# Patient Record
Sex: MALE | Race: WHITE | Employment: OTHER | URBAN - METROPOLITAN AREA
[De-identification: names, ages, dates, MRNs, and addresses within clinical notes are randomized per-mention and may not be internally consistent; named-entity substitution may affect disease eponyms.]

---

## 2019-02-09 ENCOUNTER — HOSPITAL ENCOUNTER (OUTPATIENT)
Dept: LAB | Age: 69
Discharge: HOME | End: 2019-02-09

## 2019-02-09 LAB
ERYTHROCYTE [DISTWIDTH] IN BLOOD BY AUTOMATED COUNT: 23.9 % (ref 11.9–14.6)
HCT VFR BLD AUTO: 37.4 % (ref 41.1–50.3)
HGB BLD-MCNC: 11.4 G/DL (ref 13.6–17.2)
MCH RBC QN AUTO: 26.3 PG (ref 26.1–32.9)
MCHC RBC AUTO-ENTMCNC: 30.5 G/DL (ref 31.4–35)
MCV RBC AUTO: 86.4 FL (ref 79.6–97.8)
NRBC # BLD: 0 K/UL (ref 0–0.2)
PLATELET # BLD AUTO: 240 K/UL (ref 150–450)
PMV BLD AUTO: 10.1 FL (ref 9.4–12.3)
RBC # BLD AUTO: 4.33 M/UL (ref 4.23–5.6)
WBC # BLD AUTO: 7.3 K/UL (ref 4.3–11.1)

## 2019-02-09 PROCEDURE — 85027 COMPLETE CBC AUTOMATED: CPT

## 2019-08-12 ENCOUNTER — APPOINTMENT (OUTPATIENT)
Dept: ULTRASOUND IMAGING | Age: 69
DRG: 727 | End: 2019-08-12
Attending: HOSPITALIST
Payer: MEDICARE

## 2019-08-12 ENCOUNTER — HOSPITAL ENCOUNTER (INPATIENT)
Age: 69
LOS: 4 days | Discharge: HOME | DRG: 727 | End: 2019-08-16
Attending: INTERNAL MEDICINE | Admitting: HOSPITALIST
Payer: MEDICARE

## 2019-08-12 ENCOUNTER — APPOINTMENT (OUTPATIENT)
Dept: GENERAL RADIOLOGY | Age: 69
DRG: 727 | End: 2019-08-12
Attending: HOSPITALIST
Payer: MEDICARE

## 2019-08-12 PROBLEM — N49.2 CELLULITIS OF SCROTUM: Status: ACTIVE | Noted: 2019-08-12

## 2019-08-12 PROBLEM — I50.23 ACUTE ON CHRONIC SYSTOLIC CHF (CONGESTIVE HEART FAILURE) (HCC): Status: ACTIVE | Noted: 2019-08-12

## 2019-08-12 LAB
ALBUMIN SERPL-MCNC: 2.7 G/DL (ref 3.2–4.6)
ALBUMIN/GLOB SERPL: 0.7 {RATIO} (ref 1.2–3.5)
ALP SERPL-CCNC: 80 U/L (ref 50–136)
ALT SERPL-CCNC: 14 U/L (ref 12–65)
ANION GAP SERPL CALC-SCNC: 6 MMOL/L (ref 7–16)
APPEARANCE UR: CLEAR
AST SERPL-CCNC: 22 U/L (ref 15–37)
BASOPHILS # BLD: 0 K/UL (ref 0–0.2)
BASOPHILS NFR BLD: 1 % (ref 0–2)
BILIRUB SERPL-MCNC: 1.9 MG/DL (ref 0.2–1.1)
BILIRUB UR QL: NEGATIVE
BNP SERPL-MCNC: 2942 PG/ML
BUN SERPL-MCNC: 15 MG/DL (ref 8–23)
CALCIUM SERPL-MCNC: 8 MG/DL (ref 8.3–10.4)
CHLORIDE SERPL-SCNC: 106 MMOL/L (ref 98–107)
CO2 SERPL-SCNC: 26 MMOL/L (ref 21–32)
COLOR UR: NORMAL
CREAT SERPL-MCNC: 1.46 MG/DL (ref 0.8–1.5)
DIFFERENTIAL METHOD BLD: ABNORMAL
EOSINOPHIL # BLD: 0.2 K/UL (ref 0–0.8)
EOSINOPHIL NFR BLD: 3 % (ref 0.5–7.8)
ERYTHROCYTE [DISTWIDTH] IN BLOOD BY AUTOMATED COUNT: 22.6 % (ref 11.9–14.6)
GLOBULIN SER CALC-MCNC: 4.1 G/DL (ref 2.3–3.5)
GLUCOSE BLD STRIP.AUTO-MCNC: 121 MG/DL (ref 65–100)
GLUCOSE BLD STRIP.AUTO-MCNC: 123 MG/DL (ref 65–100)
GLUCOSE SERPL-MCNC: 102 MG/DL (ref 65–100)
GLUCOSE UR STRIP.AUTO-MCNC: NEGATIVE MG/DL
HCT VFR BLD AUTO: 32 % (ref 41.1–50.3)
HGB BLD-MCNC: 9.7 G/DL (ref 13.6–17.2)
HGB UR QL STRIP: NEGATIVE
IMM GRANULOCYTES # BLD AUTO: 0.1 K/UL (ref 0–0.5)
IMM GRANULOCYTES NFR BLD AUTO: 1 % (ref 0–5)
KETONES UR QL STRIP.AUTO: NEGATIVE MG/DL
LACTATE SERPL-SCNC: 2.2 MMOL/L (ref 0.4–2)
LACTATE SERPL-SCNC: 2.3 MMOL/L (ref 0.4–2)
LEUKOCYTE ESTERASE UR QL STRIP.AUTO: NEGATIVE
LYMPHOCYTES # BLD: 0.7 K/UL (ref 0.5–4.6)
LYMPHOCYTES NFR BLD: 8 % (ref 13–44)
MCH RBC QN AUTO: 23.7 PG (ref 26.1–32.9)
MCHC RBC AUTO-ENTMCNC: 30.3 G/DL (ref 31.4–35)
MCV RBC AUTO: 78 FL (ref 79.6–97.8)
MONOCYTES # BLD: 0.9 K/UL (ref 0.1–1.3)
MONOCYTES NFR BLD: 10 % (ref 4–12)
NEUTS SEG # BLD: 6.7 K/UL (ref 1.7–8.2)
NEUTS SEG NFR BLD: 78 % (ref 43–78)
NITRITE UR QL STRIP.AUTO: NEGATIVE
NRBC # BLD: 0 K/UL (ref 0–0.2)
PH UR STRIP: 6.5 [PH] (ref 5–9)
PLATELET # BLD AUTO: 257 K/UL (ref 150–450)
PMV BLD AUTO: 9.5 FL (ref 9.4–12.3)
POTASSIUM SERPL-SCNC: 3.8 MMOL/L (ref 3.5–5.1)
PROCALCITONIN SERPL-MCNC: 0.2 NG/ML
PROT SERPL-MCNC: 6.8 G/DL (ref 6.3–8.2)
PROT UR STRIP-MCNC: NEGATIVE MG/DL
RBC # BLD AUTO: 4.1 M/UL (ref 4.23–5.6)
SODIUM SERPL-SCNC: 138 MMOL/L (ref 136–145)
SP GR UR REFRACTOMETRY: 1.01 (ref 1–1.02)
UROBILINOGEN UR QL STRIP.AUTO: 1 EU/DL (ref 0.2–1)
WBC # BLD AUTO: 8.6 K/UL (ref 4.3–11.1)

## 2019-08-12 PROCEDURE — 85025 COMPLETE CBC W/AUTO DIFF WBC: CPT

## 2019-08-12 PROCEDURE — 81003 URINALYSIS AUTO W/O SCOPE: CPT

## 2019-08-12 PROCEDURE — 74011250636 HC RX REV CODE- 250/636: Performed by: HOSPITALIST

## 2019-08-12 PROCEDURE — 74011000258 HC RX REV CODE- 258: Performed by: HOSPITALIST

## 2019-08-12 PROCEDURE — 71045 X-RAY EXAM CHEST 1 VIEW: CPT

## 2019-08-12 PROCEDURE — 84145 PROCALCITONIN (PCT): CPT

## 2019-08-12 PROCEDURE — 82962 GLUCOSE BLOOD TEST: CPT

## 2019-08-12 PROCEDURE — 83605 ASSAY OF LACTIC ACID: CPT

## 2019-08-12 PROCEDURE — 36415 COLL VENOUS BLD VENIPUNCTURE: CPT

## 2019-08-12 PROCEDURE — 74011250637 HC RX REV CODE- 250/637: Performed by: HOSPITALIST

## 2019-08-12 PROCEDURE — 65270000029 HC RM PRIVATE

## 2019-08-12 PROCEDURE — 83880 ASSAY OF NATRIURETIC PEPTIDE: CPT

## 2019-08-12 PROCEDURE — 80053 COMPREHEN METABOLIC PANEL: CPT

## 2019-08-12 PROCEDURE — 76870 US EXAM SCROTUM: CPT

## 2019-08-12 RX ORDER — METRONIDAZOLE 500 MG/100ML
500 INJECTION, SOLUTION INTRAVENOUS EVERY 12 HOURS
Status: DISCONTINUED | OUTPATIENT
Start: 2019-08-12 | End: 2019-08-15

## 2019-08-12 RX ORDER — VANCOMYCIN/0.9 % SOD CHLORIDE 1.5G/250ML
1500 PLASTIC BAG, INJECTION (ML) INTRAVENOUS
Status: DISCONTINUED | OUTPATIENT
Start: 2019-08-13 | End: 2019-08-15

## 2019-08-12 RX ORDER — NYSTATIN 100000 [USP'U]/G
POWDER TOPICAL 4 TIMES DAILY
COMMUNITY

## 2019-08-12 RX ORDER — SPIRONOLACTONE 25 MG/1
12.5 TABLET ORAL DAILY
Status: ON HOLD | COMMUNITY
End: 2021-01-22

## 2019-08-12 RX ORDER — POTASSIUM CHLORIDE 20 MEQ/1
20 TABLET, EXTENDED RELEASE ORAL
Status: COMPLETED | OUTPATIENT
Start: 2019-08-12 | End: 2019-08-12

## 2019-08-12 RX ORDER — MORPHINE SULFATE 2 MG/ML
2 INJECTION, SOLUTION INTRAMUSCULAR; INTRAVENOUS
Status: DISCONTINUED | OUTPATIENT
Start: 2019-08-12 | End: 2019-08-16 | Stop reason: HOSPADM

## 2019-08-12 RX ORDER — DIPHENHYDRAMINE HCL 25 MG
25 CAPSULE ORAL
Status: DISCONTINUED | OUTPATIENT
Start: 2019-08-12 | End: 2019-08-16 | Stop reason: HOSPADM

## 2019-08-12 RX ORDER — LORATADINE 10 MG/1
10 TABLET ORAL DAILY
Status: DISCONTINUED | OUTPATIENT
Start: 2019-08-13 | End: 2019-08-16 | Stop reason: HOSPADM

## 2019-08-12 RX ORDER — METFORMIN HYDROCHLORIDE 850 MG/1
850 TABLET ORAL 2 TIMES DAILY WITH MEALS
Status: ON HOLD | COMMUNITY
End: 2021-01-22

## 2019-08-12 RX ORDER — HYDROCODONE BITARTRATE AND ACETAMINOPHEN 5; 325 MG/1; MG/1
1 TABLET ORAL
Status: DISCONTINUED | OUTPATIENT
Start: 2019-08-12 | End: 2019-08-16 | Stop reason: HOSPADM

## 2019-08-12 RX ORDER — PRAVASTATIN SODIUM 20 MG/1
40 TABLET ORAL
Status: DISCONTINUED | OUTPATIENT
Start: 2019-08-12 | End: 2019-08-16 | Stop reason: HOSPADM

## 2019-08-12 RX ORDER — BUMETANIDE 2 MG/1
4 TABLET ORAL DAILY
Status: ON HOLD | COMMUNITY
End: 2021-01-22

## 2019-08-12 RX ORDER — ACETAMINOPHEN 325 MG/1
650 TABLET ORAL
Status: DISCONTINUED | OUTPATIENT
Start: 2019-08-12 | End: 2019-08-16 | Stop reason: HOSPADM

## 2019-08-12 RX ORDER — AMIODARONE HYDROCHLORIDE 200 MG/1
200 TABLET ORAL DAILY
COMMUNITY

## 2019-08-12 RX ORDER — INSULIN LISPRO 100 [IU]/ML
INJECTION, SOLUTION INTRAVENOUS; SUBCUTANEOUS
Status: DISCONTINUED | OUTPATIENT
Start: 2019-08-12 | End: 2019-08-16 | Stop reason: HOSPADM

## 2019-08-12 RX ORDER — FUROSEMIDE 10 MG/ML
60 INJECTION INTRAMUSCULAR; INTRAVENOUS EVERY 12 HOURS
Status: DISCONTINUED | OUTPATIENT
Start: 2019-08-12 | End: 2019-08-14

## 2019-08-12 RX ORDER — NALOXONE HYDROCHLORIDE 0.4 MG/ML
0.4 INJECTION, SOLUTION INTRAMUSCULAR; INTRAVENOUS; SUBCUTANEOUS AS NEEDED
Status: DISCONTINUED | OUTPATIENT
Start: 2019-08-12 | End: 2019-08-16 | Stop reason: HOSPADM

## 2019-08-12 RX ORDER — EPLERENONE 25 MG/1
TABLET, FILM COATED ORAL DAILY
COMMUNITY
End: 2021-02-03

## 2019-08-12 RX ORDER — SODIUM CHLORIDE 0.9 % (FLUSH) 0.9 %
5-40 SYRINGE (ML) INJECTION EVERY 8 HOURS
Status: DISCONTINUED | OUTPATIENT
Start: 2019-08-12 | End: 2019-08-16 | Stop reason: HOSPADM

## 2019-08-12 RX ORDER — EPLERENONE 25 MG/1
25 TABLET, FILM COATED ORAL DAILY
Status: DISCONTINUED | OUTPATIENT
Start: 2019-08-13 | End: 2019-08-12

## 2019-08-12 RX ORDER — BISACODYL 5 MG
5 TABLET, DELAYED RELEASE (ENTERIC COATED) ORAL DAILY PRN
Status: DISCONTINUED | OUTPATIENT
Start: 2019-08-12 | End: 2019-08-16 | Stop reason: HOSPADM

## 2019-08-12 RX ORDER — PRAVASTATIN SODIUM 40 MG/1
40 TABLET ORAL
COMMUNITY

## 2019-08-12 RX ORDER — METOPROLOL SUCCINATE 50 MG/1
100 TABLET, EXTENDED RELEASE ORAL DAILY
Status: DISCONTINUED | OUTPATIENT
Start: 2019-08-13 | End: 2019-08-16 | Stop reason: HOSPADM

## 2019-08-12 RX ORDER — CETIRIZINE HCL 10 MG
10 TABLET ORAL DAILY
COMMUNITY

## 2019-08-12 RX ORDER — METOPROLOL SUCCINATE 100 MG/1
100 TABLET, EXTENDED RELEASE ORAL DAILY
Status: ON HOLD | COMMUNITY
End: 2021-02-03 | Stop reason: SDUPTHER

## 2019-08-12 RX ORDER — SPIRONOLACTONE 25 MG/1
12.5 TABLET ORAL DAILY
Status: DISCONTINUED | OUTPATIENT
Start: 2019-08-13 | End: 2019-08-16 | Stop reason: HOSPADM

## 2019-08-12 RX ORDER — SODIUM CHLORIDE 0.9 % (FLUSH) 0.9 %
5-40 SYRINGE (ML) INJECTION AS NEEDED
Status: DISCONTINUED | OUTPATIENT
Start: 2019-08-12 | End: 2019-08-16 | Stop reason: HOSPADM

## 2019-08-12 RX ORDER — AMIODARONE HYDROCHLORIDE 200 MG/1
200 TABLET ORAL DAILY
Status: DISCONTINUED | OUTPATIENT
Start: 2019-08-13 | End: 2019-08-16 | Stop reason: HOSPADM

## 2019-08-12 RX ORDER — ONDANSETRON 2 MG/ML
4 INJECTION INTRAMUSCULAR; INTRAVENOUS
Status: DISCONTINUED | OUTPATIENT
Start: 2019-08-12 | End: 2019-08-16 | Stop reason: HOSPADM

## 2019-08-12 RX ADMIN — Medication 20 ML: at 16:00

## 2019-08-12 RX ADMIN — CEFEPIME 2 G: 2 INJECTION, POWDER, FOR SOLUTION INTRAVENOUS at 16:51

## 2019-08-12 RX ADMIN — METRONIDAZOLE 500 MG: 500 INJECTION, SOLUTION INTRAVENOUS at 16:51

## 2019-08-12 RX ADMIN — FUROSEMIDE 60 MG: 10 INJECTION, SOLUTION INTRAMUSCULAR; INTRAVENOUS at 21:35

## 2019-08-12 RX ADMIN — VANCOMYCIN HYDROCHLORIDE 2500 MG: 10 INJECTION, POWDER, LYOPHILIZED, FOR SOLUTION INTRAVENOUS at 21:36

## 2019-08-12 RX ADMIN — PRAVASTATIN SODIUM 40 MG: 20 TABLET ORAL at 21:35

## 2019-08-12 RX ADMIN — APIXABAN 5 MG: 2.5 TABLET, FILM COATED ORAL at 21:35

## 2019-08-12 RX ADMIN — Medication 10 ML: at 21:37

## 2019-08-12 RX ADMIN — SACUBITRIL AND VALSARTAN 1 TABLET: 24; 26 TABLET, FILM COATED ORAL at 16:52

## 2019-08-12 RX ADMIN — POTASSIUM CHLORIDE 20 MEQ: 20 TABLET, EXTENDED RELEASE ORAL at 20:00

## 2019-08-12 NOTE — PROGRESS NOTES
Pharmacokinetic Consult to Pharmacist    Shay Mei is a 77 y.o. male being treated for SSTI with vancomycin, cefepime and flagyl. Lab Results   Component Value Date/Time    BUN 15 08/12/2019 04:08 PM    Creatinine 1.46 08/12/2019 04:08 PM    WBC 8.6 08/12/2019 04:08 PM    Procalcitonin 0.2 08/12/2019 04:08 PM    Lactic acid 2.3 (HH) 08/12/2019 04:08 PM      CrCl cannot be calculated (Unknown ideal weight.). Day 1 of vancomycin. Goal trough is 10-20. Dosing started with 2.5g x 1 and then 1.5g q18h. Will continue to follow patient. Thank you,  Tiffanie Candelaria Pharm. D.   Clinical Pharmacist  [phone]

## 2019-08-12 NOTE — H&P
Hospitalist Note     Admit Date:  2019 12:19 PM   Name:  Tahir Sumner   Age:  77 y.o.  :  1950   MRN:  [id number]   PCP:  Shawn Landa MD  Treatment Team: Attending Provider: Rashmi Mares; Primary Nurse: [facility]    HPI/Subjective:   Chief complaint, pain swelling redness scrotum    This is a 71y male with PMHx significant for systolic CHF with EF 20- 93%, (ECHO 2019 at [facility]) Persistent A fib, HTN, DM2, presented as a direct admit from PCP office, because of concerns for scrotal cellulitis. Pt reports pain in his scrotum, swelling, redness, gradually worsening since the last 1 week. He reports 7-8/10, sharp pain in his scrotum, aggravated by movemnt and relieved by pain meds. No dysuria, no urgency, no frequency of urination. No fever. No chills. Pt also reports weight gain of 15 lbs in the last month, and 1 week ago, was switched from torsemide to Bumex 4mg. He has lower abdominal swelling, redness as well as in the thighs and groin region. Pt denies nausea, vomiting, reports left lower abdominal pain. He denies chest pain, no palpitations,. No cough, but reports to shortness of breath, worse with activity. Today, pt saw his PCP, in the office. Because of extensive scrotal and perineal cellulitis and need for IV antibiotics and CHF exacerbation, pt was admitted directly to the hospital for further evaluation and management. 10 systems reviewed and negative except as noted in HPI.   Past Medical History:   Diagnosis Date    Arthritis     OA-general/neck    Chronic pain     back    Diabetes ([facility] [address]     type 2 dx age 62-fastings avg 80; A1C-7.3 in -today  no S/S hypo    Hypertension     controlled with medication    Obesity     Other ill-defined conditions(799.89)     3 collaspsed discs- L4, L5,S1    S/P Left total knee replacement using cement 2011    S/P total knee replacement using cement 2011    right knee    Unspecified adverse effect of anesthesia     reports unable to do spinal with right knee due to collapsed discs/ no problem with GA      Past Surgical History:   Procedure Laterality Date    HX ORTHOPAEDIC  July 20,2011    replaced right TKA/ L TKA in Dec 2011      Allergies   Allergen Reactions    Adhesive Other (comments)     Redness from tegaderm     Other Medication Other (comments)     Reports swelling and shakiness with insect bites    Pcn [Penicillins] Hives    Pollen Extracts Other (comments)     Runny nose, itchy eyes      Social History     Tobacco Use    Smoking status: Never Smoker    Smokeless tobacco: Never Used   Substance Use Topics    Alcohol use: No      Family History   Problem Relation Age of Onset    Diabetes Mother     Heart Disease Father     Diabetes Sister     Diabetes Maternal Aunt         There is no immunization history on file for this patient. PTA Medications:  Prior to Admission Medications   Prescriptions Last Dose Informant Patient Reported? Taking? SITagliptin (JANUVIA) 100 mg tablet   Yes Yes   Sig: Take 100 mg by mouth daily. amiodarone (CORDARONE) 200 mg tablet   Yes Yes   Sig: Take 200 mg by mouth daily. apixaban (ELIQUIS) 5 mg tablet   Yes Yes   Sig: Take 5 mg by mouth two (2) times a day. bumetanide (BUMEX) 2 mg tablet   Yes Yes   Sig: Take 4 mg by mouth daily. cetirizine (ZYRTEC) 10 mg tablet   Yes Yes   Sig: Take 10 mg by mouth daily. eplerenone (INSPRA) 25 mg tablet   Yes Yes   Sig: Take  by mouth daily. metFORMIN (GLUCOPHAGE) 850 mg tablet   Yes Yes   Sig: Take 850 mg by mouth two (2) times daily (with meals). metoprolol succinate (TOPROL-XL) 100 mg tablet   Yes Yes   Sig: Take 100 mg by mouth daily. nystatin (NYSTOP) powder   Yes Yes   Sig: Apply  to affected area four (4) times daily. omega-3 fatty acids-vitamin e (FISH OIL) 1,000 mg Cap   Yes Yes   Sig: Take 1 Cap by mouth daily (after breakfast).  Las dose 2/3/12    pravastatin (PRAVACHOL) 40 mg tablet   Yes Yes   Sig: Take 40 mg by mouth nightly. sacubitril-valsartan (ENTRESTO) 24 mg/26 mg tablet   Yes Yes   Sig: Take 1 Tab by mouth two (2) times a day. spironolactone (ALDACTONE) 25 mg tablet   Yes Yes   Sig: Take 12.5 mg by mouth daily. Facility-Administered Medications: None       Objective:     Patient Vitals for the past 24 hrs:   Temp Pulse Resp BP SpO2   08/12/19 1536 97.6 °F (36.4 °C) 79 20 112/56 98 %   08/12/19 1247 98 °F (36.7 °C) 80 18 127/88 99 %     Oxygen Therapy  O2 Sat (%): 98 % (08/12/19 1536)      Intake/Output Summary (Last 24 hours) at 8/12/2019 1548  Last data filed at 8/12/2019 1518  Gross per 24 hour   Intake 240 ml   Output    Net 240 ml       *Note that automatically entered I/Os may not be accurate; dependent on patient compliance with collection and accurate  by assistants. Physical Exam:  General:    Obese,  Alert, awake oriented x 3,     Eyes:   Normal sclerae. Extraocular movements intact. HENT:  Normocephalic, atraumatic. Moist mucous membranes  CV:   RRR. No m/r/g. Lungs:  CTAB. No wheezing, rhonchi, or rales. Abdomen: Soft, obese, tenderness, noticed in the left lower quadrant, no rebound, no rigidity, no organomegaly, lower abdominal redness, sub cutaneous edema, extending to perineum, scrotum, bilateral thighs,   Genitourinary: Edematous scrotum with tenderness, erythema, no ulceration,    Extremities: Warm and dry. No cyanosis, leg edema pitting 2+, erythema in bilateral upper thighs. Neurologic: CN II-XII grossly intact. Sensation intact. No focal neurological deficits,   Skin:    Erythema, involving lower abdomen with subcutaneous edema, and redness extending to perineum, b/l groins, upper thighs b/l, excoriations upper and lower extremities,    Psych:  Normal mood and affect. I reviewed the labs, imaging, EKGs, telemetry, and other studies done this admission.   Data Review:   No results found for this or any previous visit (from the past 24 hour(s)). All Micro Results     None          Current Facility-Administered Medications   Medication Dose Route Frequency    metroNIDAZOLE (FLAGYL) IVPB premix 500 mg  500 mg IntraVENous Q12H    furosemide (LASIX) injection 60 mg  60 mg IntraVENous Q12H    sodium chloride (NS) flush 5-40 mL  5-40 mL IntraVENous Q8H    sodium chloride (NS) flush 5-40 mL  5-40 mL IntraVENous PRN    acetaminophen (TYLENOL) tablet 650 mg  650 mg Oral Q4H PRN    HYDROcodone-acetaminophen (NORCO) 5-325 mg per tablet 1 Tab  1 Tab Oral Q4H PRN    morphine injection 2 mg  2 mg IntraVENous Q4H PRN    naloxone (NARCAN) injection 0.4 mg  0.4 mg IntraVENous PRN    diphenhydrAMINE (BENADRYL) capsule 25 mg  25 mg Oral Q4H PRN    ondansetron (ZOFRAN) injection 4 mg  4 mg IntraVENous Q4H PRN    bisacodyl (DULCOLAX) tablet 5 mg  5 mg Oral DAILY PRN    cefepime (MAXIPIME) 2 g in 0.9% sodium chloride (MBP/ADV) 100 mL  2 g IntraVENous Q12H    insulin lispro (HUMALOG) injection   SubCUTAneous AC&HS    [START ON 8/13/2019] amiodarone (CORDARONE) tablet 200 mg  200 mg Oral DAILY    apixaban (ELIQUIS) tablet 5 mg  5 mg Oral BID    [START ON 8/13/2019] loratadine (CLARITIN) tablet 10 mg  10 mg Oral DAILY    [START ON 8/13/2019] metoprolol succinate (TOPROL-XL) XL tablet 100 mg  100 mg Oral DAILY    pravastatin (PRAVACHOL) tablet 40 mg  40 mg Oral QHS    sacubitril-valsartan (ENTRESTO) 24-26 mg tablet 1 Tab  1 Tab Oral BID    [START ON 8/13/2019] spironolactone (ALDACTONE) tablet 12.5 mg  12.5 mg Oral DAILY       Other Studies:  No results found.     Assessment and Plan:     Hospital Problems as of 8/12/2019 Date Reviewed: 5/12/2015          Codes Class Noted - Resolved POA    Acute on chronic systolic CHF (congestive heart failure) (HCC) ICD-10-CM: I50.23  ICD-9-CM: 428.23, 428.0  8/12/2019 - Present Unknown        * (Principal) Cellulitis of scrotum ICD-10-CM: N49.2  ICD-9-CM: 608.4  8/12/2019 - Present Unknown Diabetes ([facility] [address] (Chronic) ICD-10-CM: E11.9  ICD-9-CM: 250.00  7/22/2011 - Present Yes        HTN (hypertension) ICD-10-CM: I10  ICD-9-CM: 401.9  7/22/2011 - Present Yes              Plan: This is a 77 y Male with:    Extensive Scrotal and perineal cellulitis: POA  Cefepime,  Vancomycin, Flagyl. Urology c/s. Appreciate recs. No signs of Peter's gangrene clinically. Will obtain U/S scrotum,  CT abdomen and pelvis will also be obtained,     Acute on chronic Systolic CHF exacerbation due to non ischemic cardiomyopathy:  Last ECHO 4/2019 with EF 20-25%  IV Lasix 60 mg bid. Fluid restrictions, daily weights,  CBC, CMP, BNP, CXR  Beta blocker Entresto, Spironolactone. HTN/ CAD non obstructive:  Home meds    DM2:  Accuchecks, ACHS, Sliding scale Insulin  Hold Metformin,    Persistent A fib:  Eliquis     Discharge planning:    DVT ppx: Already on Eliquis  Code status:  Full    DPOA: Pt's wife Azul Apple [phone]  Estimated LOS:  Greater than 2 midnights  Risk:  High given presentation with Scrotal cellulitis, systolic CHF exacerbation and other comorbidities.      Signed:  Tammi Vargas MD

## 2019-08-12 NOTE — PROGRESS NOTES
Pt resting in bed. RR even/unlabored. NAD noted. Call light within reach. Safety measures in place. Encouraged to call PRN assist. Assessment completed.

## 2019-08-12 NOTE — PROGRESS NOTES
Pt is a direct admit he is alert and oriented rr are even and unlabored he is on room air tolerates well. Lung sounds are diminished no distress noted. abd is soft bowel sounds are active. Pt has swollen penis  with redness and swelling noted to inner groin. Rash noted to upper arms chest abd and legs. Pt states it all burns and itches. No distress noted. wil lower extremities both swollen with +1 pitting and weeping noted. Safety measures in place will continue to monitor.

## 2019-08-13 ENCOUNTER — APPOINTMENT (OUTPATIENT)
Dept: ULTRASOUND IMAGING | Age: 69
DRG: 727 | End: 2019-08-13
Attending: UROLOGY
Payer: MEDICARE

## 2019-08-13 ENCOUNTER — APPOINTMENT (OUTPATIENT)
Dept: CT IMAGING | Age: 69
DRG: 727 | End: 2019-08-13
Attending: HOSPITALIST
Payer: MEDICARE

## 2019-08-13 PROBLEM — I48.0 PAROXYSMAL ATRIAL FIBRILLATION (HCC): Status: ACTIVE | Noted: 2019-08-13

## 2019-08-13 PROBLEM — I42.9 CARDIOMYOPATHY (HCC): Status: ACTIVE | Noted: 2019-08-13

## 2019-08-13 PROBLEM — R60.1 ANASARCA: Status: ACTIVE | Noted: 2019-08-13

## 2019-08-13 PROBLEM — I27.81 COR PULMONALE (CHRONIC) (HCC): Status: ACTIVE | Noted: 2019-08-13

## 2019-08-13 LAB
ANION GAP SERPL CALC-SCNC: 7 MMOL/L (ref 7–16)
BUN SERPL-MCNC: 17 MG/DL (ref 8–23)
CALCIUM SERPL-MCNC: 8 MG/DL (ref 8.3–10.4)
CHLORIDE SERPL-SCNC: 110 MMOL/L (ref 98–107)
CO2 SERPL-SCNC: 26 MMOL/L (ref 21–32)
CREAT SERPL-MCNC: 1.31 MG/DL (ref 0.8–1.5)
GLUCOSE BLD STRIP.AUTO-MCNC: 115 MG/DL (ref 65–100)
GLUCOSE BLD STRIP.AUTO-MCNC: 124 MG/DL (ref 65–100)
GLUCOSE BLD STRIP.AUTO-MCNC: 132 MG/DL (ref 65–100)
GLUCOSE BLD STRIP.AUTO-MCNC: 140 MG/DL (ref 65–100)
GLUCOSE SERPL-MCNC: 96 MG/DL (ref 65–100)
LACTATE SERPL-SCNC: 1.5 MMOL/L (ref 0.4–2)
MAGNESIUM SERPL-MCNC: 2 MG/DL (ref 1.8–2.4)
POTASSIUM SERPL-SCNC: 3.8 MMOL/L (ref 3.5–5.1)
SODIUM SERPL-SCNC: 143 MMOL/L (ref 136–145)

## 2019-08-13 PROCEDURE — 74011000258 HC RX REV CODE- 258: Performed by: HOSPITALIST

## 2019-08-13 PROCEDURE — 80048 BASIC METABOLIC PNL TOTAL CA: CPT

## 2019-08-13 PROCEDURE — 65270000029 HC RM PRIVATE

## 2019-08-13 PROCEDURE — 74011250636 HC RX REV CODE- 250/636: Performed by: INTERNAL MEDICINE

## 2019-08-13 PROCEDURE — 74011250636 HC RX REV CODE- 250/636: Performed by: HOSPITALIST

## 2019-08-13 PROCEDURE — 74176 CT ABD & PELVIS W/O CONTRAST: CPT

## 2019-08-13 PROCEDURE — 36415 COLL VENOUS BLD VENIPUNCTURE: CPT

## 2019-08-13 PROCEDURE — 97166 OT EVAL MOD COMPLEX 45 MIN: CPT

## 2019-08-13 PROCEDURE — 83605 ASSAY OF LACTIC ACID: CPT

## 2019-08-13 PROCEDURE — 83735 ASSAY OF MAGNESIUM: CPT

## 2019-08-13 PROCEDURE — 97161 PT EVAL LOW COMPLEX 20 MIN: CPT

## 2019-08-13 PROCEDURE — 97530 THERAPEUTIC ACTIVITIES: CPT

## 2019-08-13 PROCEDURE — 82962 GLUCOSE BLOOD TEST: CPT

## 2019-08-13 PROCEDURE — 74011250637 HC RX REV CODE- 250/637: Performed by: HOSPITALIST

## 2019-08-13 PROCEDURE — 97535 SELF CARE MNGMENT TRAINING: CPT

## 2019-08-13 RX ORDER — SODIUM CHLORIDE 0.9 % (FLUSH) 0.9 %
10 SYRINGE (ML) INJECTION
Status: ACTIVE | OUTPATIENT
Start: 2019-08-13 | End: 2019-08-13

## 2019-08-13 RX ADMIN — AMIODARONE HYDROCHLORIDE 200 MG: 200 TABLET ORAL at 09:18

## 2019-08-13 RX ADMIN — Medication 10 ML: at 05:53

## 2019-08-13 RX ADMIN — SACUBITRIL AND VALSARTAN 1 TABLET: 24; 26 TABLET, FILM COATED ORAL at 09:18

## 2019-08-13 RX ADMIN — APIXABAN 5 MG: 2.5 TABLET, FILM COATED ORAL at 09:19

## 2019-08-13 RX ADMIN — METRONIDAZOLE 500 MG: 500 INJECTION, SOLUTION INTRAVENOUS at 04:02

## 2019-08-13 RX ADMIN — METRONIDAZOLE 500 MG: 500 INJECTION, SOLUTION INTRAVENOUS at 16:50

## 2019-08-13 RX ADMIN — FUROSEMIDE 60 MG: 10 INJECTION, SOLUTION INTRAMUSCULAR; INTRAVENOUS at 09:18

## 2019-08-13 RX ADMIN — CEFEPIME 2 G: 2 INJECTION, POWDER, FOR SOLUTION INTRAVENOUS at 03:28

## 2019-08-13 RX ADMIN — Medication 20 ML: at 14:00

## 2019-08-13 RX ADMIN — LORATADINE 10 MG: 10 TABLET ORAL at 09:18

## 2019-08-13 RX ADMIN — APIXABAN 5 MG: 2.5 TABLET, FILM COATED ORAL at 16:52

## 2019-08-13 RX ADMIN — PRAVASTATIN SODIUM 40 MG: 20 TABLET ORAL at 21:30

## 2019-08-13 RX ADMIN — CEFEPIME 2 G: 2 INJECTION, POWDER, FOR SOLUTION INTRAVENOUS at 16:50

## 2019-08-13 RX ADMIN — METOPROLOL SUCCINATE 100 MG: 50 TABLET, EXTENDED RELEASE ORAL at 09:19

## 2019-08-13 RX ADMIN — VANCOMYCIN HYDROCHLORIDE 1500 MG: 10 INJECTION, POWDER, LYOPHILIZED, FOR SOLUTION INTRAVENOUS at 13:33

## 2019-08-13 RX ADMIN — SACUBITRIL AND VALSARTAN 1 TABLET: 24; 26 TABLET, FILM COATED ORAL at 16:51

## 2019-08-13 RX ADMIN — Medication 10 ML: at 21:30

## 2019-08-13 RX ADMIN — SPIRONOLACTONE 12.5 MG: 25 TABLET ORAL at 09:18

## 2019-08-13 NOTE — PROGRESS NOTES
Pt in room alert and oriented rr are even and unlabored lung sounds are diminished. On room air. abd is soft bowel sounds are active. Swelling to groin +2. Skin red inflamed and rash noted to abd, arms and legs. Denies pain at current time safety measures in place will continue to monitor.

## 2019-08-13 NOTE — PROGRESS NOTES
Problem: Mobility Impaired (Adult and Pediatric)  Goal: *Acute Goals and Plan of Care (Insert Text)  Description  Discharge Goals:  (1.)Mr. Bob will move from supine to sit and sit to supine  with INDEPENDENT within 7 treatment day(s). (2.)Mr. Bob will transfer from bed to chair and chair to bed with MODIFIED INDEPENDENCE using the least restrictive device within 7 treatment day(s). (3.)Mr. Bob will ambulate with SUPERVISION for 500+ feet with the least restrictive device within 7 treatment day(s). ________________________________________________________________________________________________     Outcome: Progressing Towards Goal       PHYSICAL THERAPY: Initial Assessment and AM 8/13/2019  INPATIENT: PT Visit Days : 1  [place] / [place] / Product Type: Medicare /       NAME/AGE/GENDER: Khris Jerez is a 77 y.o. male   PRIMARY DIAGNOSIS: Acute on chronic systolic CHF (congestive heart failure) ([facility]) [I50.23] Cellulitis of scrotum   Cellulitis of scrotum          ICD-10: Treatment Diagnosis:    Generalized Muscle Weakness (M62.81)  Difficulty in walking, Not elsewhere classified (R26.2)  History of falling (Z91.81)   Precaution/Allergies:  Adhesive; Other medication; Pcn [penicillins]; and Pollen extracts      ASSESSMENT:     Mr. Naresh Weston is standing at sink finishing working with OT and agreeable to PT evaluation and treatment this morning. Pt is A&O X 4 with reports of 4/10 pain prior to mobility. Pt lives with his wife in 1 story home with 0 steps to enter. Pt is ambulatory with use of SPC but states over past week he has required use of RW due to increasing unsteadiness. Pt is independent with ADLs and reports history of 1 fall. Pt demonstrates good static standing balance at sink. Pt ambulated in room with RW and SBA. Pt furthered gait distance ambulating 100 ft in hallway with RW and CGA-SBA.  Pt ambulates with widened PHILL, shuffling gait pattern and increased trunk sway. Pt returned to room and sitting up in bedside chair with cues provided for safety of transfer. Pt left sitting up with all needs met and within reach. Stephan Goss will benefit from skilled PT (medically necessary) to address decreased strength, decreased balance, decreased functional tolerance, decreased cardiopulmonary endurance affecting participation in basic ADLs and functional tasks. This section established at most recent assessment   PROBLEM LIST (Impairments causing functional limitations):  Decreased Strength  Decreased ADL/Functional Activities  Decreased Transfer Abilities  Decreased Ambulation Ability/Technique  Decreased Balance  Increased Pain  Decreased Activity Tolerance  Decreased Pacing Skills  Increased Fatigue  Decreased [place] with Home Exercise Program   INTERVENTIONS PLANNED: (Benefits and precautions of physical therapy have been discussed with the patient.)  Balance Exercise  Bed Mobility  Family Education  Gait Training  Home Exercise Program (HEP)  Neuromuscular Re-education/Strengthening  Therapeutic Activites  Therapeutic Exercise/Strengthening  Transfer Training     TREATMENT PLAN: Frequency/Duration: 3 times a week for duration of hospital stay  Rehabilitation Potential For Stated Goals: Good     REHAB RECOMMENDATIONS (at time of discharge pending progress):    Placement: It is my opinion, based on this patient's performance to date, that Mr. Elisabet Russo may benefit from [address] after discharge due to the functional deficits listed above that are likely to improve with skilled rehabilitation because he/she has multiple medical issues that affect his/her functional mobility in the community.   Equipment:   None at this time              HISTORY:   History of Present Injury/Illness (Reason for Referral):  See H&P below  This is a 71y male with PMHx significant for systolic CHF with EF 20- 90%, (ECHO 04/2019 at [facility]) Persistent A fib, HTN, DM2, presented as a direct admit from PCP office, because of concerns for scrotal cellulitis. Pt reports pain in his scrotum, swelling, redness, gradually worsening since the last 1 week. He reports 7-8/10, sharp pain in his scrotum, aggravated by movemnt and relieved by pain meds. No dysuria, no urgency, no frequency of urination. No fever. No chills. Pt also reports weight gain of 15 lbs in the last month, and 1 week ago, was switched from torsemide to Bumex 4mg. He has lower abdominal swelling, redness as well as in the thighs and groin region. Pt denies nausea, vomiting, reports left lower abdominal pain. He denies chest pain, no palpitations,. No cough, but reports to shortness of breath, worse with activity. Today, pt saw his PCP, in the office. Because of extensive scrotal and perineal cellulitis and need for IV antibiotics and CHF exacerbation, pt was admitted directly to the hospital for further evaluation and management. Past Medical History/Comorbidities:   Mr. Juan Antonio Dejesus  has a past medical history of Arthritis, Chronic pain, Diabetes ([facility] [address] Hypertension, Obesity, Other ill-defined conditions(799.89), S/P Left total knee replacement using cement (12/5/2011), S/P total knee replacement using cement (7/20/2011), and Unspecified adverse effect of anesthesia. Mr. Juan Antonio Dejesus  has a past surgical history that includes hx orthopaedic (July 20,2011).   Social History/Living Environment:   Home Environment: Private residence  # Steps to Enter: 0  One/Two Story Residence: One story  Living Alone: No  Support Systems: Spouse/Significant Other/Partner  Patient Expects to be Discharged to[de-identified] Private residence  Current DME Used/Available at Home: Lugene Kays, straight, Walker, rolling, Shower chair, Grab bars, CPAP  Tub or Shower Type: Shower  Prior Level of Function/Work/Activity:  Amb with use of SPC, indep with ADLs, 1 fall   Number of Personal Factors/Comorbidities that affect the Plan of Care: 1-2: MODERATE COMPLEXITY EXAMINATION:   Most Recent Physical Functioning:   Gross Assessment:  AROM: Generally decreased, functional  Strength: Generally decreased, functional               Posture:     Balance:  Standing - Static: Constant support;Good  Standing - Dynamic : Fair;Constant support Bed Mobility:     Wheelchair Mobility:     Transfers:  Sit to Stand: Stand-by assistance;Contact guard assistance  Stand to Sit: Stand-by assistance  Gait:     Base of Support: Widened  Speed/Rhonda: Slow;Shuffled  Step Length: Left shortened;Right shortened  Gait Abnormalities: Decreased step clearance;Trunk sway increased; Shuffling gait  Distance (ft): 100 Feet (ft)  Assistive Device: Walker, rolling  Ambulation - Level of Assistance: Contact guard assistance;Stand-by assistance  Interventions: Safety awareness training; Tactile cues; Verbal cues      Body Structures Involved:  Nerves  Heart  Lungs  Bones  Muscles Body Functions Affected:  Sensory/Pain  Cardio  Respiratory  Neuromusculoskeletal  Movement Related Activities and Participation Affected:  General Tasks and Demands  Mobility  Self Care  Domestic Life  Interpersonal Interactions and Relationships  Community, Social and Civic Life   Number of elements that affect the Plan of Care: 4+: HIGH COMPLEXITY   CLINICAL PRESENTATION:   Presentation: Evolving clinical presentation with changing clinical characteristics: MODERATE COMPLEXITY   CLINICAL DECISION MAKIN [facility] Inpatient Short Form  How much difficulty does the patient currently have. .. Unable A Lot A Little None   1. Turning over in bed (including adjusting bedclothes, sheets and blankets)? ? 1   ? 2   ? 3   ? 4   2. Sitting down on and standing up from a chair with arms ( e.g., wheelchair, bedside commode, etc.)   ? 1   ? 2   ? 3   ? 4   3.   Moving from lying on back to sitting on the side of the bed?   ? 1   ? 2   ? 3   ? 4   How much help from another person does the patient currently need. .. Total A Lot A Little None   4. Moving to and from a bed to a chair (including a wheelchair)? ? 1   ? 2   ? 3   ? 4   5. Need to walk in hospital room? ? 1   ? 2   ? 3   ? 4   6. Climbing 3-5 steps with a railing? ? 1   ? 2   ? 3   ? 4   © 2007, Trustees of [address] [place], [place] [id number], under license to [employer]. All rights reserved      Score:  Initial: 18 Most Recent: X (Date: -- )    Interpretation of Tool:  Represents activities that are increasingly more difficult (i.e. Bed mobility, Transfers, Gait). Medical Necessity:     Patient is expected to demonstrate progress in strength, balance, coordination and functional technique   to decrease assistance required with gait, transfers and functional mobility. .  Reason for Services/Other Comments:  Patient continues to require skilled intervention due to decreased strength, decreased balance, decreased functional tolerance, decreased cardiopulmonary endurance affecting participation in basic ADLs and functional tasks. .   Use of outcome tool(s) and clinical judgement create a POC that gives a: Clear prediction of patient's progress: LOW COMPLEXITY            TREATMENT:   (In addition to Assessment/Re-Assessment sessions the following treatments were rendered)   Pre-treatment Symptoms/Complaints:  scrotal swelling  Pain: Initial:     4/10 Post Session:  Increased with mobility 5-6/10     Therapeutic Activity: (    8 minutes): Therapeutic activities including Chair transfers, Ambulation on level ground and cues for safety of transfers  to improve mobility, strength, balance and coordination. Required minimal Safety awareness training; Tactile cues; Verbal cues to promote static and dynamic balance in standing and promote coordination of bilateral, upper extremity(s), lower extremity(s).        Braces/Orthotics/Lines/Etc:   O2 Device: Room air  Treatment/Session Assessment:    Response to Treatment:  Amb 100 ft with RW and CGA-SBA  Interdisciplinary Collaboration:   Physical Therapist  Occupational Therapist  Registered Nurse  Certified Nursing Assistant/Patient Care Technician  After treatment position/precautions:   Up in chair  Bed/Chair-wheels locked  Bed in low position  Call light within reach   Compliance with Program/Exercises: Will assess as treatment progresses  Recommendations/Intent for next treatment session: \"Next visit will focus on advancements to more challenging activities and reduction in assistance provided\".   Total Treatment Duration:  PT Patient Time In/Time Out  Time In: 1011  Time Out: [address]

## 2019-08-13 NOTE — PROGRESS NOTES
Received a call from Xavier Sky in the lab notifying of a critical lactic acid result of 2.2. Results read back for verification. Notified primary RN, Tracy Olsen at [phone].

## 2019-08-13 NOTE — ROUTINE PROCESS
Spoke with patient in regardst o POC at this point and disease state. Patient is with [facility] cardiology and I will assist in follow up appointment closer to d/c. Patient reports 40lb weight gain since June and 15lbs in a month, he has Hx NICM EF 20-25% and echo completed in April. Patient currently on BID iv lasix with >4L negative overnight. Patient continues on entresto, BB, Aldactone. BNP 2942, Cr 1.46. AM labs pending. Patient with cellulitis to scrotum and on IV anbx, +2 edema to BLE. Patient endorsed limited shortness of breath prior to admission but endorsed the swelling to scrotum and pain was what made him seek treatment. Patient admit from PCP Continue to follow. CHF teaching started post introduction to pt/family; aware of diagnosis. Planner/scale @ BS and will follow. Smoking/ ETOH/Illicit drug use cessation and maintain a healthy weight covered. Pt/family aware that I can not prescribe nor adjust  medications: 15mins Palliative Care score: 16 Refused ACP on admission; on file Start 2L/D Fluid restriction/ cardiac diet CHF teaching continues to pt/family. Emphasis on taking prescription meds as ordered, to keep F/U appts and to call MD STAT if any of the following occur: ? If you gain 2 lbs in one day or 5 lbs in a week, and short of breath. ? If you can not lay flat without developing short of breath or rapid breathing at night; or if it wakes you up. Develop a cough or wheezing. ? If you notice swollen hands/feet/ankles or stomach with a bloated/ full feeling. ? If you become confused or mentally fuzzy or dizzy. ? If you notice a rapid or change in your heart rate. ? If you become more exhausted all the time and unable to do the same level of activity without stopping to catch your breath. Drink no more than 8 cups a day in 8 oz. cups. Your Heart can not handle any more. Stay away from salt (limit anything with salt or sodium in it). Limit to 250mg per serving. Pt/family verbalizes understanding, will follow to reinforce teaching skills: 35 mins

## 2019-08-13 NOTE — CONSULTS
[facility] Cardiology Consult                Date of  Admission: 8/12/2019 12:19 PM     Primary Care Physician: Dr. Akosua Saavedra  Primary Cardiologist:CC/[facility]  Referring Physician: Hospitalist   Consulting Physician: Dr. Flakita Win     CC/Reason for consult:CHF      Mukesh Nair is a 77 y.o. male with prior h/o HFrEF 2/2 NICM (etiology unknown per [facility] records with [facility] 2018 nonobstructive CAD), CHB s/p SJM BIV ICD, persistent A. Fib, NSVT, HTN, ZEINA home CPAP, and HLP. Recent 5/19- RHC showed RA 17, RV ?, PA 45/18(30), PAsat 62.1%, PW 13 (V-wave 20), CO/CI  5.2/2. 5(T) and 5.7/2.8(F), PVR 3.3 Conclusions: Moderately elevated right-sided and mildly elevated  left-sided filling pressures, mild pulmonary hypertension, and normal CO/CI by Bhupendra and Thermal. Successful CardioMEMS implantation in the left pulmonary artery. Goal PAD will be 17, for now. Last echo 4/19 showed EF 20-25%, RV normal, mod MR, mod Pul HTN. Patient was a direct admit from PCP's office with concerns for scrotal cellulitis. Pt reports pain in his scrotum, swelling, redness, gradually worsening since the last 1 week. He reports 7-8/10, sharp pain in his scrotum, aggravated by movemnt and relieved by pain meds. No dysuria, no urgency, no frequency of urination. No fever. No chills. Pt also reports weight gain of 15 lbs in the last month, and 1 week ago, was switched from torsemide to Bumex 4mg. He has lower abdominal swelling, redness as well as in the thighs and groin region. Hospitalist admitted for cellulitis and placed on IV abx. Urology saw patient and felt likely some of edema related to CHF. Labs showed BNP 2942, LA 2.3, Cr 1.46 (last Cr at [facility] 7/2/19 was 1.25) . Cardiology was consulted for CHF. He diuresed 4.3 liters overnight. No labs this am. He reports 40 pound weight gain over last several months, along with SOB and edema. No chest pain.        Diagnosis    Osteoarthritis of right knee    S/P total knee replacement using cement    UTI (urinary tract infection)    UTI (urinary tract infection)    Diabetes ([facility]    HTN (hypertension)    Osteoarthritis of left knee    S/P Left total knee replacement using cement    HTN (hypertension)    Type II or unspecified type diabetes mellitus without mention of complication, not stated as uncontrolled    Cutaneous skin tags    Seborrheic keratosis, inflamed    Acute on chronic systolic CHF (congestive heart failure) (HCC)    Cellulitis of scrotum       Past Medical History:   Diagnosis Date    Arthritis     OA-general/neck    Chronic pain     back    Diabetes ([facility]     type 2 dx age 62-fastings avg 80; A1C-7.3 in July '11-today  no S/S hypo    Hypertension     controlled with medication    Obesity     Other ill-defined conditions(799.89)     3 collaspsed discs- L4, L5,S1    S/P Left total knee replacement using cement 12/5/2011    S/P total knee replacement using cement 7/20/2011    right knee    Unspecified adverse effect of anesthesia     reports unable to do spinal with right knee due to collapsed discs/ no problem with GA      Past Surgical History:   Procedure Laterality Date    HX ORTHOPAEDIC  July 20,2011    replaced right TKA/ L TKA in Dec 2011     Allergies   Allergen Reactions    Adhesive Other (comments)     Redness from tegaderm     Other Medication Other (comments)     Reports swelling and shakiness with insect bites    Pcn [Penicillins] Hives    Pollen Extracts Other (comments)     Runny nose, itchy eyes      Family History   Problem Relation Age of Onset    Diabetes Mother     Heart Disease Father     Diabetes Sister     Diabetes Maternal Aunt         Current Facility-Administered Medications   Medication Dose Route Frequency    sodium chloride 0.9 % bolus infusion 100 mL  100 mL IntraVENous RAD ONCE    saline peripheral flush soln 10 mL  10 mL InterCATHeter RAD ONCE    iopamidol (ISOVUE-370) 76 % injection 100 mL  100 mL IntraVENous RAD ONCE    metroNIDAZOLE (FLAGYL) IVPB premix 500 mg  500 mg IntraVENous Q12H    furosemide (LASIX) injection 60 mg  60 mg IntraVENous Q12H    sodium chloride (NS) flush 5-40 mL  5-40 mL IntraVENous Q8H    sodium chloride (NS) flush 5-40 mL  5-40 mL IntraVENous PRN    acetaminophen (TYLENOL) tablet 650 mg  650 mg Oral Q4H PRN    HYDROcodone-acetaminophen (NORCO) 5-325 mg per tablet 1 Tab  1 Tab Oral Q4H PRN    morphine injection 2 mg  2 mg IntraVENous Q4H PRN    naloxone (NARCAN) injection 0.4 mg  0.4 mg IntraVENous PRN    diphenhydrAMINE (BENADRYL) capsule 25 mg  25 mg Oral Q4H PRN    ondansetron (ZOFRAN) injection 4 mg  4 mg IntraVENous Q4H PRN    bisacodyl (DULCOLAX) tablet 5 mg  5 mg Oral DAILY PRN    cefepime (MAXIPIME) 2 g in 0.9% sodium chloride (MBP/ADV) 100 mL  2 g IntraVENous Q12H    insulin lispro (HUMALOG) injection   SubCUTAneous AC&HS    amiodarone (CORDARONE) tablet 200 mg  200 mg Oral DAILY    apixaban (ELIQUIS) tablet 5 mg  5 mg Oral BID    loratadine (CLARITIN) tablet 10 mg  10 mg Oral DAILY    metoprolol succinate (TOPROL-XL) XL tablet 100 mg  100 mg Oral DAILY    pravastatin (PRAVACHOL) tablet 40 mg  40 mg Oral QHS    sacubitril-valsartan (ENTRESTO) 24-26 mg tablet 1 Tab  1 Tab Oral BID    spironolactone (ALDACTONE) tablet 12.5 mg  12.5 mg Oral DAILY    vancomycin (VANCOCIN) 1500 mg in  ml infusion  1,500 mg IntraVENous Q18H       Review of Systems   Constitution: Negative for diaphoresis, malaise/fatigue and weight gain. HENT: Negative for congestion. Cardiovascular: Positive for dyspnea on exertion and leg swelling. Negative for chest pain, claudication, cyanosis, irregular heartbeat, near-syncope, orthopnea, palpitations, paroxysmal nocturnal dyspnea and syncope. Respiratory: Negative for cough, shortness of breath and wheezing. Endocrine: Negative for cold intolerance and heat intolerance. Hematologic/Lymphatic: Does not bruise/bleed easily. Skin: Negative for nail changes. Neurological: Negative for dizziness, headaches and weakness. Physical Exam  Vitals:    08/13/19 0004 08/13/19 0416 08/13/19 0417 08/13/19 0734   BP: 110/68 111/67  109/63   Pulse: 77 71  82   Resp: 19 19 20   Temp: 98.6 °F (37 °C) 98.4 °F (36.9 °C)  98.1 °F (36.7 °C)   SpO2: 97% 97%  93%   Weight:   241 lb 1.6 oz (109.4 kg)        Physical Exam:  General: Well Developed, Well Nourished, No Acute Distress  HEENT: pupils equal and round, no abnormalities noted  Neck: supple, no JVD, no carotid bruits  Heart: S1S2 with RRR without murmurs or gallops  Lungs: Clear throughout auscultation bilaterally without adventitious sounds  Abd: soft, nontender, nondistended, with good bowel sounds  Ext: warm, 2+ edema/anasarca, calves supple/nontender, pulses 2+ bilaterally  Skin: warm and dry  Psychiatric: Normal mood and affect  Neurologic: Alert and oriented X 3    Cardiographics    Telemetry: ordered remote tele  ECG: NA  Echocardiogram: Last echo 4/19 at [facility] showed EF 20-25%, RV normal, mod MR, mod Pul HTN. Labs:   Recent Labs     08/12/19  1608      K 3.8   BUN 15   CREA 1.46   *   WBC 8.6   HGB 9.7*   HCT 32.0*           Assessment/Plan:     Assessment:      Principal Problem:    Cellulitis of scrotum (8/12/2019)- per primary team; on IV abx    Active Problems:    Diabetes ([address] (7/22/2011)      HTN (hypertension) (7/22/2011)- controlled       Acute on chronic systolic CHF (congestive heart failure) ([facility] (8/12/2019)- diuresed over 4 liters overnight. Labs ordered this am. Will continue IV lasix and daily labs. Continue entresto, Toprol and aldactone. Cardiomyopathy ([facility] [address] (8/13/2019)- NICM per [facility] records. See above. Thank you very much for this referral. We appreciate the opportunity to participate in this patient's care. We will follow along with above stated plan.     Misael Dinh NP  Consulting MD: Dr. Cherie Garcia

## 2019-08-13 NOTE — DISCHARGE INSTRUCTIONS

## 2019-08-13 NOTE — CONSULTS
[address]    Name:  Karma Michel  MR#:  [id number]  :  1950  ACCOUNT #:  [de-identified]  DATE OF SERVICE:  2019      HISTORY OF PRESENT ILLNESS:  The patient is a 56-year-old male who went to his primary care doctor with about a week of scrotal cellulitis-type symptoms. He has been using cornstarch on this area and he has tried nystatin power. It did not seem to help. His past medical history is significant for congestive heart failure with an ejection fraction of 20-25% and he has developed scrotal edema and some peripheral edema. He has not had any fever or chills. He does not appear septic. He has had chronic back pain, diabetes, hypertension, obesity. He has had a left total knee replacement and a right total knee replacement. The patient has difficulty ambulating at times. The patient is allergic to PENICILLIN. He is admitted by the hospitalist, was started on Maxipime and Flagyl. Consultation was obtained for Urology because of the scrotal cellulitis and discomfort. LABORATORY DATA:  Significant for a creatinine of 1.46, hemoglobin of 9.7 and a white blood cell count of 8.6. PHYSICAL EXAMINATION:  A pleasant gentleman in no acute distress. On examination of his genital area, he has on his inner thigh a mild edema with rash on the inner thigh and rash in the suprapubic area and intertriginous areas of the inguinal area and down onto the thigh. There is a rash. He did not have any fluctuant masses. It appears that there is edema in the scrotum with the penile skin pushing up around the phallus hiding the glans penis. He has a urinal with somewhat concentrated urine, but he is not having any difficulty voiding. The dependent portion of the scrotum feels sort of moist but this nonfluctuant. There is no purulent drainage or odor. IMPRESSION:  Scrotal cellulitis in this patient with congestive heart failure and some degree of genital edema. I recommend to continue on the present antibiotics. No need for a catheter and I recommend that we give him a shower twice a day along with cornstarch to this area and we will order a scrotal ultrasound tomorrow.       Wynona Duane, MD [id number]  D:  08/12/2019 18:42  T:  08/12/2019 18:47  JOB #:  [id number]

## 2019-08-13 NOTE — PROGRESS NOTES
Hospitalist Progress Note    Patient: Chana Zepeda MRN: [id number]  SSN: [id number]    YOB: 1950  Age: 77 y.o. Sex: male      Admit Date: 8/12/2019    LOS: 1 day     Subjective:     77year old CM with a PMH of CHF with cor pulmonale, PAF, DM2, and HTN admitted for scrotal/perineal cellulitis and anasarca. 8/13 - He feels better today. Swelling improving. Scrotal pain the same. Denies F/C/N/V. Review of systems negative except stated above. Objective:     Visit Vitals  /63   Pulse 82   Temp 98.1 °F (36.7 °C)   Resp 20   Wt 109.4 kg (241 lb 1.6 oz)   SpO2 93%   BMI 37.20 kg/m²      Oxygen Therapy  O2 Sat (%): 93 % (08/13/19 0734)  O2 Device: Room air (08/13/19 0328)      Intake and Output:     Intake/Output Summary (Last 24 hours) at 8/13/2019 1022  Last data filed at 8/13/2019 0620  Gross per 24 hour   Intake 480 ml   Output 4852 ml   Net -4372 ml         Physical Exam:   GENERAL: alert, cooperative, no distress, appears stated age  EYE: conjunctivae/corneas clear. PERRL. THROAT & NECK: normal and no erythema or exudates noted. LUNG: clear to auscultation bilaterally  HEART: regular rate and rhythm, S1S2, no murmur, no JVD  ABDOMEN: soft, non-tender, non-distended. Bowel sounds normal.  GI: Scrotum with edema  EXTREMITIES:  2+ BLE edema, 2+ pedal/radial pulses bilaterally  SKIN: no rash or abnormalities  NEUROLOGIC: A&Ox3. Cranial nerves 2-12 grossly intact.     Lab/Data Review:  Recent Results (from the past 24 hour(s))   CBC WITH AUTOMATED DIFF    Collection Time: 08/12/19  4:08 PM   Result Value Ref Range    WBC 8.6 4.3 - 11.1 K/uL    RBC 4.10 (L) 4.23 - 5.6 M/uL    HGB 9.7 (L) 13.6 - 17.2 g/dL    HCT 32.0 (L) 41.1 - 50.3 %    MCV 78.0 (L) 79.6 - 97.8 FL    MCH 23.7 (L) 26.1 - 32.9 PG    MCHC 30.3 (L) 31.4 - 35.0 g/dL    RDW 22.6 (H) 11.9 - 14.6 %    PLATELET 135 013 - 048 K/uL    MPV 9.5 9.4 - 12.3 FL    ABSOLUTE NRBC 0.00 0.0 - 0.2 K/uL    DF AUTOMATED NEUTROPHILS 78 43 - 78 %    LYMPHOCYTES 8 (L) 13 - 44 %    MONOCYTES 10 4.0 - 12.0 %    EOSINOPHILS 3 0.5 - 7.8 %    BASOPHILS 1 0.0 - 2.0 %    IMMATURE GRANULOCYTES 1 0.0 - 5.0 %    ABS. NEUTROPHILS 6.7 1.7 - 8.2 K/UL    ABS. LYMPHOCYTES 0.7 0.5 - 4.6 K/UL    ABS. MONOCYTES 0.9 0.1 - 1.3 K/UL    ABS. EOSINOPHILS 0.2 0.0 - 0.8 K/UL    ABS. BASOPHILS 0.0 0.0 - 0.2 K/UL    ABS. IMM. GRANS. 0.1 0.0 - 0.5 K/UL   METABOLIC PANEL, COMPREHENSIVE    Collection Time: 08/12/19  4:08 PM   Result Value Ref Range    Sodium 138 136 - 145 mmol/L    Potassium 3.8 3.5 - 5.1 mmol/L    Chloride 106 98 - 107 mmol/L    CO2 26 21 - 32 mmol/L    Anion gap 6 (L) 7 - 16 mmol/L    Glucose 102 (H) 65 - 100 mg/dL    BUN 15 8 - 23 MG/DL    Creatinine 1.46 0.8 - 1.5 MG/DL    GFR est AA >60 >60 ml/min/1.73m2    GFR est non-AA 51 (L) >60 ml/min/1.73m2    Calcium 8.0 (L) 8.3 - 10.4 MG/DL    Bilirubin, total 1.9 (H) 0.2 - 1.1 MG/DL    ALT (SGPT) 14 12 - 65 U/L    AST (SGOT) 22 15 - 37 U/L    Alk.  phosphatase 80 50 - 136 U/L    Protein, total 6.8 6.3 - 8.2 g/dL    Albumin 2.7 (L) 3.2 - 4.6 g/dL    Globulin 4.1 (H) 2.3 - 3.5 g/dL    A-G Ratio 0.7 (L) 1.2 - 3.5     BNP    Collection Time: 08/12/19  4:08 PM   Result Value Ref Range    BNP 2,942 (H) 0 pg/mL   LACTIC ACID    Collection Time: 08/12/19  4:08 PM   Result Value Ref Range    Lactic acid 2.3 (HH) 0.4 - 2.0 MMOL/L   PROCALCITONIN    Collection Time: 08/12/19  4:08 PM   Result Value Ref Range    Procalcitonin 0.2 ng/mL   GLUCOSE, POC    Collection Time: 08/12/19  4:37 PM   Result Value Ref Range    Glucose (POC) 121 (H) 65 - 100 mg/dL   URINALYSIS W/ RFLX MICROSCOPIC    Collection Time: 08/12/19  6:44 PM   Result Value Ref Range    Color JOSE A      Appearance CLEAR      Specific gravity 1.015 1.001 - 1.023      pH (UA) 6.5 5.0 - 9.0      Protein NEGATIVE  NEG mg/dL    Glucose NEGATIVE  mg/dL    Ketone NEGATIVE  NEG mg/dL    Bilirubin NEGATIVE  NEG      Blood NEGATIVE  NEG      Urobilinogen 1.0 0.2 - 1.0 EU/dL    Nitrites NEGATIVE  NEG      Leukocyte Esterase NEGATIVE  NEG     GLUCOSE, POC    Collection Time: 08/12/19  8:44 PM   Result Value Ref Range    Glucose (POC) 123 (H) 65 - 100 mg/dL   LACTIC ACID    Collection Time: 08/12/19 10:49 PM   Result Value Ref Range    Lactic acid 2.2 (HH) 0.4 - 2.0 MMOL/L   LACTIC ACID    Collection Time: 08/13/19  4:47 AM   Result Value Ref Range    Lactic acid 1.5 0.4 - 2.0 MMOL/L   METABOLIC PANEL, BASIC    Collection Time: 08/13/19  4:47 AM   Result Value Ref Range    Sodium 143 136 - 145 mmol/L    Potassium 3.8 3.5 - 5.1 mmol/L    Chloride 110 (H) 98 - 107 mmol/L    CO2 26 21 - 32 mmol/L    Anion gap 7 7 - 16 mmol/L    Glucose 96 65 - 100 mg/dL    BUN 17 8 - 23 MG/DL    Creatinine 1.31 0.8 - 1.5 MG/DL    GFR est AA >60 >60 ml/min/1.73m2    GFR est non-AA 58 (L) >60 ml/min/1.73m2    Calcium 8.0 (L) 8.3 - 10.4 MG/DL   MAGNESIUM    Collection Time: 08/13/19  4:47 AM   Result Value Ref Range    Magnesium 2.0 1.8 - 2.4 mg/dL   GLUCOSE, POC    Collection Time: 08/13/19  5:52 AM   Result Value Ref Range    Glucose (POC) 115 (H) 65 - 100 mg/dL       Imaging:  Xr Chest Sngl V    Result Date: 8/12/2019  History: Shortness of breath Exam: portable chest Comparison: None Findings: No focal alveolar infiltrate or pleural effusion. There is a dual-lead pacemaker overlying the left chest wall. No pneumothorax. Impressions: No acute findings. Us Scrotum/testicles    Result Date: 8/12/2019  EXAM: Ultrasound of the testicles. INDICATION: Pain and erythema. COMPARISON: None. TECHNIQUE: A standard protocol scrotal ultrasound was performed. FINDINGS: Both testicles have a normal ultrasound appearance and demonstrate internal vascularity. The right testicle measures 2.5 x 3.4 x 2.6 cm and the left testicle measures 2.4 x 3.4 x 2.6 cm. The epididymides are unremarkable except for a 5 mm cyst on the left. There are small bilateral hydroceles. The scrotal skin is thickened and edematous. IMPRESSION: 1. Thickened and edematous scrotal skin. 2. Normal bilateral testicles. 3. Small bilateral hydroceles. 4. 5 mm left epididymal cyst.     Ct Urogram Wo Cont    Result Date: 8/13/2019  CT ABDOMEN AND PELVIS WITHOUT CONTRAST 8/13/2019 8:33 AM History:  Scrotal cellulitis. Scrotal pain. Left flank pain. Admission H and P note:Urology c/s. Appreciate recs. No signs of Peter's gangrene clinically. Will obtain U/S scrotum, CT abdomen and pelvis will also be obtained, Technique: Noncontrast  axial images were obtained through the abdomen and pelvis per the renal stone protocol. Coronal reformatted images were generated. Dose reduction techniques were used such as automated exposure control, adjustment of the MA or KV according to patient size, and iterative reconstruction. Comparison:  January 20, 2012 Findings: Included portions of the lung bases demonstrate a right pleural effusion. ABDOMEN:    There are no renal or ureteral calculi. There is no hydronephrosis. Evaluation of the abdominal viscera is limited without IV contrast.  Trace perihepatic ascites is present. The unenhanced appearance of the gallbladder, liver, pancreas, spleen and adrenal glands is normal. The appendix is not well-demonstrated. There is no inflammation in the right lower quadrant. PELVIS: Scattered left periaortic retroperitoneal lymph nodes are present with a representative node just distal to the aortic bifurcation measuring 11 mm short axis (image 45). These have improved compared to the prior CT. The bladder is normal appearance. There is a small amount of free pelvic fluid. Bone windows demonstrated no aggressive osseous lesions. There is diffuse edema throughout the subcutaneous fat of the abdomen and pelvis. IMPRESSION: 1. No renal or ureteral calculi. No hydronephrosis. 2. Improved retroperitoneal adenopathy. 3. Suspected anasarca. No results found for this visit on 08/12/19. Cultures:   All Micro Results None          Assessment/Plan:     Principal Problem:    Cellulitis of scrotum (8/12/2019)   - Improving  - Swelling improved  - Continue Cefepime + Flagyl  - Urology following    Active Problems:    Acute on chronic systolic CHF (congestive heart failure) ([facility] (8/12/2019)  - Improving  - Continue IV Lasix  - Strict 1/0 - 4L UOP x 24 hours  - Appreciate Cardiology's input and assistance      Anasarca (8/13/2019)  - Due to cor pulmonale  - Improving  - Continue IV Lasix  - Strict 1/0 - 4L UOP x 24 hours      Cardiomyopathy ([facility] (8/13/2019)      Cor pulmonale (chronic) (HCC) (8/13/2019)  - Continue IV Lasix  - Strict I/O      HTN (hypertension) (7/22/2011)  - Stable  - Continue Entresto  - Continue Spironolactone  - Contineu Toprol      Paroxysmal atrial fibrillation (8/13/2019)  - No acute issues  - Continue Toprol  - Continue Amiodarone  - Continue Eliquis      Diabetes mellitus type 2, controlled ([facility] (12/5/2011)  - No acute issues  - Continue Humalog SSI  - Holding Metformin    Dispo - Continue Lasix. Continue abx. Discharge home in next 2-3 days.     DIET DIABETIC CONSISTENT CARB Regular; 2 GM NA (House Low NA); FR 1500ML    DVT Prophylaxis: Eliquis      Signed By: Renny Sun,      August 13, 2019

## 2019-08-13 NOTE — DIABETES MGMT
Patient admitted with cellulitis of scrotum. Admitting blood glucose 102. HbA1c in July 7.0% (eAG 154). Blood glucose range yesterday 102-123 with pt receiving no diabetic medication. This AM blood glucose 115. Reviewed pt current regimen: Humalog SSI. Pt in bed, no visitors present. Pt states he was diagnosed with diabetes in 2009. Pt voices a positive family history of diabetes. Pt states he has a working glucometer and supplies at home. Pt states he typically checks his blood glucose \"once or twice\" a day stating \"it's been around 120. \" Pt states he takes Januvia and Metformin at home. Pt has used insulin pens in the past. Pt denies recent hypoglycemia. Hypoglycemia signs, symptoms, and treatment reviewed. Pt states he has attended formal diabetic education in the past. Pt has PCP that manages his diabetes. Pt voices no further questions regarding diabetes management at this time.

## 2019-08-13 NOTE — PROGRESS NOTES
Cardiac monitoring in place pt noted to have [address] NP with cardiology notified she assessed pt stated it is P waves and pt is stable. Pt denies pain at current time. Asymptomatic will continue to monitor.

## 2019-08-13 NOTE — PROGRESS NOTES
Admit Date: 8/12/2019    Subjective:     Camilo Pollack is eating breakfast, no complaints. Reports scrotal discomfort is somewhat better today. Objective:     Patient Vitals for the past 8 hrs:   BP Temp Pulse Resp SpO2 Weight   08/13/19 0734 109/63 98.1 °F (36.7 °C) 82 20 93 %    08/13/19 0417      241 lb 1.6 oz (109.4 kg)   08/13/19 0416 111/67 98.4 °F (36.9 °C) 71 19 97 %    08/13/19 0004 110/68 98.6 °F (37 °C) 77 19 97 %      No intake/output data recorded. 08/11 1901 - 08/13 0700  In: 480 [P.O.:480]  Out: 4852 [Urine:4852]    Physical Exam:  GENERAL: alert, cooperative, no distress  LUNG: clear to auscultation bilaterally  HEART: regular rate and rhythm, S1, S2   ABDOMEN: soft, non-tender, active BS  NEUROLOGIC: AOx3  : scrotal swelling, somewhat improved since yesterday per pt        Data Review   Recent Results (from the past 24 hour(s))   CBC WITH AUTOMATED DIFF    Collection Time: 08/12/19  4:08 PM   Result Value Ref Range    WBC 8.6 4.3 - 11.1 K/uL    RBC 4.10 (L) 4.23 - 5.6 M/uL    HGB 9.7 (L) 13.6 - 17.2 g/dL    HCT 32.0 (L) 41.1 - 50.3 %    MCV 78.0 (L) 79.6 - 97.8 FL    MCH 23.7 (L) 26.1 - 32.9 PG    MCHC 30.3 (L) 31.4 - 35.0 g/dL    RDW 22.6 (H) 11.9 - 14.6 %    PLATELET 959 082 - 769 K/uL    MPV 9.5 9.4 - 12.3 FL    ABSOLUTE NRBC 0.00 0.0 - 0.2 K/uL    DF AUTOMATED      NEUTROPHILS 78 43 - 78 %    LYMPHOCYTES 8 (L) 13 - 44 %    MONOCYTES 10 4.0 - 12.0 %    EOSINOPHILS 3 0.5 - 7.8 %    BASOPHILS 1 0.0 - 2.0 %    IMMATURE GRANULOCYTES 1 0.0 - 5.0 %    ABS. NEUTROPHILS 6.7 1.7 - 8.2 K/UL    ABS. LYMPHOCYTES 0.7 0.5 - 4.6 K/UL    ABS. MONOCYTES 0.9 0.1 - 1.3 K/UL    ABS. EOSINOPHILS 0.2 0.0 - 0.8 K/UL    ABS. BASOPHILS 0.0 0.0 - 0.2 K/UL    ABS. IMM.  GRANS. 0.1 0.0 - 0.5 K/UL   METABOLIC PANEL, COMPREHENSIVE    Collection Time: 08/12/19  4:08 PM   Result Value Ref Range    Sodium 138 136 - 145 mmol/L    Potassium 3.8 3.5 - 5.1 mmol/L    Chloride 106 98 - 107 mmol/L    CO2 26 21 - 32 mmol/L    Anion gap 6 (L) 7 - 16 mmol/L    Glucose 102 (H) 65 - 100 mg/dL    BUN 15 8 - 23 MG/DL    Creatinine 1.46 0.8 - 1.5 MG/DL    GFR est AA >60 >60 ml/min/1.73m2    GFR est non-AA 51 (L) >60 ml/min/1.73m2    Calcium 8.0 (L) 8.3 - 10.4 MG/DL    Bilirubin, total 1.9 (H) 0.2 - 1.1 MG/DL    ALT (SGPT) 14 12 - 65 U/L    AST (SGOT) 22 15 - 37 U/L    Alk.  phosphatase 80 50 - 136 U/L    Protein, total 6.8 6.3 - 8.2 g/dL    Albumin 2.7 (L) 3.2 - 4.6 g/dL    Globulin 4.1 (H) 2.3 - 3.5 g/dL    A-G Ratio 0.7 (L) 1.2 - 3.5     BNP    Collection Time: 08/12/19  4:08 PM   Result Value Ref Range    BNP 2,942 (H) 0 pg/mL   LACTIC ACID    Collection Time: 08/12/19  4:08 PM   Result Value Ref Range    Lactic acid 2.3 (HH) 0.4 - 2.0 MMOL/L   PROCALCITONIN    Collection Time: 08/12/19  4:08 PM   Result Value Ref Range    Procalcitonin 0.2 ng/mL   GLUCOSE, POC    Collection Time: 08/12/19  4:37 PM   Result Value Ref Range    Glucose (POC) 121 (H) 65 - 100 mg/dL   URINALYSIS W/ RFLX MICROSCOPIC    Collection Time: 08/12/19  6:44 PM   Result Value Ref Range    Color JOSE A      Appearance CLEAR      Specific gravity 1.015 1.001 - 1.023      pH (UA) 6.5 5.0 - 9.0      Protein NEGATIVE  NEG mg/dL    Glucose NEGATIVE  mg/dL    Ketone NEGATIVE  NEG mg/dL    Bilirubin NEGATIVE  NEG      Blood NEGATIVE  NEG      Urobilinogen 1.0 0.2 - 1.0 EU/dL    Nitrites NEGATIVE  NEG      Leukocyte Esterase NEGATIVE  NEG     GLUCOSE, POC    Collection Time: 08/12/19  8:44 PM   Result Value Ref Range    Glucose (POC) 123 (H) 65 - 100 mg/dL   LACTIC ACID    Collection Time: 08/12/19 10:49 PM   Result Value Ref Range    Lactic acid 2.2 (HH) 0.4 - 2.0 MMOL/L   LACTIC ACID    Collection Time: 08/13/19  4:47 AM   Result Value Ref Range    Lactic acid 1.5 0.4 - 2.0 MMOL/L   GLUCOSE, POC    Collection Time: 08/13/19  5:52 AM   Result Value Ref Range    Glucose (POC) 115 (H) 65 - 100 mg/dL       Assessment:     Principal Problem:    Cellulitis of scrotum (8/12/2019)    Active Problems:    Diabetes ([facility] [address] (7/22/2011)      HTN (hypertension) (7/22/2011)      Acute on chronic systolic CHF (congestive heart failure) ([facility] (8/12/2019)        Scrotal cellulitis. Wbc 8.6  VSS, afebrile    Scrotal US: IMPRESSION:   1. Thickened and edematous scrotal skin. 2. Normal bilateral testicles. 3. Small bilateral hydroceles. 4. 5 mm left epididymal cyst.     Plan:     Scrotal US with no acute findings. Recommend to elevate scrotum with towel. Keep area clean/dry. Continue abx. Patient is seen and examined in collaboration with Dr. Bisi Conteh. Assessment and plan as per Dr. Arturo Shah. I have reviewed the above note and examined the patient. I agree with the exam, assessment and plan.     MD Zak Mejia, [address] [facility]. Urology

## 2019-08-13 NOTE — PROGRESS NOTES
Problem: Self Care Deficits Care Plan (Adult)  Goal: *Acute Goals and Plan of Care (Insert Text)  Description  1. Patient will complete lower body bathing and dressing with supervision and adaptive equipment as needed. 2. Patient will complete toileting with modified independence and adaptive equipment as needed. 3. Patient will complete functional transfers with modified independence and adaptive equipment as needed. 4. Patient will demonstrate modified independence with therapeutic exercise HEP to increase strength in BUEs for increased safety and independence with functional transfers. 5. Patient will complete functional mobility for ADLs with supervision and adaptive equipment as needed. Timeframe: 7 visits      Outcome: Progressing Towards Goal     OCCUPATIONAL THERAPY: Initial Assessment, Daily Note and AM 8/13/2019  INPATIENT: OT Visit Days: 1  [place] / [place] / Product Type: Medicare /      NAME/AGE/GENDER: Hilton De La Vega is a 77 y.o. male   PRIMARY DIAGNOSIS:  Acute on chronic systolic CHF (congestive heart failure) ([facility] [address] [I50.23] Cellulitis of scrotum   Cellulitis of scrotum          ICD-10: Treatment Diagnosis:    Other lack of cordination (R27.8)  History of falling (Z91.81)   Precautions/Allergies:    Fall precautions  Adhesive; Other medication; Pcn [penicillins]; and Pollen extracts      ASSESSMENT:     Mr. Vicente Francois is a 77 y.o. male admitted with cellulitis of the scrotum. At baseline pt lives with wife and reports independence to modified independence with ADLs, driving, and ambulation, utilizing cane typically but RW for the past few days. Pt endorses 1 recent fall. Upon arrival pt alert and agreeable to OT evaluation and treatment. BUE assessment revealed AROM WFL and strength generally decreased but functional in BUEs. Edema noted in BLEs. Pt completed bed mobility with Mayito/additional time, demonstrates good sitting balance.      Treatment initiated to include functional transfers with CGA progressing to SBA, toileting with SBA-supervision, LB dressing with TA for socks, SBA for elastic shorts, and grooming in standing with SBA. Pt left with PT. Pt presents with deficits in swelling/edema, pain, strength, balance, functional mobility and functional transfers, all of which negatively impact safety and independence with ADLs. Nohemi Boyd is currently functioning below baseline and would benefit from continued OT to increase safety and independence with ADLs. Will follow. This section established at most recent assessment   PROBLEM LIST (Impairments causing functional limitations):  Decreased Strength  Decreased ADL/Functional Activities  Decreased Transfer Abilities  Decreased Ambulation Ability/Technique  Decreased Balance  Increased Pain  Decreased Activity Tolerance  Edema/Girth  Decreased Skin Integrity/Hygeine   INTERVENTIONS PLANNED: (Benefits and precautions of occupational therapy have been discussed with the patient.)  Activities of daily living training  Adaptive equipment training  Balance training  Clothing management  Donning&doffing training  Hygiene training  Neuromuscular re-eduation  Re-evaluation  Therapeutic activity  Therapeutic exercise     TREATMENT PLAN: Frequency/Duration: Follow patient 3x/week to address above goals. Rehabilitation Potential For Stated Goals: Good     REHAB RECOMMENDATIONS (at time of discharge pending progress):    Placement: It is my opinion, based on this patient's performance to date, that Mr. Bryan Henning may benefit from [address] after discharge due to the functional deficits listed above that are likely to improve with skilled rehabilitation because he/she has multiple medical issues that affect his/her functional mobility in the community.   Equipment:   None at this time              OCCUPATIONAL PROFILE AND HISTORY:   History of Present Injury/Illness (Reason for Referral):  See H&P. Past Medical History/Comorbidities:   Mr. 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 Mr. Leopold Brunswick  has a past surgical history that includes hx orthopaedic (July 20,2011). Social History/Living Environment:   Home Environment: Private residence  # Steps to Enter: 0  One/Two Story Residence: One story  Living Alone: No  Support Systems: Spouse/Significant Other/Partner  Patient Expects to be Discharged to[de-identified] Private residence  Current DME Used/Available at Home: Cane, straight, Walker, rolling, Shower chair, Grab bars, CPAP  Tub or Shower Type: Shower  Prior Level of Function/Work/Activity:  At baseline pt lives with wife and reports independence to modified independence with ADLs, driving, and ambulation, utilizing cane typically but RW for the past few days. Pt endorses 1 recent fall. Personal Factors:          Sex:  male        Age:  77 y.o. Other factors that influence how disability is experienced by the patient:  Multiple co-morbidities     Number of Personal Factors/Comorbidities that affect the Plan of Care: Expanded review of therapy/medical records (1-2):  MODERATE COMPLEXITY   ASSESSMENT OF OCCUPATIONAL PERFORMANCE[de-identified]   Activities of Daily Living:   Basic ADLs (From Assessment) Complex ADLs (From Assessment)   Feeding: Independent  Oral Facial Hygiene/Grooming: Stand-by assistance  Bathing: Minimum assistance  Upper Body Dressing: Stand-by assistance  Lower Body Dressing: Moderate assistance  Toileting: Stand by assistance Instrumental ADL  Homemaking: Maximum assistance   Grooming/Bathing/Dressing Activities of Daily Living   Grooming  Washing Hands: Stand-by assistance Cognitive Retraining  Safety/Judgement: Awareness of environment; Fall prevention; Insight into deficits           Toileting  Bladder Hygiene: Supervision  Clothing Management: Stand-by assistance     Functional Transfers  Bathroom Mobility: Contact guard assistance;Stand-by assistance  Toilet Transfer : Contact guard assistance;Stand-by assistance   Lower Body Dressing Assistance  Socks: Total assistance (dependent) Bed/Mat Mobility  Supine to Sit: Minimum assistance  Sit to Stand: Contact guard assistance;Stand-by assistance  Stand to Sit: Contact guard assistance;Stand-by assistance  Scooting: Stand-by assistance; Additional time     Most Recent Physical Functioning:   Gross Assessment:  AROM: Within functional limits  Strength: Generally decreased, functional               Posture:     Balance:  Sitting: Intact; Without support  Standing: Impaired  Standing - Static: Good;Constant support  Standing - Dynamic : Fair;Good;Constant support Bed Mobility:  Supine to Sit: Minimum assistance  Scooting: Stand-by assistance; Additional time  Wheelchair Mobility:     Transfers:  Sit to Stand: Contact guard assistance;Stand-by assistance  Stand to Sit: Contact guard assistance;Stand-by assistance            Patient Vitals for the past 6 hrs:   BP SpO2 Pulse   08/13/19 0734 109/63 93 % 82       Mental Status  Neurologic State: Alert  Orientation Level: Appropriate for age, Oriented X4  Cognition: Appropriate decision making, Appropriate for age attention/concentration, Follows commands  Perception: Appears intact  Perseveration: No perseveration noted  Safety/Judgement: Awareness of environment, Fall prevention, Insight into deficits                          Physical Skills Involved:  Balance  Strength  Pain (acute)  Edema  Skin Integrity Cognitive Skills Affected (resulting in the inability to perform in a timely and safe manner):  None  Psychosocial Skills Affected:  Habits/Routines  Self-Awareness  Social Roles   Number of elements that affect the Plan of Care: 5+:  HIGH COMPLEXITY   CLINICAL DECISION MAKIN[address] [place] [id number] AM-PAC 6 Clicks   Daily Activity Inpatient Short Form  How much help from another person does the patient currently need. .. Total A Lot A Little None   1. Putting on and taking off regular lower body clothing? ? 1   ? 2   ? 3   ? 4   2. Bathing (including washing, rinsing, drying)? ? 1   ? 2   ? 3   ? 4   3. Toileting, which includes using toilet, bedpan or urinal?   ? 1   ? 2   ? 3   ? 4   4. Putting on and taking off regular upper body clothing? ? 1   ? 2   ? 3   ? 4   5. Taking care of personal grooming such as brushing teeth? ? 1   ? 2   ? 3   ? 4   6. Eating meals? ? 1   ? 2   ? 3   ? 4   © , Trustees of [address] [place] [id number], under license to [employer]. All rights reserved      Score:  Initial: 18 19 Most Recent: X (Date: -- )    Interpretation of Tool:  Represents activities that are increasingly more difficult (i.e. Bed mobility, Transfers, Gait). Medical Necessity:     Patient demonstrates good   rehab potential due to higher previous functional level. Reason for Services/Other Comments:  Patient continues to require skilled intervention due to inability to complete ADLs at prior level of independence   . Use of outcome tool(s) and clinical judgement create a POC that gives a: MODERATE COMPLEXITY         TREATMENT:   (In addition to Assessment/Re-Assessment sessions the following treatments were rendered)     Pre-treatment Symptoms/Complaints:    Pain: Initial:   Pain Intensity 1: 4  Pain Location 1: Flank  Pain Orientation 1: Left  Pain Intervention(s) 1: Ambulation/Increased Activity, Repositioned  Post Session:  same     Self Care: (8 minutes): Procedure(s) (per grid) utilized to improve and/or restore self-care/home management as related to dressing, toileting and grooming. Required minimal visual, verbal and manual cueing to facilitate activities of daily living skills and compensatory activities.  Treatment initiated to include functional transfers with CGA progressing to SBA, toileting with SBA-supervision, LB dressing with TA for socks, SBA for elastic shorts, and grooming in standing with SBA. Braces/Orthotics/Lines/Etc:   Telemetry   O2 Device: Room air  Treatment/Session Assessment:    Response to Treatment:  Tolerated well   Interdisciplinary Collaboration:   Physical Therapist  Occupational Therapist  Registered Nurse  Physician  Certified Nursing Assistant/Patient Care Technician  After treatment position/precautions:   Bed/Chair-wheels locked  Bed in low position  With PT    Compliance with Program/Exercises: Compliant all of the time, Will assess as treatment progresses. Recommendations/Intent for next treatment session: \"Next visit will focus on advancements to more challenging activities and reduction in assistance provided\".   Total Treatment Duration:  OT Patient Time In/Time Out  Time In: 0954  Time Out: Armando 66, OTR/L

## 2019-08-13 NOTE — PROGRESS NOTES
Spiritual Care Visit, initial visit. Visited with patient at bedside. Prayed for patient's healing and health. Visit by Art López, Staff .  Nehal, Joycelyn.B., B.A.

## 2019-08-13 NOTE — PROGRESS NOTES
Patient admitted overnight. He is independent at baseline. Discharge needs pending. Case Management will follow for discharge planning. Care Management Interventions  PCP Verified by CM:  Yes  Transition of Care Consult (CM Consult): Discharge Planning  Discharge Durable Medical Equipment: No  Physical Therapy Consult: Yes  Occupational Therapy Consult: Yes  Speech Therapy Consult: No  Current Support Network: Own Home  Confirm Follow Up Transport: Family  Plan discussed with Pt/Family/Caregiver: Yes  Freedom of Choice Offered: Yes  Discharge Location  Discharge Placement: Home

## 2019-08-13 NOTE — PROGRESS NOTES
Pt resting in bed. RR even/unlabored. NAD noted. Call light within reach. Safety measures in place. Encouraged to call PRN assist. Preparing to give report to oncoming RN.

## 2019-08-13 NOTE — WOUND CARE
Patient seen for groin redness and moisture damage. No open area but is very tender and swollen. Recommend soft absorptive dressing (ABD ) to help manage moisture and treat intertrigo. Discussed with patient and primary nurse. Wound team will sign off, please call if needed.

## 2019-08-14 ENCOUNTER — APPOINTMENT (OUTPATIENT)
Dept: ULTRASOUND IMAGING | Age: 69
DRG: 727 | End: 2019-08-14
Attending: UROLOGY
Payer: MEDICARE

## 2019-08-14 LAB
ANION GAP SERPL CALC-SCNC: 7 MMOL/L (ref 7–16)
BUN SERPL-MCNC: 16 MG/DL (ref 8–23)
CALCIUM SERPL-MCNC: 7.7 MG/DL (ref 8.3–10.4)
CHLORIDE SERPL-SCNC: 109 MMOL/L (ref 98–107)
CO2 SERPL-SCNC: 26 MMOL/L (ref 21–32)
CREAT SERPL-MCNC: 1.19 MG/DL (ref 0.8–1.5)
GLUCOSE BLD STRIP.AUTO-MCNC: 106 MG/DL (ref 65–100)
GLUCOSE BLD STRIP.AUTO-MCNC: 120 MG/DL (ref 65–100)
GLUCOSE BLD STRIP.AUTO-MCNC: 138 MG/DL (ref 65–100)
GLUCOSE BLD STRIP.AUTO-MCNC: 158 MG/DL (ref 65–100)
GLUCOSE SERPL-MCNC: 117 MG/DL (ref 65–100)
MAGNESIUM SERPL-MCNC: 1.9 MG/DL (ref 1.8–2.4)
POTASSIUM SERPL-SCNC: 3.6 MMOL/L (ref 3.5–5.1)
SODIUM SERPL-SCNC: 142 MMOL/L (ref 136–145)

## 2019-08-14 PROCEDURE — 36415 COLL VENOUS BLD VENIPUNCTURE: CPT

## 2019-08-14 PROCEDURE — 65270000029 HC RM PRIVATE

## 2019-08-14 PROCEDURE — 82962 GLUCOSE BLOOD TEST: CPT

## 2019-08-14 PROCEDURE — 83735 ASSAY OF MAGNESIUM: CPT

## 2019-08-14 PROCEDURE — 80048 BASIC METABOLIC PNL TOTAL CA: CPT

## 2019-08-14 PROCEDURE — 74011250636 HC RX REV CODE- 250/636: Performed by: INTERNAL MEDICINE

## 2019-08-14 PROCEDURE — 74011250637 HC RX REV CODE- 250/637: Performed by: HOSPITALIST

## 2019-08-14 PROCEDURE — 74011000258 HC RX REV CODE- 258: Performed by: HOSPITALIST

## 2019-08-14 PROCEDURE — 74011250636 HC RX REV CODE- 250/636: Performed by: HOSPITALIST

## 2019-08-14 PROCEDURE — 74011636637 HC RX REV CODE- 636/637: Performed by: HOSPITALIST

## 2019-08-14 RX ORDER — FUROSEMIDE 10 MG/ML
40 INJECTION INTRAMUSCULAR; INTRAVENOUS DAILY
Status: DISCONTINUED | OUTPATIENT
Start: 2019-08-15 | End: 2019-08-16

## 2019-08-14 RX ADMIN — APIXABAN 5 MG: 2.5 TABLET, FILM COATED ORAL at 17:50

## 2019-08-14 RX ADMIN — Medication 10 ML: at 12:07

## 2019-08-14 RX ADMIN — APIXABAN 5 MG: 2.5 TABLET, FILM COATED ORAL at 08:09

## 2019-08-14 RX ADMIN — METRONIDAZOLE 500 MG: 500 INJECTION, SOLUTION INTRAVENOUS at 03:42

## 2019-08-14 RX ADMIN — Medication 10 ML: at 21:40

## 2019-08-14 RX ADMIN — PRAVASTATIN SODIUM 40 MG: 20 TABLET ORAL at 21:39

## 2019-08-14 RX ADMIN — VANCOMYCIN HYDROCHLORIDE 1500 MG: 10 INJECTION, POWDER, LYOPHILIZED, FOR SOLUTION INTRAVENOUS at 08:10

## 2019-08-14 RX ADMIN — METOPROLOL SUCCINATE 100 MG: 50 TABLET, EXTENDED RELEASE ORAL at 08:10

## 2019-08-14 RX ADMIN — SACUBITRIL AND VALSARTAN 1 TABLET: 24; 26 TABLET, FILM COATED ORAL at 17:50

## 2019-08-14 RX ADMIN — CEFEPIME 2 G: 2 INJECTION, POWDER, FOR SOLUTION INTRAVENOUS at 17:50

## 2019-08-14 RX ADMIN — FUROSEMIDE 60 MG: 10 INJECTION, SOLUTION INTRAMUSCULAR; INTRAVENOUS at 08:10

## 2019-08-14 RX ADMIN — SACUBITRIL AND VALSARTAN 1 TABLET: 24; 26 TABLET, FILM COATED ORAL at 09:42

## 2019-08-14 RX ADMIN — SPIRONOLACTONE 12.5 MG: 25 TABLET ORAL at 09:42

## 2019-08-14 RX ADMIN — LORATADINE 10 MG: 10 TABLET ORAL at 08:09

## 2019-08-14 RX ADMIN — Medication 10 ML: at 06:05

## 2019-08-14 RX ADMIN — CEFEPIME 2 G: 2 INJECTION, POWDER, FOR SOLUTION INTRAVENOUS at 03:01

## 2019-08-14 RX ADMIN — METRONIDAZOLE 500 MG: 500 INJECTION, SOLUTION INTRAVENOUS at 17:51

## 2019-08-14 RX ADMIN — INSULIN LISPRO 2 UNITS: 100 INJECTION, SOLUTION INTRAVENOUS; SUBCUTANEOUS at 12:05

## 2019-08-14 RX ADMIN — AMIODARONE HYDROCHLORIDE 200 MG: 200 TABLET ORAL at 08:09

## 2019-08-14 NOTE — PROGRESS NOTES
Received bedside shift report from [facility], [place]. Patient resting quietly in bed at this time, awake, respirations even and unlabored on room air. Denies needs at this time. Bed low and locked. Bedside table, personal belongings and call light within reach.

## 2019-08-14 NOTE — PROGRESS NOTES
Seen yesterday afternoon and he thought he was improving some   No testicular pathology or abscess,  No suspicion for orin's infection    Seth SHAFER.

## 2019-08-14 NOTE — DIABETES MGMT
Patient admitted with cellulitis of scrotum. Blood glucose range yesterday  with pt receiving no diabetic medication. This AM blood glucose 120. Pt blood glucose levels currently in hospital target goals per ADA standards. Pt has Humalog SSI ordered. If pt remains at goal would recommend decreasing frequency of blood glucose checks. Will follow along loosely.

## 2019-08-14 NOTE — PROGRESS NOTES
Patient awake resting in bed. Respirations present. No signs of distress. Bed low and locked. Call light within reach. Bedside report given to oncoming RNOnur.

## 2019-08-14 NOTE — ROUTINE PROCESS
CHF teaching reinforced from previous instruction: emphasis on reporting worsening daily Wts, dyspnea, verbalize understanding: 15 min more. CC appt ;  CINTHIA swift Racquel

## 2019-08-14 NOTE — PROGRESS NOTES
Pt resting in bed. RR even/unlabored. NAD noted. Call light within reach. Safety measures in place. Encouraged to call PRN assist. Assessment complete. Will continue to monitor.

## 2019-08-14 NOTE — PROGRESS NOTES
Hospitalist Progress Note    Patient: Pavan López MRN: [id number]  SSN: [id number]    YOB: 1950  Age: 77 y.o. Sex: male      Admit Date: 8/12/2019    LOS: 2 days     Subjective:     77year old CM with a PMH of CHF with cor pulmonale, PAF, DM2, and HTN admitted for scrotal/perineal cellulitis and anasarca. 8/14 - He feels better today. Swelling improving. Scrotal pain improved. Denies F/C/N/V. Review of systems negative except stated above. Objective:     Visit Vitals  /70   Pulse 82   Temp 98.1 °F (36.7 °C)   Resp 18   Wt 102.7 kg (226 lb 6.4 oz)   SpO2 93%   BMI 34.94 kg/m²      Oxygen Therapy  O2 Sat (%): 93 % (08/14/19 1541)  O2 Device: Room air (08/14/19 0315)      Intake and Output:     Intake/Output Summary (Last 24 hours) at 8/14/2019 1703  Last data filed at 8/14/2019 1614  Gross per 24 hour   Intake 960 ml   Output 6725 ml   Net -5765 ml         Physical Exam:   GENERAL: alert, cooperative, no distress, appears stated age  EYE: conjunctivae/corneas clear. PERRL. THROAT & NECK: normal and no erythema or exudates noted. LUNG: clear to auscultation bilaterally  HEART: regular rate and rhythm, S1S2, no murmur, no JVD  ABDOMEN: soft, non-tender, non-distended. Bowel sounds normal.  GI: Scrotum with edema  EXTREMITIES:  1+ BLE edema, 2+ pedal/radial pulses bilaterally  SKIN: no rash or abnormalities  NEUROLOGIC: A&Ox3. Cranial nerves 2-12 grossly intact.     Lab/Data Review:  Recent Results (from the past 24 hour(s))   GLUCOSE, POC    Collection Time: 08/13/19  8:52 PM   Result Value Ref Range    Glucose (POC) 140 (H) 65 - 737 mg/dL   METABOLIC PANEL, BASIC    Collection Time: 08/14/19  4:11 AM   Result Value Ref Range    Sodium 142 136 - 145 mmol/L    Potassium 3.6 3.5 - 5.1 mmol/L    Chloride 109 (H) 98 - 107 mmol/L    CO2 26 21 - 32 mmol/L    Anion gap 7 7 - 16 mmol/L    Glucose 117 (H) 65 - 100 mg/dL    BUN 16 8 - 23 MG/DL    Creatinine 1.19 0.8 - 1.5 MG/DL GFR est AA >60 >60 ml/min/1.73m2    GFR est non-AA >60 >60 ml/min/1.73m2    Calcium 7.7 (L) 8.3 - 10.4 MG/DL   MAGNESIUM    Collection Time: 08/14/19  4:11 AM   Result Value Ref Range    Magnesium 1.9 1.8 - 2.4 mg/dL   GLUCOSE, POC    Collection Time: 08/14/19  5:43 AM   Result Value Ref Range    Glucose (POC) 120 (H) 65 - 100 mg/dL   GLUCOSE, POC    Collection Time: 08/14/19 11:16 AM   Result Value Ref Range    Glucose (POC) 158 (H) 65 - 100 mg/dL   GLUCOSE, POC    Collection Time: 08/14/19  4:21 PM   Result Value Ref Range    Glucose (POC) 106 (H) 65 - 100 mg/dL       Imaging:  Xr Chest Sngl V    Result Date: 8/12/2019  History: Shortness of breath Exam: portable chest Comparison: None Findings: No focal alveolar infiltrate or pleural effusion. There is a dual-lead pacemaker overlying the left chest wall. No pneumothorax. Impressions: No acute findings. Us Scrotum/testicles    Result Date: 8/12/2019  EXAM: Ultrasound of the testicles. INDICATION: Pain and erythema. COMPARISON: None. TECHNIQUE: A standard protocol scrotal ultrasound was performed. FINDINGS: Both testicles have a normal ultrasound appearance and demonstrate internal vascularity. The right testicle measures 2.5 x 3.4 x 2.6 cm and the left testicle measures 2.4 x 3.4 x 2.6 cm. The epididymides are unremarkable except for a 5 mm cyst on the left. There are small bilateral hydroceles. The scrotal skin is thickened and edematous. IMPRESSION: 1. Thickened and edematous scrotal skin. 2. Normal bilateral testicles. 3. Small bilateral hydroceles. 4. 5 mm left epididymal cyst.     Ct Urogram Wo Cont    Result Date: 8/13/2019  CT ABDOMEN AND PELVIS WITHOUT CONTRAST 8/13/2019 8:33 AM History:  Scrotal cellulitis. Scrotal pain. Left flank pain. Admission H and P note:Urology c/s. Appreciate recs. No signs of Peter's gangrene clinically.  Will obtain U/S scrotum, CT abdomen and pelvis will also be obtained, Technique: Noncontrast  axial images were obtained through the abdomen and pelvis per the renal stone protocol. Coronal reformatted images were generated. Dose reduction techniques were used such as automated exposure control, adjustment of the MA or KV according to patient size, and iterative reconstruction. Comparison:  January 20, 2012 Findings: Included portions of the lung bases demonstrate a right pleural effusion. ABDOMEN:    There are no renal or ureteral calculi. There is no hydronephrosis. Evaluation of the abdominal viscera is limited without IV contrast.  Trace perihepatic ascites is present. The unenhanced appearance of the gallbladder, liver, pancreas, spleen and adrenal glands is normal. The appendix is not well-demonstrated. There is no inflammation in the right lower quadrant. PELVIS: Scattered left periaortic retroperitoneal lymph nodes are present with a representative node just distal to the aortic bifurcation measuring 11 mm short axis (image 45). These have improved compared to the prior CT. The bladder is normal appearance. There is a small amount of free pelvic fluid. Bone windows demonstrated no aggressive osseous lesions. There is diffuse edema throughout the subcutaneous fat of the abdomen and pelvis. IMPRESSION: 1. No renal or ureteral calculi. No hydronephrosis. 2. Improved retroperitoneal adenopathy. 3. Suspected anasarca. No results found for this visit on 08/12/19. Cultures:   All Micro Results     None          Assessment/Plan:     Principal Problem:    Cellulitis of scrotum (8/12/2019)   - Improving  - Swelling improved  - Continue Cefepime + Flagyl --> change to PO Vantin and Flagyl in AM  - Urology following    Active Problems:    Acute on chronic systolic CHF (congestive heart failure) ([facility] [address] (8/12/2019)  - Improving  - Continue IV Lasix  - Strict 1/0 - 4L UOP x 24 hours  - Appreciate Cardiology's input and assistance      Anasarca (8/13/2019)  - Due to cor pulmonale  - Improving  - Continue IV Lasix  - Strict 1/0 - 4L UOP x 24 hours      Cardiomyopathy ([facility]) (8/13/2019)      Cor pulmonale (chronic) ([facility]) (8/13/2019)  - Continue IV Lasix  - Strict I/O      HTN (hypertension) (7/22/2011)  - Stable  - Continue Entresto  - Continue Spironolactone  - Contineu Toprol      Paroxysmal atrial fibrillation (8/13/2019)  - No acute issues  - Continue Toprol  - Continue Amiodarone  - Continue Eliquis      Diabetes mellitus type 2, controlled ([facility]) (12/5/2011)  - No acute issues  - Continue Humalog SSI  - Holding Metformin    Dispo - Continue Lasix. Continue abx. Discharge home in next 1-2 days.     DIET DIABETIC CONSISTENT CARB Regular; 2 GM NA (House Low NA); FR 1500ML    DVT Prophylaxis: Eliquis      Signed By: Shanda Dunaway, DO     August 14, 2019

## 2019-08-14 NOTE — PROGRESS NOTES
Interdisciplinary team rounds were held 8/14/2019 with the following team members:Care Management, Nursing, Physical Therapy, Physician and Clinical Coordinator and the patient. Plan of care discussed. See clinical pathway and/or care plan for interventions and desired outcomes.

## 2019-08-14 NOTE — PROGRESS NOTES
[facility] CARDIOLOGY PROGRESS NOTE           8/14/2019 11:34 AM    Admit Date: 8/12/2019      Subjective:   Swelling improved     Review of Systems   Respiratory: Positive for cough. Cardiovascular: Negative for chest pain. Gastrointestinal: Negative for diarrhea. Objective:      Vitals:    08/14/19 0352 08/14/19 0415 08/14/19 0734 08/14/19 0937   BP: 108/68  112/72 114/76   Pulse: 85 81 81 77   Resp: 19  20    Temp: 98.6 °F (37 °C)  98.1 °F (36.7 °C)    SpO2: 98%  91%    Weight: 102.7 kg (226 lb 6.4 oz)            Physical Exam   Constitutional: He appears well-developed. HENT:   Head: Normocephalic. Eyes: Right eye exhibits no discharge. Neck: No JVD present. Cardiovascular: Normal rate. Pulmonary/Chest: He is in respiratory distress. Abdominal: He exhibits no distension. Musculoskeletal: He exhibits no edema.        Data Review:   Recent Labs     08/14/19  0411 08/13/19  0447 08/12/19  1608    143 138   K 3.6 3.8 3.8   MG 1.9 2.0  --    BUN 16 17 15   CREA 1.19 1.31 1.46   * 96 102*   WBC  --   --  8.6   HGB  --   --  9.7*   HCT  --   --  32.0*   PLT  --   --  257         Intake/Output Summary (Last 24 hours) at 8/14/2019 1134  Last data filed at 8/14/2019 1030  Gross per 24 hour   Intake 720 ml   Output 6800 ml   Net -6080 ml     Current Facility-Administered Medications   Medication Dose Route Frequency    metroNIDAZOLE (FLAGYL) IVPB premix 500 mg  500 mg IntraVENous Q12H    furosemide (LASIX) injection 60 mg  60 mg IntraVENous Q12H    sodium chloride (NS) flush 5-40 mL  5-40 mL IntraVENous Q8H    sodium chloride (NS) flush 5-40 mL  5-40 mL IntraVENous PRN    acetaminophen (TYLENOL) tablet 650 mg  650 mg Oral Q4H PRN    HYDROcodone-acetaminophen (NORCO) 5-325 mg per tablet 1 Tab  1 Tab Oral Q4H PRN    morphine injection 2 mg  2 mg IntraVENous Q4H PRN    naloxone (NARCAN) injection 0.4 mg  0.4 mg IntraVENous PRN    diphenhydrAMINE (BENADRYL) capsule 25 mg  25 mg Oral Q4H PRN    ondansetron (ZOFRAN) injection 4 mg  4 mg IntraVENous Q4H PRN    bisacodyl (DULCOLAX) tablet 5 mg  5 mg Oral DAILY PRN    cefepime (MAXIPIME) 2 g in 0.9% sodium chloride (MBP/ADV) 100 mL  2 g IntraVENous Q12H    insulin lispro (HUMALOG) injection   SubCUTAneous AC&HS    amiodarone (CORDARONE) tablet 200 mg  200 mg Oral DAILY    apixaban (ELIQUIS) tablet 5 mg  5 mg Oral BID    loratadine (CLARITIN) tablet 10 mg  10 mg Oral DAILY    metoprolol succinate (TOPROL-XL) XL tablet 100 mg  100 mg Oral DAILY    pravastatin (PRAVACHOL) tablet 40 mg  40 mg Oral QHS    sacubitril-valsartan (ENTRESTO) 24-26 mg tablet 1 Tab  1 Tab Oral BID    spironolactone (ALDACTONE) tablet 12.5 mg  12.5 mg Oral DAILY    vancomycin (VANCOCIN) 1500 mg in  ml infusion  1,500 mg IntraVENous Q18H           Assessment/Plan:     81-year-old male with prior history of heart failure with reduced ejection fraction nonischemic cardiomyopathy R 2018 with nonobstructive coronary artery disease history of complete heart heart block status post [place] biventricular ICD history of atrial fibrillation on amiodarone therapy. Admitted with concern for scrotal cellulitis    1. Cellulitis on antibiotics  2. Hypertension  3. Chronic systolic heart failure receiving IV antibiotics with considerable volume. Urine output 4 L overnight renal function stable today edema improved change Lasix to 40 mg IV daily on appropriate therapies  4.  Paroxysmal atrial fibrillation on anticoagulation and amiodarone      Juan David Sepulveda MD  8/14/2019 11:34 AM

## 2019-08-14 NOTE — PROGRESS NOTES
Pt resting in bed. RR even/unlabored. NAD noted. Tele-- junctional with PVCs. Call light within reach. Safety measures in place. Encouraged to call PRN assist. Preparing to give report to oncoming RN.

## 2019-08-15 LAB
ANION GAP SERPL CALC-SCNC: 5 MMOL/L (ref 7–16)
BUN SERPL-MCNC: 18 MG/DL (ref 8–23)
CALCIUM SERPL-MCNC: 7.8 MG/DL (ref 8.3–10.4)
CHLORIDE SERPL-SCNC: 106 MMOL/L (ref 98–107)
CO2 SERPL-SCNC: 31 MMOL/L (ref 21–32)
CREAT SERPL-MCNC: 1.06 MG/DL (ref 0.8–1.5)
GLUCOSE BLD STRIP.AUTO-MCNC: 106 MG/DL (ref 65–100)
GLUCOSE BLD STRIP.AUTO-MCNC: 133 MG/DL (ref 65–100)
GLUCOSE BLD STRIP.AUTO-MCNC: 140 MG/DL (ref 65–100)
GLUCOSE BLD STRIP.AUTO-MCNC: 175 MG/DL (ref 65–100)
GLUCOSE SERPL-MCNC: 134 MG/DL (ref 65–100)
MAGNESIUM SERPL-MCNC: 1.9 MG/DL (ref 1.8–2.4)
POTASSIUM SERPL-SCNC: 3.7 MMOL/L (ref 3.5–5.1)
SODIUM SERPL-SCNC: 142 MMOL/L (ref 136–145)
VANCOMYCIN TROUGH SERPL-MCNC: 18.6 UG/ML (ref 5–20)

## 2019-08-15 PROCEDURE — 74011250636 HC RX REV CODE- 250/636: Performed by: INTERNAL MEDICINE

## 2019-08-15 PROCEDURE — 65270000029 HC RM PRIVATE

## 2019-08-15 PROCEDURE — 80048 BASIC METABOLIC PNL TOTAL CA: CPT

## 2019-08-15 PROCEDURE — 83735 ASSAY OF MAGNESIUM: CPT

## 2019-08-15 PROCEDURE — 74011250637 HC RX REV CODE- 250/637: Performed by: INTERNAL MEDICINE

## 2019-08-15 PROCEDURE — 74011250637 HC RX REV CODE- 250/637: Performed by: HOSPITALIST

## 2019-08-15 PROCEDURE — 74011000258 HC RX REV CODE- 258: Performed by: HOSPITALIST

## 2019-08-15 PROCEDURE — 74011250636 HC RX REV CODE- 250/636: Performed by: HOSPITALIST

## 2019-08-15 PROCEDURE — 36415 COLL VENOUS BLD VENIPUNCTURE: CPT

## 2019-08-15 PROCEDURE — 74011636637 HC RX REV CODE- 636/637: Performed by: HOSPITALIST

## 2019-08-15 PROCEDURE — 80202 ASSAY OF VANCOMYCIN: CPT

## 2019-08-15 PROCEDURE — 82962 GLUCOSE BLOOD TEST: CPT

## 2019-08-15 RX ORDER — CEFPODOXIME PROXETIL 200 MG/1
200 TABLET, FILM COATED ORAL 2 TIMES DAILY
Status: DISCONTINUED | OUTPATIENT
Start: 2019-08-15 | End: 2019-08-16 | Stop reason: HOSPADM

## 2019-08-15 RX ORDER — METRONIDAZOLE 500 MG/1
500 TABLET ORAL 2 TIMES DAILY
Status: DISCONTINUED | OUTPATIENT
Start: 2019-08-15 | End: 2019-08-16 | Stop reason: HOSPADM

## 2019-08-15 RX ADMIN — AMIODARONE HYDROCHLORIDE 200 MG: 200 TABLET ORAL at 09:43

## 2019-08-15 RX ADMIN — PRAVASTATIN SODIUM 40 MG: 20 TABLET ORAL at 21:48

## 2019-08-15 RX ADMIN — Medication 10 ML: at 21:48

## 2019-08-15 RX ADMIN — FUROSEMIDE 40 MG: 10 INJECTION, SOLUTION INTRAMUSCULAR; INTRAVENOUS at 09:44

## 2019-08-15 RX ADMIN — METRONIDAZOLE 500 MG: 500 INJECTION, SOLUTION INTRAVENOUS at 05:35

## 2019-08-15 RX ADMIN — APIXABAN 5 MG: 2.5 TABLET, FILM COATED ORAL at 17:04

## 2019-08-15 RX ADMIN — SPIRONOLACTONE 12.5 MG: 25 TABLET ORAL at 11:27

## 2019-08-15 RX ADMIN — METOPROLOL SUCCINATE 100 MG: 50 TABLET, EXTENDED RELEASE ORAL at 11:27

## 2019-08-15 RX ADMIN — VANCOMYCIN HYDROCHLORIDE 1500 MG: 10 INJECTION, POWDER, LYOPHILIZED, FOR SOLUTION INTRAVENOUS at 01:33

## 2019-08-15 RX ADMIN — SACUBITRIL AND VALSARTAN 1 TABLET: 49; 51 TABLET, FILM COATED ORAL at 21:48

## 2019-08-15 RX ADMIN — APIXABAN 5 MG: 2.5 TABLET, FILM COATED ORAL at 09:43

## 2019-08-15 RX ADMIN — Medication 10 ML: at 05:33

## 2019-08-15 RX ADMIN — METRONIDAZOLE 500 MG: 500 TABLET, FILM COATED ORAL at 17:04

## 2019-08-15 RX ADMIN — CEFPODOXIME PROXETIL 200 MG: 200 TABLET, FILM COATED ORAL at 17:04

## 2019-08-15 RX ADMIN — SACUBITRIL AND VALSARTAN 1 TABLET: 24; 26 TABLET, FILM COATED ORAL at 09:44

## 2019-08-15 RX ADMIN — Medication 10 ML: at 13:44

## 2019-08-15 RX ADMIN — INSULIN LISPRO 2 UNITS: 100 INJECTION, SOLUTION INTRAVENOUS; SUBCUTANEOUS at 11:27

## 2019-08-15 RX ADMIN — CEFEPIME 2 G: 2 INJECTION, POWDER, FOR SOLUTION INTRAVENOUS at 04:54

## 2019-08-15 RX ADMIN — LORATADINE 10 MG: 10 TABLET ORAL at 09:44

## 2019-08-15 NOTE — ROUTINE PROCESS
CHF teaching completed, verbalize emphasis on monitoring self and report to MD: 
· If you gain 2 lbs in one day or 5 lbs in a week, and short of breath. · If you can not lay flat without developing short of breath or rapid breathing at night; or if it wakes you up. Develop a cough or wheezing. · If you notice swollen hands/feet/ankles or stomach with a bloated/ full feeling. · If you are  more confused or mentally fuzzy or dizzy. · If you notice a rapid or change in your heart rate. · If you become more exhausted all the time and unable to do the same level of activity without stopping to catch your breath. Drink no more than 8 cups a day in 8 oz. cups. Limit Cola Drinks. Your Heart can not handle any more. Stay away from salt (limit anything with salt or sodium in it). Limit to 250mg per serving. Exercise needs to be started with your Doctors approval. 
Reduce stress; Call myself or Provider if assistance is needed. , will make self available post DC ,if an questions arise. Diabetic teaching completed. Planner @ BS:  60 mins total 
  
 
CC appt:

## 2019-08-15 NOTE — PROGRESS NOTES
Hospitalist Progress Note    Patient: Esdras Martines MRN: [id number]  SSN: [id number]    YOB: 1950  Age: 77 y.o. Sex: male      Admit Date: 8/12/2019    LOS: 3 days     Subjective:     77year old CM with a PMH of CHF with cor pulmonale, PAF, DM2, and HTN admitted for scrotal/perineal cellulitis and anasarca. 8/15 - He feels better today. Swelling improving. Scrotal pain resolved. Denies F/C/N/V. Review of systems negative except stated above. Objective:     Visit Vitals  /72   Pulse 80   Temp 98.2 °F (36.8 °C)   Resp 19   Wt 102.7 kg (226 lb 6.4 oz)   SpO2 91%   BMI 34.94 kg/m²      Oxygen Therapy  O2 Sat (%): 91 % (08/15/19 0725)  O2 Device: Room air (08/15/19 0752)      Intake and Output:     Intake/Output Summary (Last 24 hours) at 8/15/2019 0911  Last data filed at 8/15/2019 0853  Gross per 24 hour   Intake 2640 ml   Output 5875 ml   Net -3235 ml         Physical Exam:   GENERAL: alert, cooperative, no distress, appears stated age  EYE: conjunctivae/corneas clear. PERRL. THROAT & NECK: normal and no erythema or exudates noted. LUNG: clear to auscultation bilaterally  HEART: regular rate and rhythm, S1S2, no murmur, no JVD  ABDOMEN: soft, non-tender, non-distended. Bowel sounds normal.  GI: Scrotum with edema  EXTREMITIES:  1+ BLE edema, 2+ pedal/radial pulses bilaterally  SKIN: no rash or abnormalities  NEUROLOGIC: A&Ox3. Cranial nerves 2-12 grossly intact.     Lab/Data Review:  Recent Results (from the past 24 hour(s))   GLUCOSE, POC    Collection Time: 08/14/19 11:16 AM   Result Value Ref Range    Glucose (POC) 158 (H) 65 - 100 mg/dL   GLUCOSE, POC    Collection Time: 08/14/19  4:21 PM   Result Value Ref Range    Glucose (POC) 106 (H) 65 - 100 mg/dL   GLUCOSE, POC    Collection Time: 08/14/19  7:57 PM   Result Value Ref Range    Glucose (POC) 138 (H) 65 - 542 mg/dL   METABOLIC PANEL, BASIC    Collection Time: 08/15/19 12:39 AM   Result Value Ref Range    Sodium 142 136 - 145 mmol/L    Potassium 3.7 3.5 - 5.1 mmol/L    Chloride 106 98 - 107 mmol/L    CO2 31 21 - 32 mmol/L    Anion gap 5 (L) 7 - 16 mmol/L    Glucose 134 (H) 65 - 100 mg/dL    BUN 18 8 - 23 MG/DL    Creatinine 1.06 0.8 - 1.5 MG/DL    GFR est AA >60 >60 ml/min/1.73m2    GFR est non-AA >60 >60 ml/min/1.73m2    Calcium 7.8 (L) 8.3 - 10.4 MG/DL   MAGNESIUM    Collection Time: 08/15/19 12:39 AM   Result Value Ref Range    Magnesium 1.9 1.8 - 2.4 mg/dL   Truett Spearing    Collection Time: 08/15/19 12:39 AM   Result Value Ref Range    Vancomycin,trough 18.6 5 - 20 ug/mL   GLUCOSE, POC    Collection Time: 08/15/19  5:45 AM   Result Value Ref Range    Glucose (POC) 106 (H) 65 - 100 mg/dL       Imaging:  Xr Chest Sngl V    Result Date: 8/12/2019  History: Shortness of breath Exam: portable chest Comparison: None Findings: No focal alveolar infiltrate or pleural effusion. There is a dual-lead pacemaker overlying the left chest wall. No pneumothorax. Impressions: No acute findings. Us Scrotum/testicles    Result Date: 8/12/2019  EXAM: Ultrasound of the testicles. INDICATION: Pain and erythema. COMPARISON: None. TECHNIQUE: A standard protocol scrotal ultrasound was performed. FINDINGS: Both testicles have a normal ultrasound appearance and demonstrate internal vascularity. The right testicle measures 2.5 x 3.4 x 2.6 cm and the left testicle measures 2.4 x 3.4 x 2.6 cm. The epididymides are unremarkable except for a 5 mm cyst on the left. There are small bilateral hydroceles. The scrotal skin is thickened and edematous. IMPRESSION: 1. Thickened and edematous scrotal skin. 2. Normal bilateral testicles. 3. Small bilateral hydroceles. 4. 5 mm left epididymal cyst.     Ct Urogram Wo Cont    Result Date: 8/13/2019  CT ABDOMEN AND PELVIS WITHOUT CONTRAST 8/13/2019 8:33 AM History:  Scrotal cellulitis. Scrotal pain. Left flank pain. Admission H and P note:Urology c/s. Appreciate recs.  No signs of Peter's gangrene clinically. Will obtain U/S scrotum, CT abdomen and pelvis will also be obtained, Technique: Noncontrast  axial images were obtained through the abdomen and pelvis per the renal stone protocol. Coronal reformatted images were generated. Dose reduction techniques were used such as automated exposure control, adjustment of the MA or KV according to patient size, and iterative reconstruction. Comparison:  January 20, 2012 Findings: Included portions of the lung bases demonstrate a right pleural effusion. ABDOMEN:    There are no renal or ureteral calculi. There is no hydronephrosis. Evaluation of the abdominal viscera is limited without IV contrast.  Trace perihepatic ascites is present. The unenhanced appearance of the gallbladder, liver, pancreas, spleen and adrenal glands is normal. The appendix is not well-demonstrated. There is no inflammation in the right lower quadrant. PELVIS: Scattered left periaortic retroperitoneal lymph nodes are present with a representative node just distal to the aortic bifurcation measuring 11 mm short axis (image 45). These have improved compared to the prior CT. The bladder is normal appearance. There is a small amount of free pelvic fluid. Bone windows demonstrated no aggressive osseous lesions. There is diffuse edema throughout the subcutaneous fat of the abdomen and pelvis. IMPRESSION: 1. No renal or ureteral calculi. No hydronephrosis. 2. Improved retroperitoneal adenopathy. 3. Suspected anasarca. No results found for this visit on 08/12/19. Cultures:   All Micro Results     None          Assessment/Plan:     Principal Problem:    Cellulitis of scrotum (8/12/2019)   - Improving  - Swelling improved  - Change Cefepime + Flagyl to PO Vantin and Flagyl  - Urology following    Active Problems:    Acute on chronic systolic CHF (congestive heart failure) ([facility]) (8/12/2019)  - Improving  - Continue IV Lasix  - Strict 1/0 - 6.6L UOP x 24 hours  - Appreciate Cardiology's input and assistance      Mat (8/13/2019)  - Due to cor pulmonale  - Improving  - Continue IV Lasix  - Strict 1/0 - 4L UOP x 24 hours      Cardiomyopathy ([facility] [address] (8/13/2019)      Cor pulmonale (chronic) ([facility]) (8/13/2019)  - Continue IV Lasix  - Strict I/O      HTN (hypertension) (7/22/2011)  - Stable  - Continue Entresto  - Continue Spironolactone  - Contineu Toprol      Paroxysmal atrial fibrillation (8/13/2019)  - No acute issues  - Continue Toprol  - Continue Amiodarone  - Continue Eliquis      Diabetes mellitus type 2, controlled ([facility] [address] (12/5/2011)  - No acute issues  - Continue Humalog SSI  - Holding Metformin    Dispo - Continue Lasix. Change to PO antibiotics. Discharge home in next 1-2 days.     DIET DIABETIC CONSISTENT CARB Regular; 2 GM NA (House Low NA); FR 1500ML    DVT Prophylaxis: Eliquis      Signed By: Tamica Bradshaw DO     August 15, 2019

## 2019-08-15 NOTE — PROGRESS NOTES
Bedside shift report completed with oncoming nurse, Karen Alva. Patient resting quietly in bed at this time, awake, respirations even and unlabored on room air. Denies needs at this time. Bed low and locked. Bedside table, personal belongings and call light within reach.

## 2019-08-15 NOTE — PROGRESS NOTES
PT note: Attempted PT treatment this afternoon however pt politely declined. Pt states he is feeling better and getting close to his baseline. Pt states he has been walking independently around room and getting up to chair. No discharge needs anticipated. Will discharge from PT services at this time.      Thanks,  Mulu Ely, DPT

## 2019-08-16 VITALS
TEMPERATURE: 98.1 F | HEART RATE: 81 BPM | DIASTOLIC BLOOD PRESSURE: 63 MMHG | BODY MASS INDEX: 31.87 KG/M2 | SYSTOLIC BLOOD PRESSURE: 108 MMHG | OXYGEN SATURATION: 91 % | WEIGHT: 206.5 LBS | RESPIRATION RATE: 18 BRPM

## 2019-08-16 LAB
GLUCOSE BLD STRIP.AUTO-MCNC: 118 MG/DL (ref 65–100)
GLUCOSE BLD STRIP.AUTO-MCNC: 84 MG/DL (ref 65–100)
MAGNESIUM SERPL-MCNC: 2 MG/DL (ref 1.8–2.4)

## 2019-08-16 PROCEDURE — 74011250637 HC RX REV CODE- 250/637: Performed by: INTERNAL MEDICINE

## 2019-08-16 PROCEDURE — 74011250636 HC RX REV CODE- 250/636: Performed by: INTERNAL MEDICINE

## 2019-08-16 PROCEDURE — 82962 GLUCOSE BLOOD TEST: CPT

## 2019-08-16 PROCEDURE — 77030012935 HC DRSG AQUACEL BMS -B

## 2019-08-16 PROCEDURE — 36415 COLL VENOUS BLD VENIPUNCTURE: CPT

## 2019-08-16 PROCEDURE — 74011250637 HC RX REV CODE- 250/637: Performed by: HOSPITALIST

## 2019-08-16 PROCEDURE — 83735 ASSAY OF MAGNESIUM: CPT

## 2019-08-16 RX ORDER — CEFPODOXIME PROXETIL 200 MG/1
200 TABLET, FILM COATED ORAL 2 TIMES DAILY
Qty: 22 TAB | Refills: 0 | Status: SHIPPED | OUTPATIENT
Start: 2019-08-16 | End: 2019-08-27

## 2019-08-16 RX ORDER — METRONIDAZOLE 500 MG/1
500 TABLET ORAL 2 TIMES DAILY
Qty: 12 TAB | Refills: 0 | Status: SHIPPED | OUTPATIENT
Start: 2019-08-16 | End: 2019-08-22

## 2019-08-16 RX ORDER — BUMETANIDE 1 MG/1
4 TABLET ORAL DAILY
Status: DISCONTINUED | OUTPATIENT
Start: 2019-08-17 | End: 2019-08-16 | Stop reason: HOSPADM

## 2019-08-16 RX ADMIN — CEFPODOXIME PROXETIL 200 MG: 200 TABLET, FILM COATED ORAL at 08:25

## 2019-08-16 RX ADMIN — METRONIDAZOLE 500 MG: 500 TABLET, FILM COATED ORAL at 08:25

## 2019-08-16 RX ADMIN — FUROSEMIDE 40 MG: 10 INJECTION, SOLUTION INTRAMUSCULAR; INTRAVENOUS at 08:25

## 2019-08-16 RX ADMIN — AMIODARONE HYDROCHLORIDE 200 MG: 200 TABLET ORAL at 08:25

## 2019-08-16 RX ADMIN — LORATADINE 10 MG: 10 TABLET ORAL at 08:25

## 2019-08-16 RX ADMIN — SACUBITRIL AND VALSARTAN 1 TABLET: 49; 51 TABLET, FILM COATED ORAL at 08:25

## 2019-08-16 RX ADMIN — APIXABAN 5 MG: 2.5 TABLET, FILM COATED ORAL at 08:25

## 2019-08-16 RX ADMIN — Medication 10 ML: at 05:46

## 2019-08-16 RX ADMIN — SPIRONOLACTONE 12.5 MG: 25 TABLET ORAL at 09:53

## 2019-08-16 RX ADMIN — METOPROLOL SUCCINATE 100 MG: 50 TABLET, EXTENDED RELEASE ORAL at 09:53

## 2019-08-16 NOTE — PROGRESS NOTES
Problem: Cellulitis Care Plan (Adult)  Goal: *Control of acute pain  Outcome: Progressing Towards Goal     Problem: Cellulitis Care Plan (Adult)  Goal: *Skin integrity maintained  Outcome: Progressing Towards Goal     Problem: Cellulitis Care Plan (Adult)  Goal: *Absence of infection signs and symptoms  Outcome: Progressing Towards Goal

## 2019-08-16 NOTE — PROGRESS NOTES
Patient is discharging home today. He remains independent with no discharge planning needs noted. Case Management will remain available to assist as needed. Care Management Interventions  PCP Verified by CM:  Yes  Transition of Care Consult (CM Consult): Discharge Planning  Discharge Durable Medical Equipment: No  Physical Therapy Consult: Yes  Occupational Therapy Consult: Yes  Speech Therapy Consult: No  Current Support Network: Own Home  Confirm Follow Up Transport: Family  Plan discussed with Pt/Family/Caregiver: Yes  Freedom of Choice Offered: Yes  Discharge Location  Discharge Placement: Home

## 2019-08-16 NOTE — PROGRESS NOTES
Patient resting in bed, alert and oriented, patient on RA, denies pain or distress, safety measures in place, call light within reach.

## 2019-08-16 NOTE — PROGRESS NOTES
[facility] CARDIOLOGY PROGRESS NOTE      8/16/2019 11:07 AM    Admit Date: 8/12/2019    Admit Diagnosis: Acute on chronic systolic CHF (congestive heart failure) ([facility]) [I50.23]      Subjective:   feeling much better       Objective:      Vitals:    08/15/19 2258 08/16/19 0306 08/16/19 0715 08/16/19 0952   BP: 116/78 120/80 112/69 103/67   Pulse: 79 79 86 81   Resp: 18 16 16    Temp: 98.2 °F (36.8 °C) 98.3 °F (36.8 °C) 98.1 °F (36.7 °C)    SpO2: 93% 91% 91% 92%   Weight:  206 lb 8 oz (93.7 kg)         Physical Exam:  Neck-   CV- rr+r   Lungs- clear  Ext-  much improved   Skin- warm and dry        Data Review:   Recent Labs     08/16/19  0358 08/15/19  0039 08/14/19  0411   NA  --  142 142   K  --  3.7 3.6   MG 2.0 1.9 1.9   BUN  --  18 16   CREA  --  1.06 1.19   GLU  --  134* 117*       Assessment and Plan:     Principal Problem:    Cellulitis of scrotum (8/12/2019)    Active Problems:    HTN (hypertension) (7/22/2011)      Diabetes mellitus type 2, controlled ([address] (12/5/2011)      Acute on chronic systolic CHF (congestive heart failure) ([address] (8/12/2019)  marked improvement ok for d/c on home meds       emphasized daily weights         Cardiomyopathy ([address] (8/13/2019)      Anasarca (8/13/2019)  improved  d/c iv lasix ok for d/c  and early f/u with  [facility]      Cor pulmonale (chronic) ([address] (8/13/2019)      Paroxysmal atrial fibrillation ([address] (8/13/2019)          Samson Cortes MD

## 2019-08-16 NOTE — DISCHARGE SUMMARY
Hospitalist Discharge Summary     Patient ID:  Pavan López  [id number]  89 y.o.  1950  Admit date: 8/12/2019 12:19 PM  Discharge date and time: 8/16/2019  Attending: Genevieve Charles DO  PCP:  Gavi Escobedo MD  Treatment Team: Attending Provider: Genevieve Charles DO; Consulting Provider: Camilo Ceja MD; Care Manager: Ame Lyman RN; Utilization Review: Dominique Dobbs; Occupational Therapist: SHAHRAM Patino/L; Primary Nurse: Consuelo Corbett    Principal Diagnosis Cellulitis of scrotum    Hospital Problems as of 8/16/2019 Date Reviewed: 5/12/2015          Codes Class Noted - Resolved POA    Acute on chronic systolic CHF (congestive heart failure) ([facility] ICD-10-CM: I50.23  ICD-9-CM: 428.23, 428.0  8/12/2019 - Present Yes        * (Principal) Cellulitis of scrotum ICD-10-CM: N49.2  ICD-9-CM: 608.4  8/12/2019 - Present Yes        Anasarca ICD-10-CM: R60.1  ICD-9-CM: 782.3  8/13/2019 - Present Yes        Cardiomyopathy ([facility] ICD-10-CM: I42.9  ICD-9-CM: 425.4  8/13/2019 - Present Yes        Cor pulmonale (chronic) ([facility] ICD-10-CM: I27.81  ICD-9-CM: 416.9  8/13/2019 - Present Yes        Paroxysmal atrial fibrillation ([facility] ICD-10-CM: I48.0  ICD-9-CM: 427.31  8/13/2019 - Present Yes        HTN (hypertension) ICD-10-CM: I10  ICD-9-CM: 401.9  7/22/2011 - Present Yes        Diabetes mellitus type 2, controlled ([facility] ICD-10-CM: E11.9  ICD-9-CM: 250.00  12/5/2011 - Present Yes        Diabetes ([facility] (Chronic) ICD-10-CM: E11.9  ICD-9-CM: 250.00  7/22/2011 - Present               Hospital Course:  77year old CM with a PMH of CHF with cor pulmonale, PAF, DM2, and HTN admitted for scrotal/perineal cellulitis and anasarca. He was started on IV Lasix. Cardiology was consulted. Urology was consulted. He was started on Ceftriaxone and Flagyl. His swelling improved and his scrotal/penial erythema improved.  He put out almost 20L of urine over 4 days and his swelling was much improved. He was discharged home in stable condition to complete a course of antibiotics. Significant Diagnostic Studies:    Labs: Results:       Chemistry Recent Labs     08/15/19  0039 08/14/19  0411   * 117*    142   K 3.7 3.6    109*   CO2 31 26   BUN 18 16   CREA 1.06 1.19   CA 7.8* 7.7*   AGAP 5* 7      CBC w/Diff No results for input(s): WBC, RBC, HGB, HCT, PLT, GRANS, LYMPH, EOS, HGBEXT, HCTEXT, PLTEXT in the last 72 hours. Cardiac Enzymes No results for input(s): CPK, CKND1, REBEKA in the last 72 hours. No lab exists for component: CKRMB, TROIP   Coagulation No results for input(s): PTP, INR, APTT in the last 72 hours. No lab exists for component: INREXT    Lipid Panel No results found for: CHOL, CHOLPOCT, CHOLX, CHLST, CHOLV, 640800, HDL, LDL, LDLC, DLDLP, 565708, VLDLC, VLDL, TGLX, TRIGL, TRIGP, TGLPOCT, CHHD, CHHDX   BNP No results for input(s): BNPP in the last 72 hours. Liver Enzymes No results for input(s): TP, ALB, TBIL, AP, SGOT, GPT in the last 72 hours. No lab exists for component: DBIL   Thyroid Studies No results found for: T4, T3U, TSH, TSHEXT         Imaging:  Us Scrotum/testicles    Result Date: 8/12/2019  IMPRESSION: 1. Thickened and edematous scrotal skin. 2. Normal bilateral testicles. 3. Small bilateral hydroceles. 4. 5 mm left epididymal cyst.     Ct Urogram Wo Cont    Result Date: 8/13/2019  IMPRESSION: 1. No renal or ureteral calculi. No hydronephrosis. 2. Improved retroperitoneal adenopathy. 3. Suspected anasarca. Microbiology/Cultures:   All Micro Results     None          Discharge Exam:  Visit Vitals  /63 (BP 1 Location: Left arm, BP Patient Position: At rest)   Pulse 81   Temp 98.1 °F (36.7 °C)   Resp 18   Wt 93.7 kg (206 lb 8 oz)   SpO2 91%   BMI 31.87 kg/m²     General appearance: alert, cooperative, no distress, appears stated age  Lungs: clear to auscultation bilaterally  Heart: regular rate and rhythm, S1, S2 normal, no murmur, click, rub or gallop  Abdomen: soft, non-tender. Bowel sounds normal. No masses,  no organomegaly  Extremities: Trace BLE edema  Neurologic: Grossly normal    Disposition: home  Discharge Condition: stable  Patient Instructions:   Current Discharge Medication List      START taking these medications    Details   cefpodoxime (VANTIN) 200 mg tablet Take 1 Tab by mouth two (2) times a day for 11 days. Qty: 22 Tab, Refills: 0      metroNIDAZOLE (FLAGYL) 500 mg tablet Take 1 Tab by mouth two (2) times a day for 6 days. Qty: 12 Tab, Refills: 0         CONTINUE these medications which have NOT CHANGED    Details   apixaban (ELIQUIS) 5 mg tablet Take 5 mg by mouth two (2) times a day. cetirizine (ZYRTEC) 10 mg tablet Take 10 mg by mouth daily. bumetanide (BUMEX) 2 mg tablet Take 4 mg by mouth daily. sacubitril-valsartan (ENTRESTO) 24 mg/26 mg tablet Take 1 Tab by mouth two (2) times a day. eplerenone (INSPRA) 25 mg tablet Take  by mouth daily. SITagliptin (JANUVIA) 100 mg tablet Take 100 mg by mouth daily. metoprolol succinate (TOPROL-XL) 100 mg tablet Take 100 mg by mouth daily. nystatin (NYSTOP) powder Apply  to affected area four (4) times daily. pravastatin (PRAVACHOL) 40 mg tablet Take 40 mg by mouth nightly. spironolactone (ALDACTONE) 25 mg tablet Take 12.5 mg by mouth daily. amiodarone (CORDARONE) 200 mg tablet Take 200 mg by mouth daily. metFORMIN (GLUCOPHAGE) 850 mg tablet Take 850 mg by mouth two (2) times daily (with meals). omega-3 fatty acids-vitamin e (FISH OIL) 1,000 mg Cap Take 1 Cap by mouth daily (after breakfast).  Las dose 2/3/12              Activity: Activity as tolerated  Diet: Cardiac Diet, 2 gram sodium  Wound Care: None needed    Follow-up  ·   Dr. Areli Bravo in one week  · [facility] in 1 month  Time spent to discharge patient 35 minutes  Signed:  Claudia Muir DO  8/16/2019  11:43 AM

## 2019-08-16 NOTE — PROGRESS NOTES
Patient discharged. Assisted by assistant Drea. No signs of distress. All belongings with patients. All lines removed. No further questions at this time.

## 2019-08-19 ENCOUNTER — PATIENT OUTREACH (OUTPATIENT)
Dept: CASE MANAGEMENT | Age: 69
End: 2019-08-19

## 2019-08-19 NOTE — PROGRESS NOTES
This note will not be viewable in [address] Date/Time of Call:   8/19/19 5:10 PM    What was the patient hospitalized for? Cellulitis of Scrotum   Does the patient understand his/her diagnosis and/or treatment and what happened during the hospitalization? Yes    Patient understands diagnosis and treatment. Did the patient receive discharge instructions? Yes   CM Assessed Risk for Readmission:       Patient stated Risk for Readmission:      Low to Medium risk for readmission. None at present, unless he has reoccurring issues with cellulitis. Review any discharge instructions (see discharge instructions/AVS in [facility]). Ask patient if they understand these. Do they have any questions? Yes, reviewed discharge instructions. No questions at this time. Were home services ordered (nursing, PT, OT, ST, etc.)? Yes, but patient has not heard from anyone. He states his PCP and Cardiologist are with [address] He sees his Cardiologist tomorrow. Asked patient to see if his Cardiologist can recommend PT/OT. Asked patient to call me and let me know and if need be I can get it set up for him. He verbalized understanding. If so, has the first visit occurred? If not, why? (Assist with coordination of services if necessary.)   No   Was any DME ordered? No   If so, has it been received? If not, why?  (Assist patient in obtaining DME orders &/or equipment if necessary.) N/A       Complete a review of all medications (new, continued and discontinued meds per the D/C instructions and medication tab in [facility]). Yes            Were all new prescriptions filled? If not, why?  (Assist patient in obtaining medications if necessary  escalate for CCM &/or SW if ongoing issues are verbalized by patient or anticipated)   No. patient states there was some issues with his insurance paying for the meds. The prescriptions are ready for  now.  Reiterated the importance of taking the ABXs as prescribed. He states he is going to  RXs tomorrow. Does the patient understand the purpose and dosing instructions for all medications? (If patient has questions, provide explanation and education.)   Yes   Does the patient have any problems in performing ADLs? (If patient is unable to perform ADLs  what is the limiting factor(s)? Do they have a support system that can assist? If no support system is present, discuss possible assistance that they may be able to obtain. Escalate for CCM/SW if ongoing issues are verbalized by pt or anticipated)   Activity as tolerated. Patients wife is there and can assist with ADLs. Does the patient have all follow-up appointments scheduled? 7 day f/up with PCP?   (f/up with PCP may be w/in 14 days if patient has a f/up with their specialist w/in 7 days)    7-14 day f/up with specialist?   (or per discharge instructions)    If f/up has not been made  what actions has the care coordinator made to accomplish this? Has transportation been arranged? Berto Sanchez NP, [facility] 8/20/19 @ 3 PM     Dr Tristen Ramon, 8/22/19 @ 4 PM           Reviewed appointments with patient        Yes, patient has transportation, family   Any other questions or concerns expressed by the patient? None at this time. Contact information for Care Coordinator was given, instructed to call with new questions or concerns. Schedule next appointment with EMORY Clifford or refer to RN Case Manager/ per the workflow guidelines. When is care coordinators next follow-up call scheduled? If referred for CCM  what RN care manager was the referral assigned? Care Coordinator will follow up per workflow guide lines.         7 to 14 days   [facility] Call Completed By:   Vu Fulton LPN Care Coordinator

## 2019-09-13 ENCOUNTER — PATIENT OUTREACH (OUTPATIENT)
Dept: CASE MANAGEMENT | Age: 69
End: 2019-09-13

## 2019-09-13 NOTE — PROGRESS NOTES
This note will not be viewable in [address] 2nd attempt to contact patient for [facility] call, no answer, 9/13/19. Will attempt 3rd call within 5 business days.

## 2020-02-17 ENCOUNTER — HOSPITAL ENCOUNTER (OUTPATIENT)
Age: 70
Setting detail: OUTPATIENT SURGERY
Discharge: HOME | End: 2020-02-17
Attending: INTERNAL MEDICINE | Admitting: INTERNAL MEDICINE
Payer: MEDICARE

## 2020-02-17 ENCOUNTER — ANESTHESIA EVENT (OUTPATIENT)
Dept: ENDOSCOPY | Age: 70
End: 2020-02-17
Payer: MEDICARE

## 2020-02-17 ENCOUNTER — ANESTHESIA (OUTPATIENT)
Dept: ENDOSCOPY | Age: 70
End: 2020-02-17
Payer: MEDICARE

## 2020-02-17 VITALS
HEART RATE: 81 BPM | OXYGEN SATURATION: 98 % | SYSTOLIC BLOOD PRESSURE: 127 MMHG | RESPIRATION RATE: 18 BRPM | TEMPERATURE: 98 F | DIASTOLIC BLOOD PRESSURE: 77 MMHG | BODY MASS INDEX: 30.78 KG/M2 | HEIGHT: 70 IN | WEIGHT: 215 LBS

## 2020-02-17 LAB — GLUCOSE BLD STRIP.AUTO-MCNC: 109 MG/DL (ref 65–100)

## 2020-02-17 PROCEDURE — 88305 TISSUE EXAM BY PATHOLOGIST: CPT

## 2020-02-17 PROCEDURE — 74011250636 HC RX REV CODE- 250/636: Performed by: INTERNAL MEDICINE

## 2020-02-17 PROCEDURE — 77030013991 HC SNR POLYP ENDOSC BSC -A: Performed by: INTERNAL MEDICINE

## 2020-02-17 PROCEDURE — 82962 GLUCOSE BLOOD TEST: CPT

## 2020-02-17 PROCEDURE — 74011250636 HC RX REV CODE- 250/636: Performed by: NURSE ANESTHETIST, CERTIFIED REGISTERED

## 2020-02-17 PROCEDURE — 76060000032 HC ANESTHESIA 0.5 TO 1 HR: Performed by: INTERNAL MEDICINE

## 2020-02-17 PROCEDURE — 74011000250 HC RX REV CODE- 250: Performed by: NURSE ANESTHETIST, CERTIFIED REGISTERED

## 2020-02-17 PROCEDURE — 77030021593 HC FCPS BIOP ENDOSC BSC -A: Performed by: INTERNAL MEDICINE

## 2020-02-17 PROCEDURE — 88312 SPECIAL STAINS GROUP 1: CPT

## 2020-02-17 PROCEDURE — 76040000026: Performed by: INTERNAL MEDICINE

## 2020-02-17 PROCEDURE — 77030022875 HC PRB AR PLSM COAG ERBE -C: Performed by: INTERNAL MEDICINE

## 2020-02-17 RX ORDER — PROPOFOL 10 MG/ML
INJECTION, EMULSION INTRAVENOUS AS NEEDED
Status: DISCONTINUED | OUTPATIENT
Start: 2020-02-17 | End: 2020-02-17 | Stop reason: HOSPADM

## 2020-02-17 RX ORDER — PROPOFOL 10 MG/ML
INJECTION, EMULSION INTRAVENOUS
Status: DISCONTINUED | OUTPATIENT
Start: 2020-02-17 | End: 2020-02-17 | Stop reason: HOSPADM

## 2020-02-17 RX ORDER — LIDOCAINE HYDROCHLORIDE 20 MG/ML
INJECTION, SOLUTION EPIDURAL; INFILTRATION; INTRACAUDAL; PERINEURAL AS NEEDED
Status: DISCONTINUED | OUTPATIENT
Start: 2020-02-17 | End: 2020-02-17 | Stop reason: HOSPADM

## 2020-02-17 RX ORDER — SODIUM CHLORIDE, SODIUM LACTATE, POTASSIUM CHLORIDE, CALCIUM CHLORIDE 600; 310; 30; 20 MG/100ML; MG/100ML; MG/100ML; MG/100ML
1000 INJECTION, SOLUTION INTRAVENOUS CONTINUOUS
Status: DISCONTINUED | OUTPATIENT
Start: 2020-02-17 | End: 2020-02-17 | Stop reason: HOSPADM

## 2020-02-17 RX ADMIN — SODIUM CHLORIDE, SODIUM LACTATE, POTASSIUM CHLORIDE, AND CALCIUM CHLORIDE 1000 ML: 600; 310; 30; 20 INJECTION, SOLUTION INTRAVENOUS at 13:06

## 2020-02-17 RX ADMIN — PROPOFOL 200 MCG/KG/MIN: 10 INJECTION, EMULSION INTRAVENOUS at 14:13

## 2020-02-17 RX ADMIN — PROPOFOL 50 MG: 10 INJECTION, EMULSION INTRAVENOUS at 14:13

## 2020-02-17 RX ADMIN — PHENYLEPHRINE HYDROCHLORIDE 100 MCG: 10 INJECTION INTRAVENOUS at 14:30

## 2020-02-17 RX ADMIN — LIDOCAINE HYDROCHLORIDE 100 MG: 20 INJECTION, SOLUTION EPIDURAL; INFILTRATION; INTRACAUDAL; PERINEURAL at 14:13

## 2020-02-17 RX ADMIN — PROPOFOL 30 MG: 10 INJECTION, EMULSION INTRAVENOUS at 14:16

## 2020-02-17 RX ADMIN — PHENYLEPHRINE HYDROCHLORIDE 100 MCG: 10 INJECTION INTRAVENOUS at 14:54

## 2020-02-17 NOTE — PROCEDURES
COLONOSCOPY    DATE of PROCEDURE: 2/17/2020    INDICATIONS:  1. Iron deficiency anemia     MEDICATION:  MAC      INSTRUMENT: PCF    PROCEDURE: After obtaining informed consent, the patient was placed in the left lateral position and sedated. The endoscope was advanced to the cecum where the appendiceal orifice and ileocecal valve were identified. On withdrawal, the colon was carefully visualized in a 360 degree fashion. Retroflexion was performed in the rectum. The patient was taken to the recovery area in stable condition. FINDINGS:  Anus: external hemorrhoids  Rectum: internal hemorrhoids. 4 mm polyp removed with snare cautery. Sigmoid: Diverticulosis present. 6 mm sessile polyp removed by snare cautery. Descending Colon: Diverticulosis present   Transverse Colon: 3 and 3 mm sessile polyps removed by cold biopsy polypectomy. Ascending Colon: 4 mm sessile polyp removed by cold snare polypectomy. Cecum: 5 mm sessile polyp removed by cold snare polypectomy. Terminal ileum: Normal     Estimated blood loss: 0-minimal     ASSESSMENT/PLAN:  1. Repeat colonoscopy 3 years  2.  Follow up with Dr. Diego Colvin in the office       Williams Craven MD  [facility] [place]

## 2020-02-17 NOTE — PROCEDURES
Esophagogastroduodenoscopy    DATE of PROCEDURE: 2/17/2020    INDICATIONS:  1. Iron deficiency anemia     MEDICATIONS ADMINISTERED: MAC    INSTRUMENT: GIF    PROCEDURE:  After obtaining informed consent, the patient was placed in the left lateral position and sedated. The endoscope was advanced under direct vision without difficulty. The esophagus, stomach (including retroflexed views) and duodenum were evaluated. The patient was taken to the recovery area in stable condition. FINDINGS:  ESOPHAGUS: Normal   STOMACH: 3 mm nodule just distal to the GE junction. Nodule is situated slightly posteriorly along the greater curvature. Nodule removed by cold biopsy. Erosions in the cardia and the antrum. Small, clean based ulcers in the antrum. Four random biopsies taken from the antrum, incisura and body of the stomach. Small AVM in the cardia treated with APC at [address] and 1.5 L/minute of flow. DUODENUM: Normal with normal views of the major papilla     Estimated blood loss: 0-minimal     PLAN:  1. B.i.d. PPI  2.  Avoid NSAIDs as able     Masha Garza MD  [facility] [place]

## 2020-02-17 NOTE — ANESTHESIA POSTPROCEDURE EVALUATION
Procedure(s):  ESOPHAGOGASTRODUODENOSCOPY (EGD)  COLONOSCOPY  ENDOSCOPIC ARGON PLASMA COAGULATION  ESOPHAGOGASTRODUODENAL (EGD) BIOPSY  ENDOSCOPIC POLYPECTOMY. total IV anesthesia    Anesthesia Post Evaluation      Multimodal analgesia: multimodal analgesia used between 6 hours prior to anesthesia start to PACU discharge  Patient location during evaluation: PACU  Patient participation: complete - patient participated  Level of consciousness: awake  Pain management: adequate  Airway patency: patent  Anesthetic complications: no  Cardiovascular status: acceptable and hemodynamically stable  Respiratory status: acceptable  Hydration status: acceptable  Comments: Acceptable for discharge from PACU. Post anesthesia nausea and vomiting:  none      Vitals Value Taken Time   /69 2/17/2020  3:15 PM   Temp 36.7 °C (98 °F) 2/17/2020  3:00 PM   Pulse 80 2/17/2020  3:16 PM   Resp 18 2/17/2020  3:15 PM   SpO2 96 % 2/17/2020  3:15 PM   Vitals shown include unvalidated device data.

## 2020-02-17 NOTE — H&P
[facility] H&P (Admission)        Date:  2/17/2020    Chief Complaint:  Iron-deficiency anemia     Subjective:     History of Present Illness:  Patient is a 77 y.o. being admitted for EGD and colonoscopy . PMH:  Past Medical History:   Diagnosis Date    Arthritis     OA-general/neck    Chronic pain     back    Diabetes ([address]     type 2 dx age 62-fastings avg 80; A1C-7.3 in July '11-today  no S/S hypo    Hypertension     controlled with medication    Obesity     Other ill-defined conditions(799.89)     3 collaspsed discs- L4, L5,S1    S/P Left total knee replacement using cement 12/5/2011    S/P total knee replacement using cement 7/20/2011    right knee    Unspecified adverse effect of anesthesia     reports unable to do spinal with right knee due to collapsed discs/ no problem with GA       PSH:  Past Surgical History:   Procedure Laterality Date   Rose Murillo  July 20,2011    replaced right TKA/ L TKA in Dec 2011       Allergies: Allergies   Allergen Reactions    Adhesive Other (comments)     Redness from tegaderm     Other Medication Other (comments)     Reports swelling and shakiness with insect bites    Pcn [Penicillins] Hives    Pollen Extracts Other (comments)     Runny nose, itchy eyes       Home Medications:  Prior to Admission medications    Medication Sig Start Date End Date Taking? Authorizing Provider   apixaban (ELIQUIS) 5 mg tablet Take 5 mg by mouth two (2) times a day. Yes Provider, Historical   cetirizine (ZYRTEC) 10 mg tablet Take 10 mg by mouth daily. Yes Provider, Historical   bumetanide (BUMEX) 2 mg tablet Take 4 mg by mouth daily. Yes Provider, Historical   sacubitril-valsartan (ENTRESTO) 24 mg/26 mg tablet Take 1 Tab by mouth two (2) times a day. Yes Provider, Historical   SITagliptin (JANUVIA) 100 mg tablet Take 100 mg by mouth daily. Yes Provider, Historical   metoprolol succinate (TOPROL-XL) 100 mg tablet Take 100 mg by mouth daily.    Yes Provider, Historical   nystatin (NYSTOP) powder Apply  to affected area four (4) times daily. Yes Provider, Historical   pravastatin (PRAVACHOL) 40 mg tablet Take 40 mg by mouth nightly. Yes Provider, Historical   spironolactone (ALDACTONE) 25 mg tablet Take 12.5 mg by mouth daily. Yes Provider, Historical   amiodarone (CORDARONE) 200 mg tablet Take 200 mg by mouth daily. Yes Provider, Historical   metFORMIN (GLUCOPHAGE) 850 mg tablet Take 850 mg by mouth two (2) times daily (with meals). Yes Provider, Historical   omega-3 fatty acids-vitamin e (FISH OIL) 1,000 mg Cap Take 1 Cap by mouth daily (after breakfast). Las dose 2/3/12    Yes Provider, Historical   eplerenone (INSPRA) 25 mg tablet Take  by mouth daily. Provider, Historical       Hospital Medications:  Current Facility-Administered Medications   Medication Dose Route Frequency    lactated Ringers infusion 1,000 mL  1,000 mL IntraVENous CONTINUOUS       Social History:  Social History     Tobacco Use    Smoking status: Never Smoker    Smokeless tobacco: Never Used   Substance Use Topics    Alcohol use: No         Family History:  Family History   Problem Relation Age of Onset    Diabetes Mother     Heart Disease Father     Diabetes Sister     Diabetes Maternal Aunt        Review of Systems:  A detailed 10 system ROS is obtained, with pertinent positives as listed above. All others are negative. Objective:     Physical Exam:  Vitals:  Visit Vitals  /86   Pulse 80   Temp 97.8 °F (36.6 °C)   Resp 16   Ht 5' 10\" (1.778 m)   Wt 97.5 kg (215 lb)   SpO2 98%   BMI 30.85 kg/m²     Gen:  Pt is alert, cooperative, no acute distress  Skin: no large lesions  HEENT: MMM  Cardiovascular: Regular rate and rhythm. No murmurs, gallops, or rubs. Respiratory:  Comfortable breathing with no accessory muscle use. Clear breath sounds with no wheezes, rales, or rhonchi. GI:  Abdomen nondistended, soft, and nontender.   Normal active bowel sounds. Neurological:  Gross memory appears intact. Patient is alert and oriented. Psychiatric:  Mood appears appropriate with judgement intact. Laboratory:    No results for input(s): WBC, HGB, HCT, PLT, MCV, NA, K, CL, CO2, BUN, CREA, CA, MG, GLU, AP, SGOT, GPT, TBIL, CBIL, ALB, TP, AML, LPSE, PTP, INR, APTT, HGBEXT, HCTEXT, PLTEXT, INREXT in the last 72 hours. No lab exists for component: DBIL    Assessment:       Active Problems:    * No active hospital problems. *      Plan:     Endoscopy and risks explained to the patient. Risks including reaction to sedation, cardiopulmonary events, infection, bleeding, perforation, requirement for surgery if complications should occur, death were explained to patient who expressed understanding and agreed to proceed with endoscopy.         Prerna Latham MD  [facility] [place]

## 2020-02-17 NOTE — ANESTHESIA PREPROCEDURE EVALUATION
Relevant Problems   No relevant active problems       Anesthetic History   No history of anesthetic complications            Review of Systems / Medical History  Patient summary reviewed and pertinent labs reviewed    Pulmonary        Sleep apnea: BiPAP           Neuro/Psych   Within defined limits           Cardiovascular    Hypertension      CHF (EF 25%): NYHA Classification III  Dysrhythmias : atrial fibrillation  Pacemaker    Exercise tolerance: <4 METS  Comments: Known mod PHTN.   Preserved RVSF   GI/Hepatic/Renal  Within defined limits              Endo/Other    Diabetes: well controlled, type 2    Obesity and arthritis     Other Findings              Physical Exam    Airway  Mallampati: III  TM Distance: 4 - 6 cm  Neck ROM: normal range of motion   Mouth opening: Normal     Cardiovascular    Rhythm: regular  Rate: normal         Dental         Pulmonary  Breath sounds clear to auscultation               Abdominal         Other Findings            Anesthetic Plan    ASA: 4  Anesthesia type: total IV anesthesia          Induction: Intravenous  Anesthetic plan and risks discussed with: Patient and Spouse

## 2020-02-17 NOTE — ANESTHESIA POSTPROCEDURE EVALUATION
Procedure(s): ESOPHAGOGASTRODUODENOSCOPY (EGD) COLONOSCOPY 
ENDOSCOPIC ARGON PLASMA COAGULATION 
ESOPHAGOGASTRODUODENAL (EGD) BIOPSY ENDOSCOPIC POLYPECTOMY. total IV anesthesia Anesthesia Post Evaluation Multimodal analgesia: multimodal analgesia used between 6 hours prior to anesthesia start to PACU discharge Patient location during evaluation: PACU Patient participation: complete - patient participated Level of consciousness: awake Pain management: adequate Airway patency: patent Anesthetic complications: no 
Cardiovascular status: acceptable and hemodynamically stable Respiratory status: acceptable Hydration status: acceptable Comments: Acceptable for discharge from PACU. Post anesthesia nausea and vomiting:  none No vitals data found for the desired time range.

## 2020-02-17 NOTE — ROUTINE PROCESS
VSS. Discharge instructions reviewed with patient and [place], son and copy of instructions sent home with patient. Dr. Em Barfield spoke with patient and son prior to discharge. Questions answered. Discharged via wheel chair, wheeled out by [facility], [address] [place] [address]. IV discontinued prior to discharge. Personal items with patient at discharge: clothing, glasses, and cane

## 2020-02-17 NOTE — DISCHARGE INSTRUCTIONS
Gastrointestinal Esophagogastroduodenoscopy (EGD))- Upper Exam Discharge Instructions    1. Call Dr. Shirin Ruiz  for any problems or questions. 2. Contact the doctor's office for follow up appointment as directed. 3. Medication may cause drowsiness for several hours, therefore, do not drive or operate machinery for remainder of the day. 4. No alcohol today. 5. Do not make any important decisions such as signing legal paperwork. 6. Ordinarily, you may resume regular diet and activity after exam unless otherwise specified by your physician. 7. For mild soreness in your throat you may use Cepacol throat lozenges or warm  salt-water gargles as needed. Gastrointestinal Colonoscopy- Lower Exam Discharge Instructions  1. Call Dr. Shirin Ruiz for any problems or questions. 2. Contact the doctors office for follow up appointment as directed  3. Medication may cause drowsiness for several hours, therefore, do not drive or operate machinery for remainder of the day. 4. No alcohol today. 5. Do not make any important decisions such signing legal paperwork. 6. Ordinarily, you may resume regular diet and activity after exam unless otherwise specified by your physician. 7. Because of air put into your colon during exam, you may experience some abdominal distension, relieved by the passage of gas, for several hours. 8. Contact your physician if you have any of the following:  a. Excessive amount of bleeding - large amount when having a bowel movement. Occasional specks of blood with bowel movement would not be unusual.  b. Severe abdominal pain  c. Fever or Chills  9. Polyp Removal - follow these additional instructions  a. Take Metamucil - 1 tablespoon in juice every morning for 3 days, if needed. b. No Aspirin, Advil, Aleve, Nuprin, Ibuprofen, or medications that contain these drugs for 2 weeks.   Any additional instructions:   Restart eliquis tomorrow   Repeat colonoscopy in 3 years  Start new prescription       Instructions given to Candelaria Carrion and other family members.

## 2021-01-16 ENCOUNTER — APPOINTMENT (OUTPATIENT)
Dept: GENERAL RADIOLOGY | Age: 71
End: 2021-01-16
Attending: STUDENT IN AN ORGANIZED HEALTH CARE EDUCATION/TRAINING PROGRAM
Payer: MEDICARE

## 2021-01-16 ENCOUNTER — HOSPITAL ENCOUNTER (EMERGENCY)
Age: 71
Discharge: HOME | End: 2021-01-16
Attending: STUDENT IN AN ORGANIZED HEALTH CARE EDUCATION/TRAINING PROGRAM
Payer: MEDICARE

## 2021-01-16 VITALS
WEIGHT: 210 LBS | RESPIRATION RATE: 16 BRPM | SYSTOLIC BLOOD PRESSURE: 122 MMHG | HEART RATE: 74 BPM | BODY MASS INDEX: 31.1 KG/M2 | OXYGEN SATURATION: 96 % | HEIGHT: 69 IN | DIASTOLIC BLOOD PRESSURE: 62 MMHG | TEMPERATURE: 98.2 F

## 2021-01-16 DIAGNOSIS — N17.9 AKI (ACUTE KIDNEY INJURY) (HCC): Primary | ICD-10-CM

## 2021-01-16 LAB
ALBUMIN SERPL-MCNC: 3.3 G/DL (ref 3.2–4.6)
ALBUMIN/GLOB SERPL: 0.8 {RATIO} (ref 1.2–3.5)
ALP SERPL-CCNC: 63 U/L (ref 50–136)
ALT SERPL-CCNC: 28 U/L (ref 12–65)
ANION GAP SERPL CALC-SCNC: 7 MMOL/L (ref 7–16)
AST SERPL-CCNC: 32 U/L (ref 15–37)
BASOPHILS # BLD: 0 K/UL (ref 0–0.2)
BASOPHILS NFR BLD: 0 % (ref 0–2)
BILIRUB SERPL-MCNC: 0.5 MG/DL (ref 0.2–1.1)
BUN SERPL-MCNC: 16 MG/DL (ref 8–23)
CALCIUM SERPL-MCNC: 8.3 MG/DL (ref 8.3–10.4)
CHLORIDE SERPL-SCNC: 110 MMOL/L (ref 98–107)
CO2 SERPL-SCNC: 25 MMOL/L (ref 21–32)
CREAT SERPL-MCNC: 1.49 MG/DL (ref 0.8–1.5)
DIFFERENTIAL METHOD BLD: ABNORMAL
EOSINOPHIL # BLD: 0.1 K/UL (ref 0–0.8)
EOSINOPHIL NFR BLD: 1 % (ref 0.5–7.8)
ERYTHROCYTE [DISTWIDTH] IN BLOOD BY AUTOMATED COUNT: 18.4 % (ref 11.9–14.6)
GLOBULIN SER CALC-MCNC: 4.2 G/DL (ref 2.3–3.5)
GLUCOSE SERPL-MCNC: 103 MG/DL (ref 65–100)
HCT VFR BLD AUTO: 45 % (ref 41.1–50.3)
HGB BLD-MCNC: 14.7 G/DL (ref 13.6–17.2)
IMM GRANULOCYTES # BLD AUTO: 0 K/UL (ref 0–0.5)
IMM GRANULOCYTES NFR BLD AUTO: 1 % (ref 0–5)
LYMPHOCYTES # BLD: 0.6 K/UL (ref 0.5–4.6)
LYMPHOCYTES NFR BLD: 10 % (ref 13–44)
MCH RBC QN AUTO: 27.1 PG (ref 26.1–32.9)
MCHC RBC AUTO-ENTMCNC: 32.7 G/DL (ref 31.4–35)
MCV RBC AUTO: 83 FL (ref 79.6–97.8)
MONOCYTES # BLD: 0.5 K/UL (ref 0.1–1.3)
MONOCYTES NFR BLD: 7 % (ref 4–12)
NEUTS SEG # BLD: 5 K/UL (ref 1.7–8.2)
NEUTS SEG NFR BLD: 81 % (ref 43–78)
NRBC # BLD: 0 K/UL (ref 0–0.2)
PLATELET # BLD AUTO: 180 K/UL (ref 150–450)
PMV BLD AUTO: 9.2 FL (ref 9.4–12.3)
POTASSIUM SERPL-SCNC: 4.1 MMOL/L (ref 3.5–5.1)
PROT SERPL-MCNC: 7.5 G/DL (ref 6.3–8.2)
RBC # BLD AUTO: 5.42 M/UL (ref 4.23–5.6)
SODIUM SERPL-SCNC: 142 MMOL/L (ref 136–145)
WBC # BLD AUTO: 6.2 K/UL (ref 4.3–11.1)

## 2021-01-16 PROCEDURE — 99284 EMERGENCY DEPT VISIT MOD MDM: CPT

## 2021-01-16 PROCEDURE — 74011250636 HC RX REV CODE- 250/636: Performed by: STUDENT IN AN ORGANIZED HEALTH CARE EDUCATION/TRAINING PROGRAM

## 2021-01-16 PROCEDURE — 87635 SARS-COV-2 COVID-19 AMP PRB: CPT

## 2021-01-16 PROCEDURE — 96360 HYDRATION IV INFUSION INIT: CPT

## 2021-01-16 PROCEDURE — 80053 COMPREHEN METABOLIC PANEL: CPT

## 2021-01-16 PROCEDURE — 74011250637 HC RX REV CODE- 250/637: Performed by: STUDENT IN AN ORGANIZED HEALTH CARE EDUCATION/TRAINING PROGRAM

## 2021-01-16 PROCEDURE — 71045 X-RAY EXAM CHEST 1 VIEW: CPT

## 2021-01-16 PROCEDURE — 85025 COMPLETE CBC W/AUTO DIFF WBC: CPT

## 2021-01-16 PROCEDURE — 96361 HYDRATE IV INFUSION ADD-ON: CPT

## 2021-01-16 RX ORDER — ACETAMINOPHEN 325 MG/1
650 TABLET ORAL
Status: COMPLETED | OUTPATIENT
Start: 2021-01-16 | End: 2021-01-16

## 2021-01-16 RX ORDER — ONDANSETRON 4 MG/1
4 TABLET, ORALLY DISINTEGRATING ORAL
Qty: 10 TAB | Refills: 0 | Status: SHIPPED | OUTPATIENT
Start: 2021-01-16

## 2021-01-16 RX ADMIN — SODIUM CHLORIDE 1000 ML: 900 INJECTION, SOLUTION INTRAVENOUS at 18:49

## 2021-01-16 RX ADMIN — ACETAMINOPHEN 650 MG: 325 TABLET, FILM COATED ORAL at 18:49

## 2021-01-16 NOTE — ED PROVIDER NOTES
Patient is a 80-year-old male presents to [facility] emergency department complaining of cough, nonproductive, nausea, no vomiting diarrhea. Reports his symptoms have been ongoing for 4 to 5 days. Reports decreased p.o. intake secondary to the nausea. Reports normal urination, denies dysuria. States his diarrhea is normal color, has been sporadic. Denies melena or hematochezia. Denies exposure to COVID-19. Denies significant shortness of breath. Denies chest pain, headache or fevers. Denies any abdominal pain. Patient also states he had a fall 4 days ago and has left-sided back pain since then. Denies shortness of breath. Denies hitting his head nor loss of consciousness. States it was a mechanical fall at home. Landing directly onto his back. Denies any muscle strength changes or sensory changes in lower extremities.            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    S/P total knee replacement using cement 7/20/2011    right knee    Unspecified adverse effect of anesthesia     reports unable to do spinal with right knee due to collapsed discs/ no problem with GA       Past Surgical History:   Procedure Laterality Date    COLONOSCOPY N/A 2/17/2020    COLONOSCOPY performed by Ila Argueta MD at [facility] ENDOSCOPY    HX ORTHOPAEDIC  July 20,2011    replaced right TKA/ L TKA in Dec 2011         Family History:   Problem Relation Age of Onset    Diabetes Mother     Heart Disease Father     Diabetes Sister     Diabetes Maternal Aunt        Social History     Socioeconomic History    Marital status:      Spouse name: Not on file    Number of children: Not on file    Years of education: Not on file    Highest education level: Not on file   Occupational History    Not on file   Social Needs    Financial resource strain: Not on file    Food insecurity     Worry: Not on file     Inability: Not on file    Transportation needs     Medical: Not on file     Non-medical: Not on file   Tobacco Use    Smoking status: Never Smoker    Smokeless tobacco: Never Used   Substance and Sexual Activity    Alcohol use: No    Drug use: Not on file    Sexual activity: Not on file   Lifestyle    Physical activity     Days per week: Not on file     Minutes per session: Not on file    Stress: Not on file   Relationships    Social connections     Talks on phone: Not on file     Gets together: Not on file     Attends Roman Catholic service: Not on file     Active member of club or organization: Not on file     Attends meetings of clubs or organizations: Not on file     Relationship status: Not on file    Intimate partner violence     Fear of current or ex partner: Not on file     Emotionally abused: Not on file     Physically abused: Not on file     Forced sexual activity: Not on file   Other Topics Concern    Not on file   Social History Narrative    Not on file         ALLERGIES: Adhesive, Other medication, Pcn [penicillins], and Pollen extracts    Review of Systems   Constitutional: Negative for chills and fever. HENT: Positive for congestion. Negative for rhinorrhea, sinus pressure and sore throat. Eyes: Negative for visual disturbance. Respiratory: Positive for cough. Negative for shortness of breath. Cardiovascular: Negative for chest pain. Gastrointestinal: Positive for nausea. Negative for abdominal pain, blood in stool, diarrhea and vomiting. Endocrine: Negative for polyuria. Genitourinary: Negative for difficulty urinating and dysuria. Musculoskeletal: Negative for arthralgias, neck pain and neck stiffness.    Skin: Negative for color change and rash. Neurological: Negative for syncope, speech difficulty, weakness, numbness and headaches. Psychiatric/Behavioral: Negative for behavioral problems, confusion and suicidal ideas. All other systems reviewed and are negative. Vitals:    01/16/21 1808   Pulse: 80   Resp: 16   Temp: 97.3 °F (36.3 °C)   SpO2: 97%   Weight: 95.3 kg (210 lb)   Height: 5' 9\" (1.753 m)            Physical Exam  Vitals signs and nursing note reviewed. Constitutional:       Appearance: Normal appearance. HENT:      Head: Normocephalic and atraumatic. Nose: Nose normal.      Mouth/Throat:      Mouth: Mucous membranes are dry. Eyes:      Extraocular Movements: Extraocular movements intact. Neck:      Musculoskeletal: Normal range of motion. Cardiovascular:      Rate and Rhythm: Normal rate and regular rhythm. Heart sounds: Normal heart sounds. Pulmonary:      Effort: Pulmonary effort is normal.      Breath sounds: Normal breath sounds. No wheezing, rhonchi or rales. Abdominal:      General: Abdomen is flat. Palpations: Abdomen is soft. Tenderness: There is no abdominal tenderness. There is no guarding. Musculoskeletal: Normal range of motion. Comments: Mild low thoracic left parathoracic muscle tenderness to palpation, no midline tenderness, no step-offs or deformities. No evidence of erythema, ecchymosis or trauma. Skin:     General: Skin is warm and dry. Neurological:      General: No focal deficit present. Mental Status: He is alert and oriented to person, place, and time. Psychiatric:         Mood and Affect: Mood normal.          MDM  Number of Diagnoses or Management Options  FORTINO (acute kidney injury) ([facility] [address]  Diagnosis management comments: Patient is a 69-year-old male complains of concern for COVID-19. States he has nausea, no vomiting, cough or congestion with intermittent body aches and left-sided mild back pain after mechanical fall.   Patient will be given 1 L bolus IV fluid. Will be given Tylenol for back pain. Labs are normal white count, stable H&H, Gross normal electrolytes, creatinine 1.49, GFR 50. Patient given 1 L bolus IV fluid. Chest x-ray shows no emergent findings. COVID-19 test pending. Patient advised to quarantine until the COVID-19 test is finalized. Follow-up with family medicine as needed. Return to the ER for worsening or worrisome symptoms. He voiced understanding and agreement with this plan.        Amount and/or Complexity of Data Reviewed  Clinical lab tests: ordered and reviewed  Tests in the radiology section of CPT®: ordered and reviewed    Patient Progress  Patient progress: improved         Procedures

## 2021-01-16 NOTE — ED TRIAGE NOTES
Pt arrrives via EMS from home. Chronic back pain. Temp 99.5 with n/v/d, sore throat, and fatigue. No known exposure to COVID. Did not get flu vaccine this year. Reports cough. Pt states he only had one episode of vomiting.

## 2021-01-17 NOTE — ED NOTES
I have reviewed discharge instructions with the patient. The patient verbalized understanding. Patient left ED via Discharge Method: ambulatory to Home with roundtrip. Opportunity for questions and clarification provided. Patient given 1 scripts. To continue your aftercare when you leave the hospital, you may receive an automated call from our care team to check in on how you are doing. This is a free service and part of our promise to provide the best care and service to meet your aftercare needs.  If you have questions, or wish to unsubscribe from this service please call [phone]. Thank you for Choosing our [facility] [employer] [facility] Emergency Department.

## 2021-01-17 NOTE — DISCHARGE INSTRUCTIONS
Continue to orally hydrate with clear liquids. Follow-up with primary care physician in 2 to 3 days for repeat kidney function testing. Return to the ER for worsening or worrisome symptoms.

## 2021-01-19 LAB
SARS COV-2, XPGCVT: POSITIVE
SOURCE, COVRS: ABNORMAL

## 2021-01-21 ENCOUNTER — HOSPITAL ENCOUNTER (INPATIENT)
Age: 71
LOS: 11 days | Discharge: HOME HEALTH | DRG: 177 | End: 2021-02-01
Attending: EMERGENCY MEDICINE | Admitting: HOSPITALIST
Payer: MEDICARE

## 2021-01-21 ENCOUNTER — APPOINTMENT (OUTPATIENT)
Dept: GENERAL RADIOLOGY | Age: 71
DRG: 177 | End: 2021-01-21
Attending: STUDENT IN AN ORGANIZED HEALTH CARE EDUCATION/TRAINING PROGRAM
Payer: MEDICARE

## 2021-01-21 DIAGNOSIS — E86.0 DEHYDRATION: ICD-10-CM

## 2021-01-21 DIAGNOSIS — E11.9 TYPE 2 DIABETES MELLITUS WITHOUT COMPLICATION, UNSPECIFIED WHETHER LONG TERM INSULIN USE (HCC): ICD-10-CM

## 2021-01-21 DIAGNOSIS — I10 HYPERTENSION, UNSPECIFIED TYPE: ICD-10-CM

## 2021-01-21 DIAGNOSIS — U07.1 COVID-19: ICD-10-CM

## 2021-01-21 DIAGNOSIS — N17.9 ACUTE RENAL FAILURE, UNSPECIFIED ACUTE RENAL FAILURE TYPE (HCC): Primary | ICD-10-CM

## 2021-01-21 LAB
ALBUMIN SERPL-MCNC: 3.3 G/DL (ref 3.2–4.6)
ALBUMIN/GLOB SERPL: 0.6 {RATIO} (ref 1.2–3.5)
ALP SERPL-CCNC: 78 U/L (ref 50–136)
ALT SERPL-CCNC: 87 U/L (ref 12–65)
ANION GAP SERPL CALC-SCNC: 9 MMOL/L (ref 7–16)
AST SERPL-CCNC: 132 U/L (ref 15–37)
ATRIAL RATE: 227 BPM
BASOPHILS # BLD: 0 K/UL (ref 0–0.2)
BASOPHILS NFR BLD: 0 % (ref 0–2)
BILIRUB SERPL-MCNC: 1 MG/DL (ref 0.2–1.1)
BUN SERPL-MCNC: 55 MG/DL (ref 8–23)
CALCIUM SERPL-MCNC: 9.3 MG/DL (ref 8.3–10.4)
CALCULATED R AXIS, ECG10: 90 DEGREES
CALCULATED T AXIS, ECG11: 125 DEGREES
CHLORIDE SERPL-SCNC: 124 MMOL/L (ref 98–107)
CO2 SERPL-SCNC: 22 MMOL/L (ref 21–32)
CREAT SERPL-MCNC: 2.89 MG/DL (ref 0.8–1.5)
DIAGNOSIS, 93000: NORMAL
DIFFERENTIAL METHOD BLD: ABNORMAL
EOSINOPHIL # BLD: 0 K/UL (ref 0–0.8)
EOSINOPHIL NFR BLD: 0 % (ref 0.5–7.8)
ERYTHROCYTE [DISTWIDTH] IN BLOOD BY AUTOMATED COUNT: 19.9 % (ref 11.9–14.6)
GLOBULIN SER CALC-MCNC: 5.2 G/DL (ref 2.3–3.5)
GLUCOSE SERPL-MCNC: 141 MG/DL (ref 65–100)
HCT VFR BLD AUTO: 53.9 % (ref 41.1–50.3)
HGB BLD-MCNC: 17.2 G/DL (ref 13.6–17.2)
IMM GRANULOCYTES # BLD AUTO: 0.1 K/UL (ref 0–0.5)
IMM GRANULOCYTES NFR BLD AUTO: 1 % (ref 0–5)
LYMPHOCYTES # BLD: 0.6 K/UL (ref 0.5–4.6)
LYMPHOCYTES NFR BLD: 7 % (ref 13–44)
MCH RBC QN AUTO: 26.7 PG (ref 26.1–32.9)
MCHC RBC AUTO-ENTMCNC: 31.9 G/DL (ref 31.4–35)
MCV RBC AUTO: 83.7 FL (ref 79.6–97.8)
MONOCYTES # BLD: 0.6 K/UL (ref 0.1–1.3)
MONOCYTES NFR BLD: 7 % (ref 4–12)
NEUTS SEG # BLD: 8 K/UL (ref 1.7–8.2)
NEUTS SEG NFR BLD: 86 % (ref 43–78)
NRBC # BLD: 0 K/UL (ref 0–0.2)
PLATELET # BLD AUTO: 312 K/UL (ref 150–450)
PMV BLD AUTO: 10.2 FL (ref 9.4–12.3)
POTASSIUM SERPL-SCNC: 4.1 MMOL/L (ref 3.5–5.1)
PROCALCITONIN SERPL-MCNC: 0.59 NG/ML
PROT SERPL-MCNC: 8.5 G/DL (ref 6.3–8.2)
Q-T INTERVAL, ECG07: 354 MS
QRS DURATION, ECG06: 84 MS
QTC CALCULATION (BEZET), ECG08: 408 MS
RBC # BLD AUTO: 6.44 M/UL (ref 4.23–5.6)
SODIUM SERPL-SCNC: 155 MMOL/L (ref 136–145)
VENTRICULAR RATE, ECG03: 80 BPM
WBC # BLD AUTO: 9.3 K/UL (ref 4.3–11.1)

## 2021-01-21 PROCEDURE — 74011250636 HC RX REV CODE- 250/636: Performed by: HOSPITALIST

## 2021-01-21 PROCEDURE — 82570 ASSAY OF URINE CREATININE: CPT

## 2021-01-21 PROCEDURE — 81001 URINALYSIS AUTO W/SCOPE: CPT

## 2021-01-21 PROCEDURE — 84145 PROCALCITONIN (PCT): CPT

## 2021-01-21 PROCEDURE — 74011250637 HC RX REV CODE- 250/637: Performed by: HOSPITALIST

## 2021-01-21 PROCEDURE — 65270000029 HC RM PRIVATE

## 2021-01-21 PROCEDURE — 86901 BLOOD TYPING SEROLOGIC RH(D): CPT

## 2021-01-21 PROCEDURE — 74011250636 HC RX REV CODE- 250/636: Performed by: EMERGENCY MEDICINE

## 2021-01-21 PROCEDURE — 36415 COLL VENOUS BLD VENIPUNCTURE: CPT

## 2021-01-21 PROCEDURE — 84156 ASSAY OF PROTEIN URINE: CPT

## 2021-01-21 PROCEDURE — 74011000258 HC RX REV CODE- 258: Performed by: HOSPITALIST

## 2021-01-21 PROCEDURE — 93005 ELECTROCARDIOGRAM TRACING: CPT | Performed by: STUDENT IN AN ORGANIZED HEALTH CARE EDUCATION/TRAINING PROGRAM

## 2021-01-21 PROCEDURE — 80053 COMPREHEN METABOLIC PANEL: CPT

## 2021-01-21 PROCEDURE — 99285 EMERGENCY DEPT VISIT HI MDM: CPT

## 2021-01-21 PROCEDURE — 2709999900 HC NON-CHARGEABLE SUPPLY

## 2021-01-21 PROCEDURE — 71045 X-RAY EXAM CHEST 1 VIEW: CPT

## 2021-01-21 PROCEDURE — 93005 ELECTROCARDIOGRAM TRACING: CPT | Performed by: EMERGENCY MEDICINE

## 2021-01-21 PROCEDURE — 85025 COMPLETE CBC W/AUTO DIFF WBC: CPT

## 2021-01-21 PROCEDURE — 84300 ASSAY OF URINE SODIUM: CPT

## 2021-01-21 RX ORDER — MELATONIN
2000 DAILY
Status: DISCONTINUED | OUTPATIENT
Start: 2021-01-29 | End: 2021-01-28

## 2021-01-21 RX ORDER — SENNOSIDES 8.6 MG/1
1 TABLET ORAL DAILY PRN
Status: DISCONTINUED | OUTPATIENT
Start: 2021-01-21 | End: 2021-02-01 | Stop reason: HOSPADM

## 2021-01-21 RX ORDER — SODIUM CHLORIDE 0.9 % (FLUSH) 0.9 %
5-40 SYRINGE (ML) INJECTION EVERY 8 HOURS
Status: DISCONTINUED | OUTPATIENT
Start: 2021-01-21 | End: 2021-02-01 | Stop reason: HOSPADM

## 2021-01-21 RX ORDER — ONDANSETRON 4 MG/1
4 TABLET, ORALLY DISINTEGRATING ORAL
Status: DISCONTINUED | OUTPATIENT
Start: 2021-01-21 | End: 2021-02-01 | Stop reason: HOSPADM

## 2021-01-21 RX ORDER — POLYETHYLENE GLYCOL 3350 17 G/17G
17 POWDER, FOR SOLUTION ORAL DAILY
Status: DISCONTINUED | OUTPATIENT
Start: 2021-01-22 | End: 2021-02-01 | Stop reason: HOSPADM

## 2021-01-21 RX ORDER — METOPROLOL SUCCINATE 50 MG/1
100 TABLET, EXTENDED RELEASE ORAL DAILY
Status: DISCONTINUED | OUTPATIENT
Start: 2021-01-22 | End: 2021-02-01 | Stop reason: HOSPADM

## 2021-01-21 RX ORDER — MELATONIN
6000 DAILY
Status: COMPLETED | OUTPATIENT
Start: 2021-01-21 | End: 2021-01-28

## 2021-01-21 RX ORDER — ACETAMINOPHEN 325 MG/1
650 TABLET ORAL
Status: DISCONTINUED | OUTPATIENT
Start: 2021-01-21 | End: 2021-02-01 | Stop reason: HOSPADM

## 2021-01-21 RX ORDER — SODIUM CHLORIDE 9 MG/ML
150 INJECTION, SOLUTION INTRAVENOUS CONTINUOUS
Status: DISCONTINUED | OUTPATIENT
Start: 2021-01-21 | End: 2021-01-21

## 2021-01-21 RX ORDER — SODIUM CHLORIDE 0.9 % (FLUSH) 0.9 %
5-40 SYRINGE (ML) INJECTION AS NEEDED
Status: DISCONTINUED | OUTPATIENT
Start: 2021-01-21 | End: 2021-02-01 | Stop reason: HOSPADM

## 2021-01-21 RX ORDER — FACIAL-BODY WIPES
10 EACH TOPICAL DAILY PRN
Status: DISCONTINUED | OUTPATIENT
Start: 2021-01-21 | End: 2021-02-01 | Stop reason: HOSPADM

## 2021-01-21 RX ORDER — LORATADINE 10 MG/1
10 TABLET ORAL
Status: DISCONTINUED | OUTPATIENT
Start: 2021-01-21 | End: 2021-02-01 | Stop reason: HOSPADM

## 2021-01-21 RX ORDER — ACETAMINOPHEN 650 MG/1
650 SUPPOSITORY RECTAL
Status: DISCONTINUED | OUTPATIENT
Start: 2021-01-21 | End: 2021-02-01 | Stop reason: HOSPADM

## 2021-01-21 RX ORDER — GUAIFENESIN/DEXTROMETHORPHAN 100-10MG/5
5 SYRUP ORAL
Status: DISCONTINUED | OUTPATIENT
Start: 2021-01-21 | End: 2021-02-01 | Stop reason: HOSPADM

## 2021-01-21 RX ORDER — ASCORBIC ACID 500 MG
1000 TABLET ORAL DAILY
Status: DISCONTINUED | OUTPATIENT
Start: 2021-01-22 | End: 2021-02-01 | Stop reason: HOSPADM

## 2021-01-21 RX ORDER — PRAVASTATIN SODIUM 20 MG/1
40 TABLET ORAL
Status: DISCONTINUED | OUTPATIENT
Start: 2021-01-21 | End: 2021-02-01 | Stop reason: HOSPADM

## 2021-01-21 RX ORDER — SODIUM CHLORIDE 9 MG/ML
250 INJECTION, SOLUTION INTRAVENOUS AS NEEDED
Status: DISCONTINUED | OUTPATIENT
Start: 2021-01-21 | End: 2021-02-01 | Stop reason: HOSPADM

## 2021-01-21 RX ORDER — UREA 10 %
220 LOTION (ML) TOPICAL DAILY
Status: DISCONTINUED | OUTPATIENT
Start: 2021-01-22 | End: 2021-01-22 | Stop reason: SDUPTHER

## 2021-01-21 RX ORDER — FAMOTIDINE 20 MG/1
20 TABLET, FILM COATED ORAL 2 TIMES DAILY
Status: DISCONTINUED | OUTPATIENT
Start: 2021-01-22 | End: 2021-01-22

## 2021-01-21 RX ORDER — AMIODARONE HYDROCHLORIDE 200 MG/1
200 TABLET ORAL DAILY
Status: DISCONTINUED | OUTPATIENT
Start: 2021-01-22 | End: 2021-02-01 | Stop reason: HOSPADM

## 2021-01-21 RX ORDER — NYSTATIN 100000 [USP'U]/G
POWDER TOPICAL 4 TIMES DAILY
Status: DISCONTINUED | OUTPATIENT
Start: 2021-01-21 | End: 2021-02-01 | Stop reason: HOSPADM

## 2021-01-21 RX ORDER — SODIUM CHLORIDE 450 MG/100ML
100 INJECTION, SOLUTION INTRAVENOUS CONTINUOUS
Status: DISCONTINUED | OUTPATIENT
Start: 2021-01-21 | End: 2021-01-23

## 2021-01-21 RX ORDER — DEXAMETHASONE SODIUM PHOSPHATE 100 MG/10ML
6 INJECTION INTRAMUSCULAR; INTRAVENOUS EVERY 24 HOURS
Status: COMPLETED | OUTPATIENT
Start: 2021-01-21 | End: 2021-01-30

## 2021-01-21 RX ADMIN — NYSTATIN: 100000 POWDER TOPICAL at 23:00

## 2021-01-21 RX ADMIN — Medication 10 ML: at 23:00

## 2021-01-21 RX ADMIN — DEXAMETHASONE SODIUM PHOSPHATE 6 MG: 10 INJECTION INTRAMUSCULAR; INTRAVENOUS at 23:00

## 2021-01-21 RX ADMIN — SODIUM CHLORIDE 100 ML/HR: 450 INJECTION, SOLUTION INTRAVENOUS at 22:55

## 2021-01-21 RX ADMIN — CEFTRIAXONE SODIUM 2 G: 2 INJECTION, POWDER, FOR SOLUTION INTRAMUSCULAR; INTRAVENOUS at 23:00

## 2021-01-21 RX ADMIN — CHOLECALCIFEROL TAB 25 MCG (1000 UNIT) 6 TABLET: 25 TAB at 23:00

## 2021-01-21 RX ADMIN — SODIUM CHLORIDE 1000 ML: 900 INJECTION, SOLUTION INTRAVENOUS at 21:11

## 2021-01-21 RX ADMIN — PRAVASTATIN SODIUM 40 MG: 20 TABLET ORAL at 23:00

## 2021-01-21 NOTE — ED PROVIDER NOTES
Patient has a history of type 2 diabetes, hypertension, obesity, arthritis who was diagnosed with COVID-19 recently. He lives at home with his debilitated wife who is on home hospice. He states that he has been short of breath with vomiting and diarrhea. He is finding it difficult to feed himself. Denies any fever, has been feeling profoundly weak. He is not on supplemental oxygen at home. He has had an occasional cough.            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    S/P total knee replacement using cement 7/20/2011    right knee    Unspecified adverse effect of anesthesia     reports unable to do spinal with right knee due to collapsed discs/ no problem with GA       Past Surgical History:   Procedure Laterality Date    COLONOSCOPY N/A 2/17/2020    COLONOSCOPY performed by Anton Bustillo MD at [facility] ENDOSCOPY    HX ORTHOPAEDIC  July 20,2011    replaced right TKA/ L TKA in Dec 2011         Family History:   Problem Relation Age of Onset    Diabetes Mother     Heart Disease Father     Diabetes Sister     Diabetes Maternal Aunt        Social History     Socioeconomic History    Marital status:      Spouse name: Not on file    Number of children: Not on file    Years of education: Not on file    Highest education level: Not on file   Occupational History    Not on file   Social Needs    Financial resource strain: Not on file    Food insecurity     Worry: Not on file     Inability: Not on file    Transportation needs     Medical: Not on file     Non-medical: Not on file   Tobacco Use    Smoking status: Never Smoker    Smokeless tobacco: Never Used   Substance and Sexual Activity    Alcohol use: No    Drug use: Not on file    Sexual activity: Not on file   Lifestyle    Physical activity     Days per week: Not on file     Minutes per session: Not on file    Stress: Not on file   Relationships    Social connections     Talks on phone: Not on file     Gets together: Not on file     Attends Mosque service: Not on file     Active member of club or organization: Not on file     Attends meetings of clubs or organizations: Not on file     Relationship status: Not on file    Intimate partner violence     Fear of current or ex partner: Not on file     Emotionally abused: Not on file     Physically abused: Not on file     Forced sexual activity: Not on file   Other Topics Concern    Not on file   Social History Narrative    Not on file         ALLERGIES: Adhesive, Other medication, Pcn [penicillins], and Pollen extracts    Review of Systems   Constitutional: Positive for fatigue. Negative for chills and fever. Respiratory: Positive for cough and shortness of breath. Gastrointestinal: Positive for diarrhea, nausea and vomiting. All other systems reviewed and are negative. Vitals:    01/21/21 1634 01/21/21 1646   BP: (!) 138/90    Pulse: 82    Resp:  21   SpO2: 94%    Weight:  95.3 kg (210 lb)   Height:  5' 9\" (1.753 m)            Physical Exam  Vitals signs and nursing note reviewed. Constitutional:       Appearance: Normal appearance. He is well-developed. HENT:      Head: Normocephalic and atraumatic. Eyes:      Conjunctiva/sclera: Conjunctivae normal.      Pupils: Pupils are equal, round, and reactive to light. Neck:      Musculoskeletal: Normal range of motion and neck supple. Cardiovascular:      Rate and Rhythm: Normal rate and regular rhythm. Pulses: Normal pulses. Heart sounds: Normal heart sounds. Pulmonary:      Effort: Pulmonary effort is normal. No respiratory distress.    Abdominal:      General: There is no distension. Tenderness: There is no abdominal tenderness. Musculoskeletal: Normal range of motion. Skin:     General: Skin is warm and dry. Neurological:      Mental Status: He is alert and oriented to person, place, and time. MDM  Number of Diagnoses or Management Options  Acute renal failure, unspecified acute renal failure type [facility]): new and requires workup  COVID-19  Dehydration: new and requires workup  Hypertension, unspecified type  Type 2 diabetes mellitus without complication, unspecified whether long term insulin use ([facility] [address]  Diagnosis management comments: 8:51 PM discussed results with patient. His creatinine has bumped markedly from a baseline of 1.45 days ago to 2.9 today per old records. This is consistent with his history of nausea vomiting. Given his inability to keep anything down, he will be admitted to the hospital for IV fluids and antiemetics. The hospitalist has been paged.        Amount and/or Complexity of Data Reviewed  Clinical lab tests: ordered and reviewed  Tests in the radiology section of CPT®: ordered and reviewed  Tests in the medicine section of CPT®: ordered and reviewed  Discuss the patient with other providers: yes    Risk of Complications, Morbidity, and/or Mortality  Presenting problems: moderate  Diagnostic procedures: moderate  Management options: moderate    Patient Progress  Patient progress: improved         Procedures

## 2021-01-22 LAB
ABO + RH BLD: NORMAL
ALBUMIN SERPL-MCNC: 2.9 G/DL (ref 3.2–4.6)
ALBUMIN/GLOB SERPL: 0.6 {RATIO} (ref 1.2–3.5)
ALP SERPL-CCNC: 72 U/L (ref 50–136)
ALT SERPL-CCNC: 100 U/L (ref 12–65)
ANION GAP SERPL CALC-SCNC: 11 MMOL/L (ref 7–16)
ANION GAP SERPL CALC-SCNC: 9 MMOL/L (ref 7–16)
APPEARANCE UR: ABNORMAL
AST SERPL-CCNC: 181 U/L (ref 15–37)
BACTERIA URNS QL MICRO: ABNORMAL /HPF
BASOPHILS # BLD: 0 K/UL (ref 0–0.2)
BASOPHILS NFR BLD: 0 % (ref 0–2)
BILIRUB SERPL-MCNC: 0.8 MG/DL (ref 0.2–1.1)
BILIRUB UR QL: ABNORMAL
BLOOD GROUP ANTIBODIES SERPL: NORMAL
BUN SERPL-MCNC: 60 MG/DL (ref 8–23)
BUN SERPL-MCNC: 67 MG/DL (ref 8–23)
CALCIUM SERPL-MCNC: 8.7 MG/DL (ref 8.3–10.4)
CALCIUM SERPL-MCNC: 9.1 MG/DL (ref 8.3–10.4)
CASTS URNS QL MICRO: ABNORMAL /LPF
CHLORIDE SERPL-SCNC: 123 MMOL/L (ref 98–107)
CHLORIDE SERPL-SCNC: 126 MMOL/L (ref 98–107)
CO2 SERPL-SCNC: 21 MMOL/L (ref 21–32)
CO2 SERPL-SCNC: 24 MMOL/L (ref 21–32)
COLOR UR: ABNORMAL
CREAT SERPL-MCNC: 2.56 MG/DL (ref 0.8–1.5)
CREAT SERPL-MCNC: 2.6 MG/DL (ref 0.8–1.5)
CREAT UR-MCNC: 222 MG/DL
DIFFERENTIAL METHOD BLD: ABNORMAL
EOSINOPHIL # BLD: 0 K/UL (ref 0–0.8)
EOSINOPHIL NFR BLD: 0 % (ref 0.5–7.8)
ERYTHROCYTE [DISTWIDTH] IN BLOOD BY AUTOMATED COUNT: 20 % (ref 11.9–14.6)
GLOBULIN SER CALC-MCNC: 5 G/DL (ref 2.3–3.5)
GLUCOSE BLD STRIP.AUTO-MCNC: 213 MG/DL (ref 65–100)
GLUCOSE BLD STRIP.AUTO-MCNC: 264 MG/DL (ref 65–100)
GLUCOSE SERPL-MCNC: 115 MG/DL (ref 65–100)
GLUCOSE SERPL-MCNC: 195 MG/DL (ref 65–100)
GLUCOSE UR STRIP.AUTO-MCNC: NEGATIVE MG/DL
HCT VFR BLD AUTO: 51.8 % (ref 41.1–50.3)
HGB BLD-MCNC: 16.3 G/DL (ref 13.6–17.2)
HGB UR QL STRIP: ABNORMAL
IMM GRANULOCYTES # BLD AUTO: 0.1 K/UL (ref 0–0.5)
IMM GRANULOCYTES NFR BLD AUTO: 1 % (ref 0–5)
KETONES UR QL STRIP.AUTO: NEGATIVE MG/DL
LEUKOCYTE ESTERASE UR QL STRIP.AUTO: NEGATIVE
LYMPHOCYTES # BLD: 0.5 K/UL (ref 0.5–4.6)
LYMPHOCYTES NFR BLD: 6 % (ref 13–44)
MAGNESIUM SERPL-MCNC: 3.3 MG/DL (ref 1.8–2.4)
MCH RBC QN AUTO: 26.7 PG (ref 26.1–32.9)
MCHC RBC AUTO-ENTMCNC: 31.5 G/DL (ref 31.4–35)
MCV RBC AUTO: 84.8 FL (ref 79.6–97.8)
MONOCYTES # BLD: 0.3 K/UL (ref 0.1–1.3)
MONOCYTES NFR BLD: 3 % (ref 4–12)
MUCOUS THREADS URNS QL MICRO: ABNORMAL /LPF
NEUTS SEG # BLD: 7.6 K/UL (ref 1.7–8.2)
NEUTS SEG NFR BLD: 90 % (ref 43–78)
NITRITE UR QL STRIP.AUTO: NEGATIVE
NRBC # BLD: 0 K/UL (ref 0–0.2)
OTHER OBSERVATIONS,UCOM: ABNORMAL
PH UR STRIP: 5 [PH] (ref 5–9)
PLATELET # BLD AUTO: 270 K/UL (ref 150–450)
PLATELET COMMENTS,PCOM: ADEQUATE
PMV BLD AUTO: 9.7 FL (ref 9.4–12.3)
POTASSIUM SERPL-SCNC: 3.8 MMOL/L (ref 3.5–5.1)
POTASSIUM SERPL-SCNC: 4 MMOL/L (ref 3.5–5.1)
PROT SERPL-MCNC: 7.9 G/DL (ref 6.3–8.2)
PROT UR STRIP-MCNC: 30 MG/DL
PROT UR-MCNC: 105 MG/DL
RBC # BLD AUTO: 6.11 M/UL (ref 4.23–5.6)
RBC #/AREA URNS HPF: ABNORMAL /HPF
RBC MORPH BLD: ABNORMAL
SODIUM SERPL-SCNC: 155 MMOL/L (ref 136–145)
SODIUM SERPL-SCNC: 159 MMOL/L (ref 136–145)
SODIUM UR-SCNC: 17 MMOL/L
SP GR UR REFRACTOMETRY: 1.02 (ref 1–1.02)
SPECIMEN EXP DATE BLD: NORMAL
UROBILINOGEN UR QL STRIP.AUTO: 0.2 EU/DL (ref 0.2–1)
WBC # BLD AUTO: 8.5 K/UL (ref 4.3–11.1)
WBC MORPH BLD: ABNORMAL
WBC URNS QL MICRO: ABNORMAL /HPF

## 2021-01-22 PROCEDURE — 97162 PT EVAL MOD COMPLEX 30 MIN: CPT

## 2021-01-22 PROCEDURE — 51798 US URINE CAPACITY MEASURE: CPT

## 2021-01-22 PROCEDURE — 74011250637 HC RX REV CODE- 250/637: Performed by: HOSPITALIST

## 2021-01-22 PROCEDURE — 74011250636 HC RX REV CODE- 250/636: Performed by: HOSPITALIST

## 2021-01-22 PROCEDURE — 65270000029 HC RM PRIVATE

## 2021-01-22 PROCEDURE — 85025 COMPLETE CBC W/AUTO DIFF WBC: CPT

## 2021-01-22 PROCEDURE — 2709999900 HC NON-CHARGEABLE SUPPLY

## 2021-01-22 PROCEDURE — 82962 GLUCOSE BLOOD TEST: CPT

## 2021-01-22 PROCEDURE — 97530 THERAPEUTIC ACTIVITIES: CPT

## 2021-01-22 PROCEDURE — 74011250637 HC RX REV CODE- 250/637: Performed by: INTERNAL MEDICINE

## 2021-01-22 PROCEDURE — 36415 COLL VENOUS BLD VENIPUNCTURE: CPT

## 2021-01-22 PROCEDURE — 83735 ASSAY OF MAGNESIUM: CPT

## 2021-01-22 PROCEDURE — 74011000258 HC RX REV CODE- 258: Performed by: HOSPITALIST

## 2021-01-22 PROCEDURE — 80053 COMPREHEN METABOLIC PANEL: CPT

## 2021-01-22 PROCEDURE — 74011636637 HC RX REV CODE- 636/637: Performed by: HOSPITALIST

## 2021-01-22 PROCEDURE — 92610 EVALUATE SWALLOWING FUNCTION: CPT

## 2021-01-22 RX ORDER — METFORMIN HYDROCHLORIDE 500 MG/1
1000 TABLET, EXTENDED RELEASE ORAL
COMMUNITY

## 2021-01-22 RX ORDER — TORSEMIDE 100 MG/1
50 TABLET ORAL DAILY
COMMUNITY
End: 2021-02-03

## 2021-01-22 RX ORDER — INSULIN GLARGINE 100 [IU]/ML
10 INJECTION, SOLUTION SUBCUTANEOUS
Status: DISCONTINUED | OUTPATIENT
Start: 2021-01-22 | End: 2021-01-25

## 2021-01-22 RX ORDER — ZINC SULFATE 50(220)MG
1 CAPSULE ORAL DAILY
Status: DISCONTINUED | OUTPATIENT
Start: 2021-01-22 | End: 2021-02-01 | Stop reason: HOSPADM

## 2021-01-22 RX ORDER — PANTOPRAZOLE SODIUM 40 MG/1
40 TABLET, DELAYED RELEASE ORAL DAILY
Status: DISCONTINUED | OUTPATIENT
Start: 2021-01-23 | End: 2021-02-01 | Stop reason: HOSPADM

## 2021-01-22 RX ORDER — FERROUS SULFATE 325(65) MG
325 TABLET, DELAYED RELEASE (ENTERIC COATED) ORAL
COMMUNITY

## 2021-01-22 RX ORDER — INSULIN LISPRO 100 [IU]/ML
0-5 INJECTION, SOLUTION INTRAVENOUS; SUBCUTANEOUS
Status: DISCONTINUED | OUTPATIENT
Start: 2021-01-22 | End: 2021-01-25

## 2021-01-22 RX ORDER — FAMOTIDINE 20 MG/1
20 TABLET, FILM COATED ORAL DAILY
Status: DISCONTINUED | OUTPATIENT
Start: 2021-01-23 | End: 2021-01-22 | Stop reason: SDUPTHER

## 2021-01-22 RX ORDER — LANOLIN ALCOHOL/MO/W.PET/CERES
400 CREAM (GRAM) TOPICAL 2 TIMES DAILY
COMMUNITY
End: 2021-02-03

## 2021-01-22 RX ORDER — LANOLIN ALCOHOL/MO/W.PET/CERES
1 CREAM (GRAM) TOPICAL 2 TIMES DAILY WITH MEALS
Status: DISCONTINUED | OUTPATIENT
Start: 2021-01-22 | End: 2021-02-01 | Stop reason: HOSPADM

## 2021-01-22 RX ORDER — INSULIN LISPRO 100 [IU]/ML
0.05 INJECTION, SOLUTION INTRAVENOUS; SUBCUTANEOUS
Status: DISCONTINUED | OUTPATIENT
Start: 2021-01-22 | End: 2021-01-25

## 2021-01-22 RX ORDER — PANTOPRAZOLE SODIUM 40 MG/1
40 TABLET, DELAYED RELEASE ORAL DAILY
COMMUNITY

## 2021-01-22 RX ORDER — POTASSIUM CHLORIDE 1500 MG/1
20 TABLET, FILM COATED, EXTENDED RELEASE ORAL DAILY
COMMUNITY

## 2021-01-22 RX ORDER — INSULIN GLARGINE 100 [IU]/ML
INJECTION, SOLUTION SUBCUTANEOUS
COMMUNITY
End: 2021-02-03

## 2021-01-22 RX ADMIN — NYSTATIN: 100000 POWDER TOPICAL at 08:44

## 2021-01-22 RX ADMIN — NYSTATIN: 100000 POWDER TOPICAL at 12:22

## 2021-01-22 RX ADMIN — CEFTRIAXONE SODIUM 2 G: 2 INJECTION, POWDER, FOR SOLUTION INTRAMUSCULAR; INTRAVENOUS at 21:26

## 2021-01-22 RX ADMIN — AMIODARONE HYDROCHLORIDE 200 MG: 200 TABLET ORAL at 08:43

## 2021-01-22 RX ADMIN — PRAVASTATIN SODIUM 40 MG: 20 TABLET ORAL at 21:21

## 2021-01-22 RX ADMIN — FAMOTIDINE 20 MG: 20 TABLET, FILM COATED ORAL at 08:43

## 2021-01-22 RX ADMIN — METOPROLOL SUCCINATE 100 MG: 50 TABLET, EXTENDED RELEASE ORAL at 08:43

## 2021-01-22 RX ADMIN — FERROUS SULFATE TAB 325 MG (65 MG ELEMENTAL FE) 325 MG: 325 (65 FE) TAB at 17:06

## 2021-01-22 RX ADMIN — INSULIN GLARGINE 10 UNITS: 100 INJECTION, SOLUTION SUBCUTANEOUS at 22:00

## 2021-01-22 RX ADMIN — Medication 10 ML: at 22:00

## 2021-01-22 RX ADMIN — NYSTATIN: 100000 POWDER TOPICAL at 17:07

## 2021-01-22 RX ADMIN — INSULIN LISPRO 2 UNITS: 100 INJECTION, SOLUTION INTRAVENOUS; SUBCUTANEOUS at 22:00

## 2021-01-22 RX ADMIN — ZINC SULFATE 220 MG (50 MG) CAPSULE 1 CAPSULE: CAPSULE at 08:43

## 2021-01-22 RX ADMIN — NYSTATIN: 100000 POWDER TOPICAL at 22:00

## 2021-01-22 RX ADMIN — GUAIFENESIN AND DEXTROMETHORPHAN 5 ML: 100; 10 SYRUP ORAL at 08:48

## 2021-01-22 RX ADMIN — Medication 10 ML: at 06:00

## 2021-01-22 RX ADMIN — OXYCODONE HYDROCHLORIDE AND ACETAMINOPHEN 1000 MG: 500 TABLET ORAL at 08:43

## 2021-01-22 RX ADMIN — POLYETHYLENE GLYCOL 3350 17 G: 17 POWDER, FOR SOLUTION ORAL at 08:44

## 2021-01-22 RX ADMIN — APIXABAN 5 MG: 5 TABLET, FILM COATED ORAL at 08:43

## 2021-01-22 RX ADMIN — APIXABAN 5 MG: 5 TABLET, FILM COATED ORAL at 17:06

## 2021-01-22 RX ADMIN — INSULIN LISPRO 5 UNITS: 100 INJECTION, SOLUTION INTRAVENOUS; SUBCUTANEOUS at 17:06

## 2021-01-22 RX ADMIN — ACETAMINOPHEN 650 MG: 325 TABLET, FILM COATED ORAL at 08:48

## 2021-01-22 RX ADMIN — DEXAMETHASONE SODIUM PHOSPHATE 6 MG: 10 INJECTION INTRAMUSCULAR; INTRAVENOUS at 21:30

## 2021-01-22 RX ADMIN — SODIUM CHLORIDE 100 ML/HR: 450 INJECTION, SOLUTION INTRAVENOUS at 08:44

## 2021-01-22 RX ADMIN — SODIUM CHLORIDE 100 ML/HR: 450 INJECTION, SOLUTION INTRAVENOUS at 17:11

## 2021-01-22 RX ADMIN — Medication 10 ML: at 16:35

## 2021-01-22 NOTE — PROGRESS NOTES
Late entry:  Patient A/O arrived to floor via stretcher respirations even on RA there are no signs of distress at this time  Bed in lowest position wheels locked call light within reach  Patient instructed to call for assistance when needed

## 2021-01-22 NOTE — ED NOTES
TRANSFER - OUT REPORT:    Verbal report given to Yesika Cortez RN (name) on Tono Norton  being transferred to 8(unit) for routine progression of care       Report consisted of patients Situation, Background, Assessment and   Recommendations(SBAR). Information from the following report(s) SBAR, Kardex, ED Summary, [place] and Recent Results was reviewed with the receiving nurse. Lines:   Peripheral IV 01/21/21 Right Antecubital (Active)        Opportunity for questions and clarification was provided.       Patient transported with:   [employer]

## 2021-01-22 NOTE — ACP (ADVANCE CARE PLANNING)
Advance care planninginitial encounter      Face to face time - 18 minutes face-to-face time spent discussion the care     POA wife Farnaz Fisher    Pt's decision making capacity   [*] Yes  [ ] NO    Names of the family/surrogate decision maker during this encounter  : Shanel Rhodes self      Patient / surrogate decision-maker ultimately chose. ..    [ ] Full code- full aggressive medical and surgical interventions, including intubations, resuscitations, pressors, artificial tube feeding  [* ] DO NOT RESUSCITATE -okay to intubate,  and other aggressive medical and surgical intervention   [ ] Not resuscitate, DO NOT INTUBATE, but okay for other aggressive medical and surgical intervention   [ ] DNR/DNI, hospice, comfort focus care to maximize patient's quality of life  [ ] DNR/DNI, strict comfort care ONLY        Further discussion regarding principle disease process.  prognosis, plans of care, and treatment goals  : Patient is admitted for aggressive medical treatments, otherwise wish  DO NOT RESUSCITATE DO NOT INTUBATE, also ask about wife who was admitted

## 2021-01-22 NOTE — PROGRESS NOTES
[facility] Hospitalist Service Progress Note      Assessment / Plan:    Acute respiratory failure with hypoxia 2/2 Covid-19 Pneumonia  Convalescent plasma ordered--pt agreeable  Dexamethasone for 10 days  Remdesivir for 5 days  Zinc/vitamin C/vit D daily  Added IS and albuterol prn  Prone as tolerated  SSI while on steroids  Oxygen supplementation. Wean O2 as tolerated  F/u D-Dimer and other covid labs. Monitor renal and hepatic function while on remdesivir.   Empiric Rocephin's, follow procalcitonin, discontinue if indicated  January 22 procalcitonin 0.5 5/9, equivocal, will continue Rocephin      Acute kidney injuryfollow urine electrolytes, gentle IV fluids, bladder scan as needed, monitor renal function  January 22renal function improving serum creatinine 2.56, continue IV fluid     Hypernatremia most likely due to dehydration's, will hydrate with half-normal saline, monitor repeat  322sodium improving 155, continue above     History of systolic heart failure, cor pulmonale, peroxisomal atrial fibrillation's, hypertensions, diabetes type 2, paroxysmal atrial fibrillation on chronic Eliquis  -Discontinue IV fluids when patient reached euvolemia,  -resume home medications if indicated ,and monitor clinically while inpatient, currently stable co morbidities add to patient's case complexity        DVT PPx: lovenox  Code Status: dnr/dni      Anticipated discharge: 2-4 days, depending on patient's progression       Chief Complaint : Shortness of Breath      Subjective:     No acute events overnight, patient has no new complaints today,  Cardiovascular, respiratory, GI review of system negative except mentioned above  Objective:  Visit Vitals  /67   Pulse 80   Temp 98.3 °F (36.8 °C)   Resp 19   Ht 5' 9\" (1.753 m)   Wt 95.3 kg (210 lb)   SpO2 91%   BMI 31.01 kg/m²                 Physical Exam:  General: No acute distress, speaking in full sentences, no use of accessory muscles   HEENT: Pupils equal and reactive to light and accommodation, oropharynx is clear   Neck: Supple, no lymphadenopathy, no JVD   Lungs: Clear to auscultation bilaterally   Cardiovascular: Regular rate and rhythm with normal S1 and S2   Abdomen: Soft, nontender, nondistended, normoactive bowel sounds   Extremities: No cyanosis clubbing or edema   Neuro: Nonfocal, A&O x3   Psych: Normal affect     Intake and Output:  Date 01/21/21 0700 - 01/22/21 0659 01/22/21 0700 - 01/23/21 0659   Shift 0700-1859 1900-0659 24 Hour Total 0700-1859 1900-0659 24 Hour Total   INTAKE   P.O.    600  600     P. O.    600  600   Shift Total(mL/kg)    600(6.3)  600(6.3)   OUTPUT   Urine(mL/kg/hr)           Urine Occurrence(s)    1 x  1 x   Stool           Stool Occurrence(s)    1 x  1 x   Shift Total(mL/kg)         NET    600  600   Weight (kg) 95.3 95.3 95.3 95.3 95.3 95.3       LAB:  Admission on 01/21/2021   Component Date Value    WBC 01/21/2021 9.3     RBC 01/21/2021 6.44*    HGB 01/21/2021 17.2     HCT 01/21/2021 53.9*    MCV 01/21/2021 83.7     MCH 01/21/2021 26.7     MCHC 01/21/2021 31.9     RDW 01/21/2021 19.9*    PLATELET 97/38/1252 923     MPV 01/21/2021 10.2     ABSOLUTE NRBC 01/21/2021 0.00     DF 01/21/2021 AUTOMATED     NEUTROPHILS 01/21/2021 86*    LYMPHOCYTES 01/21/2021 7*    MONOCYTES 01/21/2021 7     EOSINOPHILS 01/21/2021 0*    BASOPHILS 01/21/2021 0     IMMATURE GRANULOCYTES 01/21/2021 1     ABS. NEUTROPHILS 01/21/2021 8.0     ABS. LYMPHOCYTES 01/21/2021 0.6     ABS. MONOCYTES 01/21/2021 0.6     ABS. EOSINOPHILS 01/21/2021 0.0     ABS. BASOPHILS 01/21/2021 0.0     ABS. IMM.  GRANS. 01/21/2021 0.1     Sodium 01/21/2021 155*    Potassium 01/21/2021 4.1     Chloride 01/21/2021 124*    CO2 01/21/2021 22     Anion gap 01/21/2021 9     Glucose 01/21/2021 141*    BUN 01/21/2021 55*    Creatinine 01/21/2021 2.89*    GFR est AA 01/21/2021 28*    GFR est non-AA 01/21/2021 23*    Calcium 01/21/2021 9.3     Bilirubin, total 01/21/2021 1. 0     ALT (SGPT) 01/21/2021 87*    AST (SGOT) 01/21/2021 132*    Alk. phosphatase 01/21/2021 78     Protein, total 01/21/2021 8.5*    Albumin 01/21/2021 3.3     Globulin 01/21/2021 5.2*    A-G Ratio 01/21/2021 0.6*    Ventricular Rate 01/21/2021 80     Atrial Rate 01/21/2021 227     QRS Duration 01/21/2021 84     Q-T Interval 01/21/2021 354     QTC Calculation (Bezet) 01/21/2021 408     Calculated R Axis 01/21/2021 90     Calculated T Axis 01/21/2021 125     Diagnosis 01/21/2021                      Value:Electronic ventricular pacemaker  has replaced sinus rhythm   low voltage   Confirmed by ROSY SHAFER (), NESHA ESQUIVEL ([id number]) on 1/21/2021 6:27:29 PM      Crossmatch Expiration 01/21/2021 01/24/2021,2359     ABO/Rh(D) 01/21/2021 B NEGATIVE     Antibody screen 01/21/2021 NEG     Procalcitonin 01/21/2021 0.59     WBC 01/22/2021 8.5     RBC 01/22/2021 6.11*    HGB 01/22/2021 16.3     HCT 01/22/2021 51.8*    MCV 01/22/2021 84.8     MCH 01/22/2021 26.7     MCHC 01/22/2021 31.5     RDW 01/22/2021 20.0*    PLATELET 26/44/3817 629     MPV 01/22/2021 9.7     ABSOLUTE NRBC 01/22/2021 0.00     NEUTROPHILS 01/22/2021 90*    LYMPHOCYTES 01/22/2021 6*    MONOCYTES 01/22/2021 3*    EOSINOPHILS 01/22/2021 0*    BASOPHILS 01/22/2021 0     IMMATURE GRANULOCYTES 01/22/2021 1     ABS. NEUTROPHILS 01/22/2021 7.6     ABS. LYMPHOCYTES 01/22/2021 0.5     ABS. MONOCYTES 01/22/2021 0.3     ABS. EOSINOPHILS 01/22/2021 0.0     ABS. BASOPHILS 01/22/2021 0.0     ABS. IMM.  GRANS. 01/22/2021 0.1     RBC COMMENTS 01/22/2021 NORMOCYTIC/NORMOCHROMIC     WBC COMMENTS 01/22/2021 Result Confirmed By Smear     PLATELET COMMENTS 17/45/0067 ADEQUATE     DF 01/22/2021 AUTOMATED     Sodium 01/22/2021 155*    Potassium 01/22/2021 3.8     Chloride 01/22/2021 123*    CO2 01/22/2021 21     Anion gap 01/22/2021 11     Glucose 01/22/2021 195*    BUN 01/22/2021 67*    Creatinine 01/22/2021 2.56*    GFR est AA 01/22/2021 32*    GFR est non-AA 01/22/2021 27*    Calcium 01/22/2021 8.7     Bilirubin, total 01/22/2021 0.8     ALT (SGPT) 01/22/2021 100*    AST (SGOT) 01/22/2021 181*    Alk. phosphatase 01/22/2021 72     Protein, total 01/22/2021 7.9     Albumin 01/22/2021 2.9*    Globulin 01/22/2021 5.0*    A-G Ratio 01/22/2021 0.6*    Magnesium 01/22/2021 3.3*    Sodium 01/21/2021 159*    Potassium 01/21/2021 4.0     Chloride 01/21/2021 126*    CO2 01/21/2021 24     Anion gap 01/21/2021 9     Glucose 01/21/2021 115*    BUN 01/21/2021 60*    Creatinine 01/21/2021 2.60*    GFR est AA 01/21/2021 32*    GFR est non-AA 01/21/2021 26*    Calcium 01/21/2021 9.1     Glucose (POC) 01/22/2021 264*       IMAGING:  Xr Chest Port    Result Date: 1/21/2021  AICD without acute cardiopulmonary abnormality. Xr Chest Port    Result Date: 1/16/2021  IMPRESSION:  NO ACUTE CARDIOPULMONARY DISEASE IDENTIFIED. EKG:  No results found for this or any previous visit.           Krupa Palomares DO  1/22/2021 6:38 PM

## 2021-01-22 NOTE — PROGRESS NOTES
Care Management Interventions  PCP Verified by CM: Yes  Physical Therapy Consult: Yes  Occupational Therapy Consult: No  Speech Therapy Consult: Yes  Current Support Network: Lives with Spouse  Confirm Follow Up Transport: Self  Freedom of Choice List was Provided with Basic Dialogue that Supports the Patient's Individualized Plan of Care/Goals, Treatment Preferences and Shares the Quality Data Associated with the Providers?: Yes   Resource Information Provided?: No  Discharge Location  Discharge Placement: Unable to determine at this time  Patient is alert and oriented in all spheres. Lives with his wife. Patient's wife is under hospice care [facility] and he's the primary caregiver. He has no family support. DME: bipap, cane, walker  No history of HH or rehab. Drives. PT/ST consulted. Patient believes he'll be able to discharge home. CM following.

## 2021-01-22 NOTE — PROGRESS NOTES
01/22/21 2nd attempt to notify patient of positive Covid 19 result; patient noted to have been admitted; per admission note patient patient aware of positive Covid 19 result

## 2021-01-22 NOTE — PROGRESS NOTES
LTG: Patient will tolerate least restrictive diet without overt signs or symptoms of airway compromise. STG: Patient will tolerate regular diet and thin liquids without overt signs or symptoms of airway compromise. STG: Patient will tolerate mixed consistencies without overt s/sx airway compromise. SPEECH LANGUAGE PATHOLOGY: DYSPHAGIA- Initial Assessment    NAME/AGE/GENDER: Elicia Arciniega is a 72 y.o. male  DATE: 1/22/2021  PRIMARY DIAGNOSIS: COVID-19 [U07.1]      ICD-10: Treatment Diagnosis: R13.13 Dysphagia, Pharyngeal Phase    RECOMMENDATIONS   DIET:    PO:  Regular   Liquids:  regular thin     MEDICATIONS: Whole in puree  Crushed in puree  1 at a time with liquid rinse     ASPIRATION PRECAUTIONS  · Slow rate of intake  · Small bites/sips  · Upright at 90 degrees during meal     COMPENSATORY STRATEGIES/MODIFICATIONS  · No mixed consistencies  · Give plenty of liquid as wash when administering meds; otherwise float or crush in puree  · Magic mouthrinse and oral care      RECOMMENDATIONS for CONTINUED SPEECH THERAPY:   YES: Anticipate need for ongoing speech therapy during this hospitalization. ASSESSMENT   Patient presents with possible pharyngeal dysphagia. Inconsistent  cough with mixed consistency, otherwise no overt s/sx pharyngeal dysphagia and normal oral phase. Recommend regular diet/thin liquids. No mixed consistencies. Patient prefers meds 1 at a time with liquid rinse. If still noted difficulty, float or crush meds in puree. Oral care 2-3x daily and magic mouthrinse recommended. Will follow up for diet tolerance. CONTINUATION OF SKILLED SERVICES/MEDICAL NECESSITY:   Patient is expected to demonstrate progress in  diet tolerance in order to  improve swallow safety and decrease aspiration risk.  Patient continues to require skilled intervention due to possible dysphagia.    EDUCATION:  · Recommendations discussed with Patient and RN  REHABILITATION POTENTIAL FOR STATED GOALS: Excellent    PLAN    FREQUENCY/DURATION: Continue to follow patient 2 times a week for duration of hospital stay to address above goals. - Recommendations for next treatment session: Next treatment will address diet tolerance    SUBJECTIVE   Upright in bed. Pleasant. Oxygen Device: NC 3L  Pain: Pain Scale 1: Numeric (0 - 10)  Pain Intensity 1: 0  Pain Location 1: Head  Pain Intervention(s) 1: Medication (see MAR)    History of Present Injury/Illness: Mr. Fab Hobson  has a past medical history of Arthritis, Chronic pain, Diabetes ([facility] [address] Hypertension, Obesity, Other ill-defined conditions(799.89), S/P Left total knee replacement using cement (12/5/2011), S/P total knee replacement using cement (7/20/2011), and Unspecified adverse effect of anesthesia. Stafford Gallup He also  has a past surgical history that includes hx orthopaedic (July 20,2011) and colonoscopy (N/A, 2/17/2020). PRECAUTIONS/ALLERGIES: Adhesive, Other medication, Pcn [penicillins], and Pollen extracts     Problem List:  (Impairments causing functional limitations):  1. Possible dysphagia     Previous Dysphagia:reportedly difficulty taking pills whole with liquid. Patient reports difficulty when taking more than 1 pill at once and when not given enough liquid to rinse down medications with. Diet Prior to Evaluation: regular/thin liquids    Orientation:  Person  Place  Time  Situation    Cognitive-Linguistic Screening: alert, conversant. Independent at baseline. OBJECTIVE   Oral Motor:   · Labial: No impairment  · Dentition: Intact  · Oral Hygiene: oral thrush (RN notified)  · Lingual: No impairment    Swallow evaluation:   Patient self fed thin liquid via cup and straw, puree, mixed, and solid consistencies. Appropriate oral prep with all textures. Timely swallow initiation, and single swallows upon palpation. Adequate oral clearing. Serial swallows of thin liquid by straw tolerated without overt s/sx airway compromise and vocal quality clear. Patient demonstrated inconsistent mild immediate coughing with mixed consistency trials only that may be related to possible airway compromise. He does endorse sore throat. + oral thrush. (discussed with RN). Tool Used: Dysphagia Outcome and Severity Scale (RIKI)    Score Comments   Normal Diet  [] 7 With no strategies or extra time needed   Functional Swallow  [] 6 May have mild oral or pharyngeal delay   Mild Dysphagia  [] 5 Which may require one diet consistency restricted    Mild-Moderate Dysphagia  [] 4 With 1-2 diet consistencies restricted   Moderate Dysphagia  [] 3 With 2 or more diet consistencies restricted   Moderate-Severe Dysphagia  [] 2 With partial PO strategies (trials with ST only)   Severe Dysphagia  [] 1 With inability to tolerate any PO safely      Score:  Initial: 5 Most Recent: 5 (Date 01/22/21 )   Interpretation of Tool: The Dysphagia Outcome and Severity Scale (RIKI) is a simple, easy-to-use, 7-point scale developed to systematically rate the functional severity of dysphagia based on objective assessment and make recommendations for diet level, independence level, and type of nutrition. Current Medications:   No current facility-administered medications on file prior to encounter. Current Outpatient Medications on File Prior to Encounter   Medication Sig Dispense Refill    ondansetron (Zofran ODT) 4 mg disintegrating tablet Take 1 Tab by mouth every eight (8) hours as needed for Nausea. 10 Tab 0    apixaban (ELIQUIS) 5 mg tablet Take 5 mg by mouth two (2) times a day.  cetirizine (ZYRTEC) 10 mg tablet Take 10 mg by mouth daily.  bumetanide (BUMEX) 2 mg tablet Take 4 mg by mouth daily.  sacubitril-valsartan (ENTRESTO) 24 mg/26 mg tablet Take 1 Tab by mouth two (2) times a day.  eplerenone (INSPRA) 25 mg tablet Take  by mouth daily.  SITagliptin (JANUVIA) 100 mg tablet Take 100 mg by mouth daily.       metoprolol succinate (TOPROL-XL) 100 mg tablet Take 100 mg by mouth daily.  nystatin (NYSTOP) powder Apply  to affected area four (4) times daily.  pravastatin (PRAVACHOL) 40 mg tablet Take 40 mg by mouth nightly.  spironolactone (ALDACTONE) 25 mg tablet Take 12.5 mg by mouth daily.  amiodarone (CORDARONE) 200 mg tablet Take 200 mg by mouth daily.  metFORMIN (GLUCOPHAGE) 850 mg tablet Take 850 mg by mouth two (2) times daily (with meals).  omega-3 fatty acids-vitamin e (FISH OIL) 1,000 mg Cap Take 1 Cap by mouth daily (after breakfast).  Las dose 2/3/12           INTERDISCIPLINARY COLLABORATION: RN    After treatment position/precautions:  · Upright in bed  · RN notified    Total Treatment Duration:   Time In: 1235  Time Out: [address]7 [facility], [facility] [address] CCC-SLP

## 2021-01-22 NOTE — PROGRESS NOTES
spoke with patient by phone, offered support, assurance of spiritual care staff's prayers.     Claudean Musty Chaplain PRN

## 2021-01-22 NOTE — H&P
[facility]ITAL[facility] H&P/CONSULT  NAME:  Shakira Harris   Age:  72 y.o.  :   1950   MRN:   [id number]  PCP: Carl Huitron MD  Consulting MD:  Treatment Team: Attending Provider: Shonda Cooper DO; Primary Nurse: Jason Zhong RN  HPI:   Patient 25-year-old male, history significant for diabetes, hypertensions, obesity, recently diagnosed with COVID-19, lives with wife who also has COVID-19, patient started developing shortness of breath, vomiting, diarrhea,    Upon presentation, patient was found to be hypoxic. And dehydrated,  Patient received empiric steroids, nasal cannula support, and IV fluid, felt better afterwards, no further complaints at this time,  Complete ROS done and is as stated in HPI or otherwise negative  Past Medical History:   Diagnosis Date    Arthritis     OA-general/neck    Chronic pain     back    Diabetes ([facility] [address]     type 2 dx age 62-fastings avg 80; A1C-7.3 in -today  no S/S hypo    Hypertension     controlled with medication    Obesity     Other ill-defined conditions(799.89)     3 collaspsed discs- L4, L5,S1    S/P Left total knee replacement using cement 2011    S/P total knee replacement using cement 2011    right knee    Unspecified adverse effect of anesthesia     reports unable to do spinal with right knee due to collapsed discs/ no problem with GA      Past Surgical History:   Procedure Laterality Date    COLONOSCOPY N/A 2020    COLONOSCOPY performed by Octavia Watt MD at [address] ORTHOPAEDIC      replaced right TKA/ L TKA in Dec 2011       Prior to Admission Medications   Prescriptions Last Dose Informant Patient Reported? Taking? SITagliptin (JANUVIA) 100 mg tablet   Yes No   Sig: Take 100 mg by mouth daily. amiodarone (CORDARONE) 200 mg tablet   Yes No   Sig: Take 200 mg by mouth daily. apixaban (ELIQUIS) 5 mg tablet   Yes No   Sig: Take 5 mg by mouth two (2) times a day.    bumetanide (BUMEX) 2 mg tablet   Yes No   Sig: Take 4 mg by mouth daily. cetirizine (ZYRTEC) 10 mg tablet   Yes No   Sig: Take 10 mg by mouth daily. eplerenone (INSPRA) 25 mg tablet   Yes No   Sig: Take  by mouth daily. metFORMIN (GLUCOPHAGE) 850 mg tablet   Yes No   Sig: Take 850 mg by mouth two (2) times daily (with meals). metoprolol succinate (TOPROL-XL) 100 mg tablet   Yes No   Sig: Take 100 mg by mouth daily. nystatin (NYSTOP) powder   Yes No   Sig: Apply  to affected area four (4) times daily. omega-3 fatty acids-vitamin e (FISH OIL) 1,000 mg Cap   Yes No   Sig: Take 1 Cap by mouth daily (after breakfast). Las dose 2/3/12    ondansetron (Zofran ODT) 4 mg disintegrating tablet   No No   Sig: Take 1 Tab by mouth every eight (8) hours as needed for Nausea. pravastatin (PRAVACHOL) 40 mg tablet   Yes No   Sig: Take 40 mg by mouth nightly. sacubitril-valsartan (ENTRESTO) 24 mg/26 mg tablet   Yes No   Sig: Take 1 Tab by mouth two (2) times a day. spironolactone (ALDACTONE) 25 mg tablet   Yes No   Sig: Take 12.5 mg by mouth daily. Facility-Administered Medications: None     Allergies   Allergen Reactions    Adhesive Other (comments)     Redness from tegaderm     Other Medication Other (comments)     Reports swelling and shakiness with insect bites    Pcn [Penicillins] Hives    Pollen Extracts Other (comments)     Runny nose, itchy eyes      Social History     Tobacco Use    Smoking status: Never Smoker    Smokeless tobacco: Never Used   Substance Use Topics    Alcohol use: No      Family History   Problem Relation Age of Onset    Diabetes Mother     Heart Disease Father     Diabetes Sister     Diabetes Maternal Aunt        Objective:     Visit Vitals  /71   Pulse 73   Resp 19   Ht 5' 9\" (1.753 m)   Wt 95.3 kg (210 lb)   SpO2 95%   BMI 31.01 kg/m²      No data recorded.     Oxygen Therapy  O2 Sat (%): 95 % (01/21/21 1907)  Pulse via Oximetry: 79 beats per minute (01/21/21 1907)  O2 Device: Nasal cannula (01/21/21 1634)  O2 Flow Rate (L/min): 5 l/min (01/21/21 1634)  Physical Exam:  General:    Alert, cooperative, no distress, appears stated age. Head:   Normocephalic, without obvious abnormality, atraumatic. Nose:  Nares normal. No drainage or sinus tenderness. Lungs:   Clear to auscultation bilaterally. No Wheezing or Rhonchi. No rales. Heart:   Regular rate and rhythm,  no murmur, rub or gallop. Abdomen:   Soft, non-tender. Not distended. Bowel sounds normal.   Extremities: No cyanosis. No edema. No clubbing  Skin:     Texture, turgor normal. No rashes or lesions. Not Jaundiced  Neurologic: Alert and oriented x 3, no focal deficits   Data Review:   Recent Results (from the past 24 hour(s))   EKG, 12 LEAD, INITIAL    Collection Time: 01/21/21  4:40 PM   Result Value Ref Range    Ventricular Rate 80 BPM    Atrial Rate 227 BPM    QRS Duration 84 ms    Q-T Interval 354 ms    QTC Calculation (Bezet) 408 ms    Calculated R Axis 90 degrees    Calculated T Axis 125 degrees    Diagnosis       Electronic ventricular pacemaker  has replaced sinus rhythm   low voltage   Confirmed by ROSY SHAFER (), NESHA ESQUIVEL ([id number]) on 1/21/2021 6:27:29 PM     CBC WITH AUTOMATED DIFF    Collection Time: 01/21/21  4:42 PM   Result Value Ref Range    WBC 9.3 4.3 - 11.1 K/uL    RBC 6.44 (H) 4.23 - 5.6 M/uL    HGB 17.2 13.6 - 17.2 g/dL    HCT 53.9 (H) 41.1 - 50.3 %    MCV 83.7 79.6 - 97.8 FL    MCH 26.7 26.1 - 32.9 PG    MCHC 31.9 31.4 - 35.0 g/dL    RDW 19.9 (H) 11.9 - 14.6 %    PLATELET 336 271 - 749 K/uL    MPV 10.2 9.4 - 12.3 FL    ABSOLUTE NRBC 0.00 0.0 - 0.2 K/uL    DF AUTOMATED      NEUTROPHILS 86 (H) 43 - 78 %    LYMPHOCYTES 7 (L) 13 - 44 %    MONOCYTES 7 4.0 - 12.0 %    EOSINOPHILS 0 (L) 0.5 - 7.8 %    BASOPHILS 0 0.0 - 2.0 %    IMMATURE GRANULOCYTES 1 0.0 - 5.0 %    ABS. NEUTROPHILS 8.0 1.7 - 8.2 K/UL    ABS. LYMPHOCYTES 0.6 0.5 - 4.6 K/UL    ABS. MONOCYTES 0.6 0.1 - 1.3 K/UL    ABS.  EOSINOPHILS 0.0 0.0 - 0.8 K/UL ABS. BASOPHILS 0.0 0.0 - 0.2 K/UL    ABS. IMM. GRANS. 0.1 0.0 - 0.5 K/UL   METABOLIC PANEL, COMPREHENSIVE    Collection Time: 01/21/21  4:42 PM   Result Value Ref Range    Sodium 155 (H) 136 - 145 mmol/L    Potassium 4.1 3.5 - 5.1 mmol/L    Chloride 124 (H) 98 - 107 mmol/L    CO2 22 21 - 32 mmol/L    Anion gap 9 7 - 16 mmol/L    Glucose 141 (H) 65 - 100 mg/dL    BUN 55 (H) 8 - 23 MG/DL    Creatinine 2.89 (H) 0.8 - 1.5 MG/DL    GFR est AA 28 (L) >60 ml/min/1.73m2    GFR est non-AA 23 (L) >60 ml/min/1.73m2    Calcium 9.3 8.3 - 10.4 MG/DL    Bilirubin, total 1.0 0.2 - 1.1 MG/DL    ALT (SGPT) 87 (H) 12 - 65 U/L    AST (SGOT) 132 (H) 15 - 37 U/L    Alk. phosphatase 78 50 - 136 U/L    Protein, total 8.5 (H) 6.3 - 8.2 g/dL    Albumin 3.3 3.2 - 4.6 g/dL    Globulin 5.2 (H) 2.3 - 3.5 g/dL    A-G Ratio 0.6 (L) 1.2 - 3.5       Imaging /Procedures /Studies   XR CHEST PORT   Final Result   AICD without acute cardiopulmonary abnormality. Assessment and Plan: Active Hospital Problems    Diagnosis Date Noted    COVID-19 01/21/2021       Acute respiratory failure with hypoxia 2/2 Covid-19 Pneumonia  Convalescent plasma ordered--pt agreeable  Dexamethasone for 10 days  Remdesivir for 5 days  Zinc/vitamin C/vit D daily  Added IS and albuterol prn  Prone as tolerated  SSI while on steroids  Oxygen supplementation. Wean O2 as tolerated  F/u D-Dimer and other covid labs. Monitor renal and hepatic function while on remdesivir.   Empiric Rocephin's, follow procalcitonin, discontinue if indicated    Acute kidney injuryfollow urine electrolytes, gentle IV fluids, bladder scan as needed, monitor renal function    Hypernatremia most likely due to dehydration's, will hydrate with half-normal saline, monitor repeat      History of systolic heart failure, cor pulmonale, peroxisomal atrial fibrillation's, hypertensions, diabetes type 2, paroxysmal atrial fibrillation on chronic Eliquis  -Discontinue IV fluids when patient reached euvolemia,  -resume home medications if indicated ,and monitor clinically while inpatient, currently stable co morbidities add to patient's case complexity      DVT PPx: lovenox  Code Status: full     Anticipated discharge: 2-4 days, depending on patient's progression    Signed By: Paulina Collins DO     January 21, 2021

## 2021-01-22 NOTE — PROGRESS NOTES
Beside shift report received from [facility]  Patient lying in bed  Respirations present  No signs of distress  No needs expressed at this time  Safety measures in place

## 2021-01-23 PROBLEM — R65.20 SEVERE SEPSIS (HCC): Status: ACTIVE | Noted: 2021-01-23

## 2021-01-23 PROBLEM — A41.9 SEVERE SEPSIS (HCC): Status: ACTIVE | Noted: 2021-01-23

## 2021-01-23 LAB
ALBUMIN SERPL-MCNC: 2.3 G/DL (ref 3.2–4.6)
ALBUMIN/GLOB SERPL: 0.5 {RATIO} (ref 1.2–3.5)
ALP SERPL-CCNC: 67 U/L (ref 50–136)
ALT SERPL-CCNC: 102 U/L (ref 12–65)
ANION GAP SERPL CALC-SCNC: 9 MMOL/L (ref 7–16)
AST SERPL-CCNC: 124 U/L (ref 15–37)
BASOPHILS # BLD: 0 K/UL (ref 0–0.2)
BASOPHILS NFR BLD: 0 % (ref 0–2)
BILIRUB SERPL-MCNC: 0.5 MG/DL (ref 0.2–1.1)
BUN SERPL-MCNC: 61 MG/DL (ref 8–23)
CALCIUM SERPL-MCNC: 7.6 MG/DL (ref 8.3–10.4)
CHLORIDE SERPL-SCNC: 116 MMOL/L (ref 98–107)
CO2 SERPL-SCNC: 22 MMOL/L (ref 21–32)
CREAT SERPL-MCNC: 1.71 MG/DL (ref 0.8–1.5)
DIFFERENTIAL METHOD BLD: ABNORMAL
EOSINOPHIL # BLD: 0 K/UL (ref 0–0.8)
EOSINOPHIL NFR BLD: 0 % (ref 0.5–7.8)
ERYTHROCYTE [DISTWIDTH] IN BLOOD BY AUTOMATED COUNT: 18.8 % (ref 11.9–14.6)
EST. AVERAGE GLUCOSE BLD GHB EST-MCNC: 171 MG/DL
GLOBULIN SER CALC-MCNC: 4.3 G/DL (ref 2.3–3.5)
GLUCOSE BLD STRIP.AUTO-MCNC: 203 MG/DL (ref 65–100)
GLUCOSE BLD STRIP.AUTO-MCNC: 225 MG/DL (ref 65–100)
GLUCOSE BLD STRIP.AUTO-MCNC: 241 MG/DL (ref 65–100)
GLUCOSE BLD STRIP.AUTO-MCNC: 253 MG/DL (ref 65–100)
GLUCOSE SERPL-MCNC: 241 MG/DL (ref 65–100)
HBA1C MFR BLD: 7.6 % (ref 4.2–6.3)
HCT VFR BLD AUTO: 45.4 % (ref 41.1–50.3)
HGB BLD-MCNC: 14.4 G/DL (ref 13.6–17.2)
IMM GRANULOCYTES # BLD AUTO: 0.1 K/UL (ref 0–0.5)
IMM GRANULOCYTES NFR BLD AUTO: 1 % (ref 0–5)
LYMPHOCYTES # BLD: 0.6 K/UL (ref 0.5–4.6)
LYMPHOCYTES NFR BLD: 5 % (ref 13–44)
MCH RBC QN AUTO: 26.9 PG (ref 26.1–32.9)
MCHC RBC AUTO-ENTMCNC: 31.7 G/DL (ref 31.4–35)
MCV RBC AUTO: 84.9 FL (ref 79.6–97.8)
MONOCYTES # BLD: 0.3 K/UL (ref 0.1–1.3)
MONOCYTES NFR BLD: 2 % (ref 4–12)
NEUTS SEG # BLD: 11.5 K/UL (ref 1.7–8.2)
NEUTS SEG NFR BLD: 92 % (ref 43–78)
NRBC # BLD: 0 K/UL (ref 0–0.2)
PLATELET # BLD AUTO: 233 K/UL (ref 150–450)
PMV BLD AUTO: 10 FL (ref 9.4–12.3)
POTASSIUM SERPL-SCNC: 3.9 MMOL/L (ref 3.5–5.1)
PROT SERPL-MCNC: 6.6 G/DL (ref 6.3–8.2)
RBC # BLD AUTO: 5.35 M/UL (ref 4.23–5.6)
SODIUM SERPL-SCNC: 147 MMOL/L (ref 138–145)
WBC # BLD AUTO: 12.5 K/UL (ref 4.3–11.1)

## 2021-01-23 PROCEDURE — 74011250637 HC RX REV CODE- 250/637: Performed by: HOSPITALIST

## 2021-01-23 PROCEDURE — 74011250636 HC RX REV CODE- 250/636: Performed by: HOSPITALIST

## 2021-01-23 PROCEDURE — 2709999900 HC NON-CHARGEABLE SUPPLY

## 2021-01-23 PROCEDURE — 85025 COMPLETE CBC W/AUTO DIFF WBC: CPT

## 2021-01-23 PROCEDURE — 74011000258 HC RX REV CODE- 258: Performed by: HOSPITALIST

## 2021-01-23 PROCEDURE — 74011636637 HC RX REV CODE- 636/637: Performed by: HOSPITALIST

## 2021-01-23 PROCEDURE — 65270000029 HC RM PRIVATE

## 2021-01-23 PROCEDURE — 36415 COLL VENOUS BLD VENIPUNCTURE: CPT

## 2021-01-23 PROCEDURE — 80053 COMPREHEN METABOLIC PANEL: CPT

## 2021-01-23 PROCEDURE — 82962 GLUCOSE BLOOD TEST: CPT

## 2021-01-23 PROCEDURE — 74011250637 HC RX REV CODE- 250/637: Performed by: INTERNAL MEDICINE

## 2021-01-23 PROCEDURE — 83036 HEMOGLOBIN GLYCOSYLATED A1C: CPT

## 2021-01-23 RX ADMIN — INSULIN LISPRO 2 UNITS: 100 INJECTION, SOLUTION INTRAVENOUS; SUBCUTANEOUS at 17:18

## 2021-01-23 RX ADMIN — INSULIN LISPRO 5 UNITS: 100 INJECTION, SOLUTION INTRAVENOUS; SUBCUTANEOUS at 08:31

## 2021-01-23 RX ADMIN — INSULIN LISPRO 2 UNITS: 100 INJECTION, SOLUTION INTRAVENOUS; SUBCUTANEOUS at 05:58

## 2021-01-23 RX ADMIN — DEXAMETHASONE SODIUM PHOSPHATE 6 MG: 10 INJECTION INTRAMUSCULAR; INTRAVENOUS at 21:28

## 2021-01-23 RX ADMIN — INSULIN LISPRO 5 UNITS: 100 INJECTION, SOLUTION INTRAVENOUS; SUBCUTANEOUS at 12:00

## 2021-01-23 RX ADMIN — Medication 10 ML: at 06:00

## 2021-01-23 RX ADMIN — OXYCODONE HYDROCHLORIDE AND ACETAMINOPHEN 1000 MG: 500 TABLET ORAL at 08:11

## 2021-01-23 RX ADMIN — NYSTATIN: 100000 POWDER TOPICAL at 17:20

## 2021-01-23 RX ADMIN — AMIODARONE HYDROCHLORIDE 200 MG: 200 TABLET ORAL at 08:11

## 2021-01-23 RX ADMIN — ZINC SULFATE 220 MG (50 MG) CAPSULE 1 CAPSULE: CAPSULE at 08:12

## 2021-01-23 RX ADMIN — APIXABAN 5 MG: 5 TABLET, FILM COATED ORAL at 08:11

## 2021-01-23 RX ADMIN — FERROUS SULFATE TAB 325 MG (65 MG ELEMENTAL FE) 325 MG: 325 (65 FE) TAB at 17:19

## 2021-01-23 RX ADMIN — ACETAMINOPHEN 650 MG: 325 TABLET, FILM COATED ORAL at 21:26

## 2021-01-23 RX ADMIN — APIXABAN 5 MG: 5 TABLET, FILM COATED ORAL at 17:18

## 2021-01-23 RX ADMIN — METOPROLOL SUCCINATE 100 MG: 50 TABLET, EXTENDED RELEASE ORAL at 08:10

## 2021-01-23 RX ADMIN — POLYETHYLENE GLYCOL 3350 17 G: 17 POWDER, FOR SOLUTION ORAL at 08:11

## 2021-01-23 RX ADMIN — PRAVASTATIN SODIUM 40 MG: 20 TABLET ORAL at 21:26

## 2021-01-23 RX ADMIN — CEFTRIAXONE SODIUM 2 G: 2 INJECTION, POWDER, FOR SOLUTION INTRAMUSCULAR; INTRAVENOUS at 21:25

## 2021-01-23 RX ADMIN — Medication 10 ML: at 21:26

## 2021-01-23 RX ADMIN — CHOLECALCIFEROL TAB 25 MCG (1000 UNIT) 6 TABLET: 25 TAB at 08:12

## 2021-01-23 RX ADMIN — INSULIN GLARGINE 10 UNITS: 100 INJECTION, SOLUTION SUBCUTANEOUS at 21:36

## 2021-01-23 RX ADMIN — PANTOPRAZOLE SODIUM 40 MG: 40 TABLET, DELAYED RELEASE ORAL at 08:12

## 2021-01-23 RX ADMIN — INSULIN LISPRO 3 UNITS: 100 INJECTION, SOLUTION INTRAVENOUS; SUBCUTANEOUS at 21:37

## 2021-01-23 RX ADMIN — Medication 10 ML: at 14:00

## 2021-01-23 RX ADMIN — FERROUS SULFATE TAB 325 MG (65 MG ELEMENTAL FE) 325 MG: 325 (65 FE) TAB at 08:31

## 2021-01-23 RX ADMIN — INSULIN LISPRO 5 UNITS: 100 INJECTION, SOLUTION INTRAVENOUS; SUBCUTANEOUS at 17:19

## 2021-01-23 RX ADMIN — INSULIN LISPRO 2 UNITS: 100 INJECTION, SOLUTION INTRAVENOUS; SUBCUTANEOUS at 11:30

## 2021-01-23 RX ADMIN — NYSTATIN: 100000 POWDER TOPICAL at 22:00

## 2021-01-23 RX ADMIN — GUAIFENESIN AND DEXTROMETHORPHAN 5 ML: 100; 10 SYRUP ORAL at 21:26

## 2021-01-23 RX ADMIN — NYSTATIN: 100000 POWDER TOPICAL at 13:00

## 2021-01-23 RX ADMIN — NYSTATIN: 100000 POWDER TOPICAL at 08:17

## 2021-01-23 NOTE — PROGRESS NOTES
[facility] Progress Note      Assessment / Plan:    Acute respiratory failure with hypoxia 2/2 Covid-19 Pneumonia  Convalescent plasma ordered--pt agreeable  Dexamethasone for 10 days  Remdesivir for 5 days  Zinc/vitamin C/vit D daily  Added IS and albuterol prn  Prone as tolerated  SSI while on steroids  Oxygen supplementation. Wean O2 as tolerated  F/u D-Dimer and other covid labs. Monitor renal and hepatic function while on remdesivir.   Empiric Rocephin's, follow procalcitonin, discontinue if indicated  January 22 procalcitonin 0.5 5/9, equivocal, will continue Rocephin  January 23still on nasal cannula 2 L, no change, continue above treatment    Acute kidney injuryfollow urine electrolytes, gentle IV fluids, bladder scan as needed, monitor renal function  January 22renal function improving serum creatinine 2.56, continue IV fluid  January 23renal function improved to 1.7, will discontinue IV fluids as appetite is improved, monitor    Hypernatremia most likely due to dehydration's, will hydrate with half-normal saline, monitor repeat  1/22sodium improving 155, continue above   1/23-serum sodium 147    History of systolic heart failure, cor pulmonale, peroxisomal atrial fibrillation's, hypertensions, diabetes type 2, paroxysmal atrial fibrillation on chronic Eliquis  -Discontinue IV fluids when patient reached euvolemia,  -resume home medications if indicated ,and monitor clinically while inpatient, currently stable co morbidities add to patient's case complexity        DVT PPx: lovenox  Code Status: dnr/dni      Anticipated discharge: 2-4 days, depending on patient's progression       Chief Complaint : Shortness of Breath      Subjective:     No acute events overnight, patient has no new complaints today,  Cardiovascular, respiratory, GI review of system negative except mentioned above  Objective:  Visit Vitals  /68   Pulse 82   Temp 97.3 °F (36.3 °C)   Resp 18   Ht 5' 9\" (1.753 m) Wt 95.5 kg (210 lb 8.6 oz)   SpO2 94%   BMI 31.09 kg/m²                 Physical Exam:  General: No acute distress, speaking in full sentences, no use of accessory muscles   HEENT: Pupils equal and reactive to light and accommodation, oropharynx is clear   Neck: Supple, no lymphadenopathy, no JVD   Lungs: Clear to auscultation bilaterally   Cardiovascular: Regular rate and rhythm with normal S1 and S2   Abdomen: Soft, nontender, nondistended, normoactive bowel sounds   Extremities: No cyanosis clubbing or edema   Neuro: Nonfocal, A&O x3   Psych: Normal affect     Intake and Output:  Date 01/22/21 0700 - 01/23/21 0659 01/23/21 0700 - 01/24/21 0659   Shift 0305-0482 3981-0894 24 Hour Total 9560-9646 2573-3882 24 Hour Total   INTAKE   P.O. 600 240 840        P. O. 600 240 840      I. V.(mL/kg/hr)  4135. 8(5.1) 5846.8(2.6)        I.V.  5846.8 5846.8      Shift Total(mL/kg) 600(6.3) [id number]. 8(63.7) R9145306. 8(70)      OUTPUT   Urine(mL/kg/hr)           Urine Occurrence(s) 1 x 2 x 3 x 1 x  1 x   Stool           Stool Occurrence(s) 1 x 2 x 3 x      Shift Total(mL/kg)          6086.8 6686.8      Weight (kg) 95.3 95.5 95.5 95.5 95.5 95.5       LAB:  Admission on 01/21/2021   Component Date Value    WBC 01/21/2021 9.3     RBC 01/21/2021 6.44*    HGB 01/21/2021 17.2     HCT 01/21/2021 53.9*    MCV 01/21/2021 83.7     MCH 01/21/2021 26.7     MCHC 01/21/2021 31.9     RDW 01/21/2021 19.9*    PLATELET 27/70/9158 550     MPV 01/21/2021 10.2     ABSOLUTE NRBC 01/21/2021 0.00     DF 01/21/2021 AUTOMATED     NEUTROPHILS 01/21/2021 86*    LYMPHOCYTES 01/21/2021 7*    MONOCYTES 01/21/2021 7     EOSINOPHILS 01/21/2021 0*    BASOPHILS 01/21/2021 0     IMMATURE GRANULOCYTES 01/21/2021 1     ABS. NEUTROPHILS 01/21/2021 8.0     ABS. LYMPHOCYTES 01/21/2021 0.6     ABS. MONOCYTES 01/21/2021 0.6     ABS. EOSINOPHILS 01/21/2021 0.0     ABS. BASOPHILS 01/21/2021 0.0     ABS. IMM.  GRANS. 01/21/2021 0.1     Sodium 01/21/2021 155*    Potassium 01/21/2021 4.1     Chloride 01/21/2021 124*    CO2 01/21/2021 22     Anion gap 01/21/2021 9     Glucose 01/21/2021 141*    BUN 01/21/2021 55*    Creatinine 01/21/2021 2.89*    GFR est AA 01/21/2021 28*    GFR est non-AA 01/21/2021 23*    Calcium 01/21/2021 9.3     Bilirubin, total 01/21/2021 1.0     ALT (SGPT) 01/21/2021 87*    AST (SGOT) 01/21/2021 132*    Alk.  phosphatase 01/21/2021 78     Protein, total 01/21/2021 8.5*    Albumin 01/21/2021 3.3     Globulin 01/21/2021 5.2*    A-G Ratio 01/21/2021 0.6*    Ventricular Rate 01/21/2021 80     Atrial Rate 01/21/2021 227     QRS Duration 01/21/2021 84     Q-T Interval 01/21/2021 354     QTC Calculation (Bezet) 01/21/2021 408     Calculated R Axis 01/21/2021 90     Calculated T Axis 01/21/2021 125     Diagnosis 01/21/2021                      Value:Electronic ventricular pacemaker  has replaced sinus rhythm   low voltage   Confirmed by ROSY SHAFER (), NESHA ESQUIVEL ([id number]) on 1/21/2021 6:27:29 PM      Crossmatch Expiration 01/21/2021 01/24/2021,2359     ABO/Rh(D) 01/21/2021 B NEGATIVE     Antibody screen 01/21/2021 NEG     Procalcitonin 01/21/2021 0.59     Color 01/21/2021 JOSE A     Appearance 01/21/2021 CLOUDY     Specific gravity 01/21/2021 1.025*    pH (UA) 01/21/2021 5.0     Protein 01/21/2021 30*    Glucose 01/21/2021 Negative     Ketone 01/21/2021 Negative     Bilirubin 01/21/2021 SMALL*    Blood 01/21/2021 TRACE*    Urobilinogen 01/21/2021 0.2     Nitrites 01/21/2021 Negative     Leukocyte Esterase 01/21/2021 Negative     WBC 01/21/2021 0-3     RBC 01/21/2021 0-3     Bacteria 01/21/2021 TRACE     Casts 01/21/2021 HYALINE     Mucus 01/21/2021 TRACE     Other observations 01/21/2021 RESULTS VERIFIED MANUALLY     Creatinine, urine 01/21/2021 222.00     Protein, urine random 01/21/2021 105*    Sodium,urine random 01/21/2021 17     WBC 01/22/2021 8.5     RBC 01/22/2021 6.11*    HGB 01/22/2021 16.3     HCT 01/22/2021 51.8*    MCV 01/22/2021 84.8     MCH 01/22/2021 26.7     MCHC 01/22/2021 31.5     RDW 01/22/2021 20.0*    PLATELET 88/60/0112 786     MPV 01/22/2021 9.7     ABSOLUTE NRBC 01/22/2021 0.00     NEUTROPHILS 01/22/2021 90*    LYMPHOCYTES 01/22/2021 6*    MONOCYTES 01/22/2021 3*    EOSINOPHILS 01/22/2021 0*    BASOPHILS 01/22/2021 0     IMMATURE GRANULOCYTES 01/22/2021 1     ABS. NEUTROPHILS 01/22/2021 7.6     ABS. LYMPHOCYTES 01/22/2021 0.5     ABS. MONOCYTES 01/22/2021 0.3     ABS. EOSINOPHILS 01/22/2021 0.0     ABS. BASOPHILS 01/22/2021 0.0     ABS. IMM. GRANS. 01/22/2021 0.1     RBC COMMENTS 01/22/2021 NORMOCYTIC/NORMOCHROMIC     WBC COMMENTS 01/22/2021 Result Confirmed By Smear     PLATELET COMMENTS 09/80/5117 ADEQUATE     DF 01/22/2021 AUTOMATED     Sodium 01/22/2021 155*    Potassium 01/22/2021 3.8     Chloride 01/22/2021 123*    CO2 01/22/2021 21     Anion gap 01/22/2021 11     Glucose 01/22/2021 195*    BUN 01/22/2021 67*    Creatinine 01/22/2021 2.56*    GFR est AA 01/22/2021 32*    GFR est non-AA 01/22/2021 27*    Calcium 01/22/2021 8.7     Bilirubin, total 01/22/2021 0.8     ALT (SGPT) 01/22/2021 100*    AST (SGOT) 01/22/2021 181*    Alk.  phosphatase 01/22/2021 72     Protein, total 01/22/2021 7.9     Albumin 01/22/2021 2.9*    Globulin 01/22/2021 5.0*    A-G Ratio 01/22/2021 0.6*    Magnesium 01/22/2021 3.3*    Sodium 01/21/2021 159*    Potassium 01/21/2021 4.0     Chloride 01/21/2021 126*    CO2 01/21/2021 24     Anion gap 01/21/2021 9     Glucose 01/21/2021 115*    BUN 01/21/2021 60*    Creatinine 01/21/2021 2.60*    GFR est AA 01/21/2021 32*    GFR est non-AA 01/21/2021 26*    Calcium 01/21/2021 9.1     Hemoglobin A1c 01/23/2021 7.6*    Est. average glucose 01/23/2021 171     Glucose (POC) 01/22/2021 264*    Glucose (POC) 01/22/2021 213*    WBC 01/23/2021 12.5*    RBC 01/23/2021 5.35     HGB 01/23/2021 14.4     HCT 01/23/2021 45.4  MCV 01/23/2021 84.9     MCH 01/23/2021 26.9     MCHC 01/23/2021 31.7     RDW 01/23/2021 18.8*    PLATELET 23/62/2750 950     MPV 01/23/2021 10.0     ABSOLUTE NRBC 01/23/2021 0.00     DF 01/23/2021 AUTOMATED     NEUTROPHILS 01/23/2021 92*    LYMPHOCYTES 01/23/2021 5*    MONOCYTES 01/23/2021 2*    EOSINOPHILS 01/23/2021 0*    BASOPHILS 01/23/2021 0     IMMATURE GRANULOCYTES 01/23/2021 1     ABS. NEUTROPHILS 01/23/2021 11.5*    ABS. LYMPHOCYTES 01/23/2021 0.6     ABS. MONOCYTES 01/23/2021 0.3     ABS. EOSINOPHILS 01/23/2021 0.0     ABS. BASOPHILS 01/23/2021 0.0     ABS. IMM. GRANS. 01/23/2021 0.1     Sodium 01/23/2021 147*    Potassium 01/23/2021 3.9     Chloride 01/23/2021 116*    CO2 01/23/2021 22     Anion gap 01/23/2021 9     Glucose 01/23/2021 241*    BUN 01/23/2021 61*    Creatinine 01/23/2021 1.71*    GFR est AA 01/23/2021 51*    GFR est non-AA 01/23/2021 42*    Calcium 01/23/2021 7.6*    Bilirubin, total 01/23/2021 0.5     ALT (SGPT) 01/23/2021 102*    AST (SGOT) 01/23/2021 124*    Alk. phosphatase 01/23/2021 67     Protein, total 01/23/2021 6.6     Albumin 01/23/2021 2.3*    Globulin 01/23/2021 4.3*    A-G Ratio 01/23/2021 0.5*    Glucose (POC) 01/23/2021 225*    Glucose (POC) 01/23/2021 241*       IMAGING:  Xr Chest Port    Result Date: 1/21/2021  AICD without acute cardiopulmonary abnormality. Xr Chest Port    Result Date: 1/16/2021  IMPRESSION:  NO ACUTE CARDIOPULMONARY DISEASE IDENTIFIED. EKG:  No results found for this or any previous visit.           Ashley Woo DO  1/23/2021 6:38 PM

## 2021-01-23 NOTE — PROGRESS NOTES
Patient slept well throughout the night  Respirations even on 6LNCHF  There are no signs of distress at this time  Will give BSR to oncoming RN upon arrival

## 2021-01-23 NOTE — PROGRESS NOTES
Late entry: BSR received  Patient resting in bed watching tv  Respirations even on [facility]  Patient denies needs/pain at this time

## 2021-01-24 ENCOUNTER — APPOINTMENT (OUTPATIENT)
Dept: GENERAL RADIOLOGY | Age: 71
DRG: 177 | End: 2021-01-24
Attending: HOSPITALIST
Payer: MEDICARE

## 2021-01-24 LAB
ALBUMIN SERPL-MCNC: 2.3 G/DL (ref 3.2–4.6)
ALBUMIN/GLOB SERPL: 0.5 {RATIO} (ref 1.2–3.5)
ALP SERPL-CCNC: 77 U/L (ref 50–136)
ALT SERPL-CCNC: 108 U/L (ref 12–65)
ANION GAP SERPL CALC-SCNC: 5 MMOL/L (ref 7–16)
AST SERPL-CCNC: 99 U/L (ref 15–37)
BASOPHILS # BLD: 0 K/UL (ref 0–0.2)
BASOPHILS NFR BLD: 0 % (ref 0–2)
BILIRUB SERPL-MCNC: 0.4 MG/DL (ref 0.2–1.1)
BUN SERPL-MCNC: 50 MG/DL (ref 8–23)
CALCIUM SERPL-MCNC: 7.8 MG/DL (ref 8.3–10.4)
CHLORIDE SERPL-SCNC: 119 MMOL/L (ref 98–107)
CO2 SERPL-SCNC: 24 MMOL/L (ref 21–32)
CREAT SERPL-MCNC: 1.31 MG/DL (ref 0.8–1.5)
D DIMER PPP FEU-MCNC: 2.82 UG/ML(FEU)
DIFFERENTIAL METHOD BLD: ABNORMAL
EOSINOPHIL # BLD: 0 K/UL (ref 0–0.8)
EOSINOPHIL NFR BLD: 0 % (ref 0.5–7.8)
ERYTHROCYTE [DISTWIDTH] IN BLOOD BY AUTOMATED COUNT: 18.8 % (ref 11.9–14.6)
GLOBULIN SER CALC-MCNC: 4.3 G/DL (ref 2.3–3.5)
GLUCOSE BLD STRIP.AUTO-MCNC: 259 MG/DL (ref 65–100)
GLUCOSE BLD STRIP.AUTO-MCNC: 287 MG/DL (ref 65–100)
GLUCOSE BLD STRIP.AUTO-MCNC: 314 MG/DL (ref 65–100)
GLUCOSE BLD STRIP.AUTO-MCNC: 343 MG/DL (ref 65–100)
GLUCOSE SERPL-MCNC: 278 MG/DL (ref 65–100)
HCT VFR BLD AUTO: 46.2 % (ref 41.1–50.3)
HGB BLD-MCNC: 14.3 G/DL (ref 13.6–17.2)
IMM GRANULOCYTES # BLD AUTO: 0.1 K/UL (ref 0–0.5)
IMM GRANULOCYTES NFR BLD AUTO: 1 % (ref 0–5)
LYMPHOCYTES # BLD: 0.5 K/UL (ref 0.5–4.6)
LYMPHOCYTES NFR BLD: 4 % (ref 13–44)
MCH RBC QN AUTO: 26.2 PG (ref 26.1–32.9)
MCHC RBC AUTO-ENTMCNC: 31 G/DL (ref 31.4–35)
MCV RBC AUTO: 84.6 FL (ref 79.6–97.8)
MONOCYTES # BLD: 0.2 K/UL (ref 0.1–1.3)
MONOCYTES NFR BLD: 2 % (ref 4–12)
NEUTS SEG # BLD: 10.2 K/UL (ref 1.7–8.2)
NEUTS SEG NFR BLD: 93 % (ref 43–78)
NRBC # BLD: 0 K/UL (ref 0–0.2)
PLATELET # BLD AUTO: 239 K/UL (ref 150–450)
PMV BLD AUTO: 10.4 FL (ref 9.4–12.3)
POTASSIUM SERPL-SCNC: 4.6 MMOL/L (ref 3.5–5.1)
PROT SERPL-MCNC: 6.6 G/DL (ref 6.3–8.2)
RBC # BLD AUTO: 5.46 M/UL (ref 4.23–5.6)
SODIUM SERPL-SCNC: 148 MMOL/L (ref 138–145)
WBC # BLD AUTO: 11 K/UL (ref 4.3–11.1)

## 2021-01-24 PROCEDURE — 74011250636 HC RX REV CODE- 250/636: Performed by: HOSPITALIST

## 2021-01-24 PROCEDURE — 80053 COMPREHEN METABOLIC PANEL: CPT

## 2021-01-24 PROCEDURE — 94760 N-INVAS EAR/PLS OXIMETRY 1: CPT

## 2021-01-24 PROCEDURE — 74011000258 HC RX REV CODE- 258: Performed by: HOSPITALIST

## 2021-01-24 PROCEDURE — 71045 X-RAY EXAM CHEST 1 VIEW: CPT

## 2021-01-24 PROCEDURE — 74011250637 HC RX REV CODE- 250/637: Performed by: HOSPITALIST

## 2021-01-24 PROCEDURE — 2709999900 HC NON-CHARGEABLE SUPPLY

## 2021-01-24 PROCEDURE — 85025 COMPLETE CBC W/AUTO DIFF WBC: CPT

## 2021-01-24 PROCEDURE — 85379 FIBRIN DEGRADATION QUANT: CPT

## 2021-01-24 PROCEDURE — 36415 COLL VENOUS BLD VENIPUNCTURE: CPT

## 2021-01-24 PROCEDURE — 74011250637 HC RX REV CODE- 250/637: Performed by: INTERNAL MEDICINE

## 2021-01-24 PROCEDURE — 74011636637 HC RX REV CODE- 636/637: Performed by: HOSPITALIST

## 2021-01-24 PROCEDURE — 82962 GLUCOSE BLOOD TEST: CPT

## 2021-01-24 PROCEDURE — 65270000029 HC RM PRIVATE

## 2021-01-24 PROCEDURE — 77010033711 HC HIGH FLOW OXYGEN

## 2021-01-24 RX ORDER — TORSEMIDE 20 MG/1
20 TABLET ORAL DAILY
Status: DISCONTINUED | OUTPATIENT
Start: 2021-01-25 | End: 2021-02-01 | Stop reason: HOSPADM

## 2021-01-24 RX ADMIN — Medication 10 ML: at 15:48

## 2021-01-24 RX ADMIN — NYSTATIN: 100000 POWDER TOPICAL at 17:04

## 2021-01-24 RX ADMIN — PANTOPRAZOLE SODIUM 40 MG: 40 TABLET, DELAYED RELEASE ORAL at 09:15

## 2021-01-24 RX ADMIN — INSULIN LISPRO 5 UNITS: 100 INJECTION, SOLUTION INTRAVENOUS; SUBCUTANEOUS at 12:03

## 2021-01-24 RX ADMIN — FERROUS SULFATE TAB 325 MG (65 MG ELEMENTAL FE) 325 MG: 325 (65 FE) TAB at 09:15

## 2021-01-24 RX ADMIN — FERROUS SULFATE TAB 325 MG (65 MG ELEMENTAL FE) 325 MG: 325 (65 FE) TAB at 17:03

## 2021-01-24 RX ADMIN — NYSTATIN: 100000 POWDER TOPICAL at 12:08

## 2021-01-24 RX ADMIN — INSULIN GLARGINE 10 UNITS: 100 INJECTION, SOLUTION SUBCUTANEOUS at 22:00

## 2021-01-24 RX ADMIN — INSULIN LISPRO 4 UNITS: 100 INJECTION, SOLUTION INTRAVENOUS; SUBCUTANEOUS at 12:04

## 2021-01-24 RX ADMIN — APIXABAN 5 MG: 5 TABLET, FILM COATED ORAL at 09:14

## 2021-01-24 RX ADMIN — CHOLECALCIFEROL TAB 25 MCG (1000 UNIT) 6 TABLET: 25 TAB at 09:14

## 2021-01-24 RX ADMIN — METOPROLOL SUCCINATE 100 MG: 50 TABLET, EXTENDED RELEASE ORAL at 09:15

## 2021-01-24 RX ADMIN — INSULIN LISPRO 5 UNITS: 100 INJECTION, SOLUTION INTRAVENOUS; SUBCUTANEOUS at 17:04

## 2021-01-24 RX ADMIN — ZINC SULFATE 220 MG (50 MG) CAPSULE 1 CAPSULE: CAPSULE at 09:15

## 2021-01-24 RX ADMIN — NYSTATIN: 100000 POWDER TOPICAL at 09:15

## 2021-01-24 RX ADMIN — OXYCODONE HYDROCHLORIDE AND ACETAMINOPHEN 1000 MG: 500 TABLET ORAL at 09:14

## 2021-01-24 RX ADMIN — Medication 10 ML: at 22:00

## 2021-01-24 RX ADMIN — INSULIN LISPRO 3 UNITS: 100 INJECTION, SOLUTION INTRAVENOUS; SUBCUTANEOUS at 06:30

## 2021-01-24 RX ADMIN — INSULIN LISPRO 5 UNITS: 100 INJECTION, SOLUTION INTRAVENOUS; SUBCUTANEOUS at 09:16

## 2021-01-24 RX ADMIN — APIXABAN 5 MG: 5 TABLET, FILM COATED ORAL at 17:03

## 2021-01-24 RX ADMIN — NYSTATIN: 100000 POWDER TOPICAL at 22:00

## 2021-01-24 RX ADMIN — CEFTRIAXONE SODIUM 2 G: 2 INJECTION, POWDER, FOR SOLUTION INTRAMUSCULAR; INTRAVENOUS at 21:26

## 2021-01-24 RX ADMIN — PRAVASTATIN SODIUM 40 MG: 20 TABLET ORAL at 22:00

## 2021-01-24 RX ADMIN — DOXYCYCLINE 100 MG: 100 INJECTION, POWDER, LYOPHILIZED, FOR SOLUTION INTRAVENOUS at 21:00

## 2021-01-24 RX ADMIN — AMIODARONE HYDROCHLORIDE 200 MG: 200 TABLET ORAL at 09:14

## 2021-01-24 RX ADMIN — INSULIN LISPRO 4 UNITS: 100 INJECTION, SOLUTION INTRAVENOUS; SUBCUTANEOUS at 17:04

## 2021-01-24 RX ADMIN — Medication 10 ML: at 06:30

## 2021-01-24 RX ADMIN — DEXAMETHASONE SODIUM PHOSPHATE 6 MG: 10 INJECTION INTRAMUSCULAR; INTRAVENOUS at 22:00

## 2021-01-24 RX ADMIN — INSULIN LISPRO 3 UNITS: 100 INJECTION, SOLUTION INTRAVENOUS; SUBCUTANEOUS at 22:00

## 2021-01-24 RX ADMIN — POLYETHYLENE GLYCOL 3350 17 G: 17 POWDER, FOR SOLUTION ORAL at 09:15

## 2021-01-24 NOTE — PROGRESS NOTES
[facility] Progress Note      Assessment / Plan:    Acute respiratory failure with hypoxia 2/2 Covid-19 Pneumonia  Convalescent plasma ordered--pt agreeable  Dexamethasone for 10 days  Remdesivir for 5 days  Zinc/vitamin C/vit D daily  Added IS and albuterol prn  Prone as tolerated  SSI while on steroids  Oxygen supplementation. Wean O2 as tolerated  F/u D-Dimer and other covid labs. Monitor renal and hepatic function while on remdesivir.   Empiric Rocephin's, follow procalcitonin, discontinue if indicated  January 22 procalcitonin 0.5 5/9, equivocal, will continue Rocephin  January 23still on nasal cannula 2 L, no change, continue above treatment  January 24unfortunately need more oxygen today, now that 8 L, follow-up repeat chest x-ray showed worsening atypical pneumonia, already on max COVID-19 treatments, add doxycycline    Acute kidney injuryfollow urine electrolytes, gentle IV fluids, bladder scan as needed, monitor renal function  January 22renal function improving serum creatinine 2.56, continue IV fluid  January 23renal function improved to 1.7, will discontinue IV fluids as appetite is improved, monitor    Hypernatremia most likely due to dehydration's, will hydrate with half-normal saline, monitor repeat  1/22sodium improving 155, continue above   1/23-serum sodium 147    History of systolic heart failure, cor pulmonale, peroxisomal atrial fibrillation's, hypertensions, diabetes type 2, paroxysmal atrial fibrillation on chronic Eliquis  -Discontinue IV fluids when patient reached euvolemia,  -resume home medications if indicated ,and monitor clinically while inpatient, currently stable co morbidities add to patient's case complexity        DVT PPx: lovenox  Code Status: dnr/dni      Anticipated discharge: 2-4 days, depending on patient's progression       Chief Complaint : Shortness of Breath      Subjective:     No acute events overnight, patient has no new complaints today,  Cardiovascular, respiratory, GI review of system negative except mentioned above  Objective:  Visit Vitals  /71 (BP 1 Location: Right arm, BP Patient Position: At rest)   Pulse 80   Temp 97.4 °F (36.3 °C)   Resp 19   Ht 5' 9\" (1.753 m)   Wt 94.9 kg (209 lb 3.5 oz)   SpO2 92%   BMI 30.90 kg/m²                 Physical Exam:  General: No acute distress, speaking in full sentences, no use of accessory muscles   HEENT: Pupils equal and reactive to light and accommodation, oropharynx is clear   Neck: Supple, no lymphadenopathy, no JVD   Lungs: Clear to auscultation bilaterally   Cardiovascular: Regular rate and rhythm with normal S1 and S2   Abdomen: Soft, nontender, nondistended, normoactive bowel sounds   Extremities: No cyanosis clubbing or edema   Neuro: Nonfocal, A&O x3   Psych: Normal affect     Intake and Output:  Date 01/23/21 0700 - 01/24/21 0659 01/24/21 0700 - 01/25/21 0659   Shift 0345-9983 6879-4657 24 Hour Total 5960-7159 6207-1765 24 Hour Total   INTAKE   Shift Total(mL/kg)         OUTPUT   Urine(mL/kg/hr)           Urine Occurrence(s) 2 x  2 x      Stool           Stool Occurrence(s) 1 x  1 x      Shift Total(mL/kg)         NET         Weight (kg) 95.5 94.9 94.9 94.9 94.9 94.9       LAB:  No results displayed because visit has over 200 results. IMAGING:  Xr Chest Sngl V    Result Date: 1/24/2021  1. Interval development of peripheral and basilar predominant reticular lung markings suspicious for atypical pneumonia. Xr Chest Port    Result Date: 1/21/2021  AICD without acute cardiopulmonary abnormality. EKG:  No results found for this or any previous visit.           Carlos Joy DO  1/24/2021 6:38 PM

## 2021-01-24 NOTE — PROGRESS NOTES
[facility] from [facility]. Patient in stable condition at this time. Safety measures in place. No S/Sx of distress. Respirations even and unlabored.  Call light within reach and patient encouraged to call nurse prn assist.

## 2021-01-25 LAB
ALBUMIN SERPL-MCNC: 2.4 G/DL (ref 3.2–4.6)
ALBUMIN/GLOB SERPL: 0.6 {RATIO} (ref 1.2–3.5)
ALP SERPL-CCNC: 83 U/L (ref 50–136)
ALT SERPL-CCNC: 112 U/L (ref 12–65)
ANION GAP SERPL CALC-SCNC: 6 MMOL/L (ref 7–16)
AST SERPL-CCNC: 60 U/L (ref 15–37)
BASOPHILS # BLD: 0 K/UL (ref 0–0.2)
BASOPHILS NFR BLD: 0 % (ref 0–2)
BILIRUB SERPL-MCNC: 0.4 MG/DL (ref 0.2–1.1)
BUN SERPL-MCNC: 40 MG/DL (ref 8–23)
CALCIUM SERPL-MCNC: 8.1 MG/DL (ref 8.3–10.4)
CHLORIDE SERPL-SCNC: 116 MMOL/L (ref 98–107)
CO2 SERPL-SCNC: 24 MMOL/L (ref 21–32)
CREAT SERPL-MCNC: 1.15 MG/DL (ref 0.8–1.5)
CRP SERPL HS-MCNC: 12.6 MG/L
D DIMER PPP FEU-MCNC: 3.09 UG/ML(FEU)
DIFFERENTIAL METHOD BLD: ABNORMAL
EOSINOPHIL # BLD: 0 K/UL (ref 0–0.8)
EOSINOPHIL NFR BLD: 0 % (ref 0.5–7.8)
ERYTHROCYTE [DISTWIDTH] IN BLOOD BY AUTOMATED COUNT: 18.7 % (ref 11.9–14.6)
GLOBULIN SER CALC-MCNC: 3.8 G/DL (ref 2.3–3.5)
GLUCOSE BLD STRIP.AUTO-MCNC: 312 MG/DL (ref 65–100)
GLUCOSE BLD STRIP.AUTO-MCNC: 326 MG/DL (ref 65–100)
GLUCOSE BLD STRIP.AUTO-MCNC: 359 MG/DL (ref 65–100)
GLUCOSE BLD STRIP.AUTO-MCNC: 385 MG/DL (ref 65–100)
GLUCOSE SERPL-MCNC: 336 MG/DL (ref 65–100)
HCT VFR BLD AUTO: 46.2 % (ref 41.1–50.3)
HGB BLD-MCNC: 14.6 G/DL (ref 13.6–17.2)
IMM GRANULOCYTES # BLD AUTO: 0.1 K/UL (ref 0–0.5)
IMM GRANULOCYTES NFR BLD AUTO: 1 % (ref 0–5)
LYMPHOCYTES # BLD: 0.4 K/UL (ref 0.5–4.6)
LYMPHOCYTES NFR BLD: 3 % (ref 13–44)
MCH RBC QN AUTO: 26.4 PG (ref 26.1–32.9)
MCHC RBC AUTO-ENTMCNC: 31.6 G/DL (ref 31.4–35)
MCV RBC AUTO: 83.7 FL (ref 79.6–97.8)
MONOCYTES # BLD: 0.3 K/UL (ref 0.1–1.3)
MONOCYTES NFR BLD: 3 % (ref 4–12)
NEUTS SEG # BLD: 9.7 K/UL (ref 1.7–8.2)
NEUTS SEG NFR BLD: 93 % (ref 43–78)
NRBC # BLD: 0 K/UL (ref 0–0.2)
PLATELET # BLD AUTO: 259 K/UL (ref 150–450)
PMV BLD AUTO: 10.3 FL (ref 9.4–12.3)
POTASSIUM SERPL-SCNC: 4.6 MMOL/L (ref 3.5–5.1)
PROCALCITONIN SERPL-MCNC: 0.16 NG/ML
PROT SERPL-MCNC: 6.2 G/DL (ref 6.3–8.2)
RBC # BLD AUTO: 5.52 M/UL (ref 4.23–5.6)
SODIUM SERPL-SCNC: 146 MMOL/L (ref 138–145)
WBC # BLD AUTO: 10.5 K/UL (ref 4.3–11.1)

## 2021-01-25 PROCEDURE — 74011250637 HC RX REV CODE- 250/637: Performed by: INTERNAL MEDICINE

## 2021-01-25 PROCEDURE — 74011250637 HC RX REV CODE- 250/637: Performed by: HOSPITALIST

## 2021-01-25 PROCEDURE — 86141 C-REACTIVE PROTEIN HS: CPT

## 2021-01-25 PROCEDURE — 86580 TB INTRADERMAL TEST: CPT | Performed by: HOSPITALIST

## 2021-01-25 PROCEDURE — 85379 FIBRIN DEGRADATION QUANT: CPT

## 2021-01-25 PROCEDURE — 84145 PROCALCITONIN (PCT): CPT

## 2021-01-25 PROCEDURE — 80053 COMPREHEN METABOLIC PANEL: CPT

## 2021-01-25 PROCEDURE — 74011000250 HC RX REV CODE- 250: Performed by: HOSPITALIST

## 2021-01-25 PROCEDURE — 36415 COLL VENOUS BLD VENIPUNCTURE: CPT

## 2021-01-25 PROCEDURE — 77010033711 HC HIGH FLOW OXYGEN

## 2021-01-25 PROCEDURE — 74011000302 HC RX REV CODE- 302: Performed by: HOSPITALIST

## 2021-01-25 PROCEDURE — 94760 N-INVAS EAR/PLS OXIMETRY 1: CPT

## 2021-01-25 PROCEDURE — 85025 COMPLETE CBC W/AUTO DIFF WBC: CPT

## 2021-01-25 PROCEDURE — 82962 GLUCOSE BLOOD TEST: CPT

## 2021-01-25 PROCEDURE — 65270000029 HC RM PRIVATE

## 2021-01-25 PROCEDURE — XW033E5 INTRODUCTION OF REMDESIVIR ANTI-INFECTIVE INTO PERIPHERAL VEIN, PERCUTANEOUS APPROACH, NEW TECHNOLOGY GROUP 5: ICD-10-PCS | Performed by: HOSPITALIST

## 2021-01-25 PROCEDURE — 77030040830 HC CATH URETH FOL MDII -A

## 2021-01-25 PROCEDURE — 74011250636 HC RX REV CODE- 250/636: Performed by: HOSPITALIST

## 2021-01-25 PROCEDURE — 92526 ORAL FUNCTION THERAPY: CPT

## 2021-01-25 PROCEDURE — 2709999900 HC NON-CHARGEABLE SUPPLY

## 2021-01-25 PROCEDURE — 74011000258 HC RX REV CODE- 258: Performed by: HOSPITALIST

## 2021-01-25 PROCEDURE — 74011636637 HC RX REV CODE- 636/637: Performed by: HOSPITALIST

## 2021-01-25 RX ORDER — INSULIN GLARGINE 100 [IU]/ML
20 INJECTION, SOLUTION SUBCUTANEOUS
Status: DISCONTINUED | OUTPATIENT
Start: 2021-01-25 | End: 2021-01-26

## 2021-01-25 RX ORDER — INSULIN LISPRO 100 [IU]/ML
7 INJECTION, SOLUTION INTRAVENOUS; SUBCUTANEOUS
Status: DISCONTINUED | OUTPATIENT
Start: 2021-01-25 | End: 2021-01-29

## 2021-01-25 RX ORDER — INSULIN LISPRO 100 [IU]/ML
INJECTION, SOLUTION INTRAVENOUS; SUBCUTANEOUS
Status: DISCONTINUED | OUTPATIENT
Start: 2021-01-25 | End: 2021-02-01 | Stop reason: HOSPADM

## 2021-01-25 RX ORDER — SODIUM CHLORIDE 9 MG/ML
50 INJECTION, SOLUTION INTRAVENOUS EVERY 24 HOURS
Status: COMPLETED | OUTPATIENT
Start: 2021-01-25 | End: 2021-01-29

## 2021-01-25 RX ORDER — INSULIN GLARGINE 100 [IU]/ML
16 INJECTION, SOLUTION SUBCUTANEOUS
Status: DISCONTINUED | OUTPATIENT
Start: 2021-01-25 | End: 2021-01-25

## 2021-01-25 RX ADMIN — INSULIN LISPRO 4 UNITS: 100 INJECTION, SOLUTION INTRAVENOUS; SUBCUTANEOUS at 06:00

## 2021-01-25 RX ADMIN — PHENOL 1 SPRAY: 1.5 LIQUID ORAL at 14:25

## 2021-01-25 RX ADMIN — Medication 10 ML: at 22:00

## 2021-01-25 RX ADMIN — PANTOPRAZOLE SODIUM 40 MG: 40 TABLET, DELAYED RELEASE ORAL at 09:50

## 2021-01-25 RX ADMIN — OXYCODONE HYDROCHLORIDE AND ACETAMINOPHEN 1000 MG: 500 TABLET ORAL at 09:50

## 2021-01-25 RX ADMIN — INSULIN LISPRO 10 UNITS: 100 INJECTION, SOLUTION INTRAVENOUS; SUBCUTANEOUS at 17:32

## 2021-01-25 RX ADMIN — Medication 10 ML: at 06:00

## 2021-01-25 RX ADMIN — NYSTATIN: 100000 POWDER TOPICAL at 12:05

## 2021-01-25 RX ADMIN — POLYETHYLENE GLYCOL 3350 17 G: 17 POWDER, FOR SOLUTION ORAL at 09:50

## 2021-01-25 RX ADMIN — REMDESIVIR 200 MG: 100 INJECTION, POWDER, LYOPHILIZED, FOR SOLUTION INTRAVENOUS at 11:57

## 2021-01-25 RX ADMIN — INSULIN GLARGINE 20 UNITS: 100 INJECTION, SOLUTION SUBCUTANEOUS at 22:00

## 2021-01-25 RX ADMIN — FERROUS SULFATE TAB 325 MG (65 MG ELEMENTAL FE) 325 MG: 325 (65 FE) TAB at 17:33

## 2021-01-25 RX ADMIN — INSULIN LISPRO 5 UNITS: 100 INJECTION, SOLUTION INTRAVENOUS; SUBCUTANEOUS at 11:58

## 2021-01-25 RX ADMIN — NYSTATIN: 100000 POWDER TOPICAL at 09:50

## 2021-01-25 RX ADMIN — CEFTRIAXONE SODIUM 2 G: 2 INJECTION, POWDER, FOR SOLUTION INTRAMUSCULAR; INTRAVENOUS at 21:26

## 2021-01-25 RX ADMIN — DEXAMETHASONE SODIUM PHOSPHATE 6 MG: 10 INJECTION INTRAMUSCULAR; INTRAVENOUS at 23:00

## 2021-01-25 RX ADMIN — INSULIN LISPRO 7 UNITS: 100 INJECTION, SOLUTION INTRAVENOUS; SUBCUTANEOUS at 17:34

## 2021-01-25 RX ADMIN — APIXABAN 5 MG: 5 TABLET, FILM COATED ORAL at 17:33

## 2021-01-25 RX ADMIN — INSULIN LISPRO 5 UNITS: 100 INJECTION, SOLUTION INTRAVENOUS; SUBCUTANEOUS at 08:56

## 2021-01-25 RX ADMIN — PRAVASTATIN SODIUM 40 MG: 20 TABLET ORAL at 22:00

## 2021-01-25 RX ADMIN — METOPROLOL SUCCINATE 100 MG: 50 TABLET, EXTENDED RELEASE ORAL at 09:50

## 2021-01-25 RX ADMIN — NYSTATIN: 100000 POWDER TOPICAL at 22:00

## 2021-01-25 RX ADMIN — INSULIN LISPRO 5 UNITS: 100 INJECTION, SOLUTION INTRAVENOUS; SUBCUTANEOUS at 11:57

## 2021-01-25 RX ADMIN — APIXABAN 5 MG: 5 TABLET, FILM COATED ORAL at 09:50

## 2021-01-25 RX ADMIN — FERROUS SULFATE TAB 325 MG (65 MG ELEMENTAL FE) 325 MG: 325 (65 FE) TAB at 09:50

## 2021-01-25 RX ADMIN — AMIODARONE HYDROCHLORIDE 200 MG: 200 TABLET ORAL at 11:02

## 2021-01-25 RX ADMIN — INSULIN LISPRO 8 UNITS: 100 INJECTION, SOLUTION INTRAVENOUS; SUBCUTANEOUS at 22:00

## 2021-01-25 RX ADMIN — Medication 10 ML: at 14:26

## 2021-01-25 RX ADMIN — TUBERCULIN PURIFIED PROTEIN DERIVATIVE 5 UNITS: 5 INJECTION, SOLUTION INTRADERMAL at 16:58

## 2021-01-25 RX ADMIN — DOXYCYCLINE 100 MG: 100 INJECTION, POWDER, LYOPHILIZED, FOR SOLUTION INTRAVENOUS at 21:00

## 2021-01-25 RX ADMIN — ZINC SULFATE 220 MG (50 MG) CAPSULE 1 CAPSULE: CAPSULE at 09:50

## 2021-01-25 RX ADMIN — SODIUM CHLORIDE 50 ML: 900 INJECTION, SOLUTION INTRAVENOUS at 12:00

## 2021-01-25 RX ADMIN — DOXYCYCLINE 100 MG: 100 INJECTION, POWDER, LYOPHILIZED, FOR SOLUTION INTRAVENOUS at 09:53

## 2021-01-25 RX ADMIN — NYSTATIN: 100000 POWDER TOPICAL at 17:33

## 2021-01-25 RX ADMIN — CHOLECALCIFEROL TAB 25 MCG (1000 UNIT) 6 TABLET: 25 TAB at 09:49

## 2021-01-25 RX ADMIN — TORSEMIDE 20 MG: 20 TABLET ORAL at 09:50

## 2021-01-25 NOTE — PROGRESS NOTES
Late entry: BSR received  Patient resting in bed watching tv  Respirations even on 8LNCHF with no signs of distress  Patient denies needs/pain at this time

## 2021-01-25 NOTE — PROGRESS NOTES
reviewed notes for any spiritual concerns   none noted   staff continues to give very compassionate care

## 2021-01-25 NOTE — PROGRESS NOTES
[facility] Progress Note      Assessment / Plan:    Acute respiratory failure with hypoxia 2/2 Covid-19 Pneumonia  Convalescent plasma ordered--pt agreeable  Dexamethasone for 10 days  Remdesivir for 5 days  Zinc/vitamin C/vit D daily  Added IS and albuterol prn  Prone as tolerated  SSI while on steroids  Oxygen supplementation. Wean O2 as tolerated  F/u D-Dimer and other covid labs. Monitor renal and hepatic function while on remdesivir.   Empiric Rocephin's, follow procalcitonin, discontinue if indicated  January 22 procalcitonin 0.5 5/9, equivocal, will continue Rocephin  January 23still on nasal cannula 2 L, no change, continue above treatment  January 24unfortunately need more oxygen today, now that 8 L, follow-up repeat chest x-ray showed worsening atypical pneumonia, already on max COVID-19 treatments, add doxycycline  January 25unchanged, still on 8 L, continue treatment    Diabetes type 2, hyperglycemia contributed by dexamethasone use  Jan 25fasting glucose greater than 250, increase Lantus to 20 units nightly, increase Premeal insulin to 7 units, continue gentle insulin sliding scale    Acute kidney injuryfollow urine electrolytes, gentle IV fluids, bladder scan as needed, monitor renal function  January 22renal function improving serum creatinine 2.56, continue IV fluid  January 23renal function improved to 1.7, will discontinue IV fluids as appetite is improved, monitor    Hypernatremia most likely due to dehydration's, will hydrate with half-normal saline, monitor repeat  1/22sodium improving 155, continue above   1/23-serum sodium 147    History of systolic heart failure, cor pulmonale, peroxisomal atrial fibrillation's, hypertensions, diabetes type 2, paroxysmal atrial fibrillation on chronic Eliquis  -Discontinue IV fluids when patient reached euvolemia,  -resume home medications if indicated ,and monitor clinically while inpatient, currently stable co morbidities add to patient's case complexity        DVT PPx: lovenox  Code Status: dnr/dni      Anticipated discharge: 2-4 days, depending on patient's progression       Chief Complaint : Shortness of Breath      Subjective:     No acute events overnight, patient has no new complaints today,  Cardiovascular, respiratory, GI review of system negative except mentioned above  Objective:  Visit Vitals  /77   Pulse 83   Temp 97.7 °F (36.5 °C)   Resp 19   Ht 5' 9\" (1.753 m)   Wt 94.9 kg (209 lb 3.5 oz)   SpO2 92%   BMI 30.90 kg/m²                 Physical Exam:  General: No acute distress, speaking in full sentences, no use of accessory muscles   HEENT: Pupils equal and reactive to light and accommodation, oropharynx is clear   Neck: Supple, no lymphadenopathy, no JVD   Lungs: Clear to auscultation bilaterally   Cardiovascular: Regular rate and rhythm with normal S1 and S2   Abdomen: Soft, nontender, nondistended, normoactive bowel sounds   Extremities: No cyanosis clubbing or edema   Neuro: Nonfocal, A&O x3   Psych: Normal affect     Intake and Output:  Date 01/24/21 0700 - 01/25/21 0659 01/25/21 0700 - 01/26/21 0659   Shift 9954-8830 0869-6255 24 Hour Total 3543-3990 1547-4717 24 Hour Total   INTAKE   I.V.(mL/kg/hr)    0  0     I.V.    0  0   Shift Total(mL/kg)    0(0)  0(0)   OUTPUT   Urine(mL/kg/hr)           Urine Occurrence(s)    1 x  1 x   Shift Total(mL/kg)         NET    0  0   Weight (kg) 94.9 94.9 94.9 94.9 94.9 94.9       LAB:  No results displayed because visit has over 200 results. IMAGING:  Xr Chest Sngl V    Result Date: 1/24/2021  1. Interval development of peripheral and basilar predominant reticular lung markings suspicious for atypical pneumonia. Xr Chest Port    Result Date: 1/21/2021  AICD without acute cardiopulmonary abnormality. EKG:  No results found for this or any previous visit.           Leodan Mcelroy DO  1/25/2021 6:38 PM

## 2021-01-25 NOTE — PROGRESS NOTES
Patient's 02 requirement has increased. Patient is not medically stable for discharge. CM following.

## 2021-01-25 NOTE — PROGRESS NOTES
LTG: Patient will tolerate least restrictive diet without overt signs or symptoms of airway compromise. MET 01/25/21  STG: Patient will tolerate regular diet and thin liquids without overt signs or symptoms of airway compromise. MET 01/25/21  STG: Patient will tolerate mixed consistencies without overt s/sx airway compromise. MET 01/25/21    SPEECH LANGUAGE PATHOLOGY: DYSPHAGIA- Daily Note and Discharge    NAME/AGE/GENDER: Dustin Albert is a 72 y.o. male  DATE: 1/25/2021  PRIMARY DIAGNOSIS: COVID-19 [U07.1]      ICD-10: Treatment Diagnosis: R13.13 Dysphagia, Pharyngeal Phase    RECOMMENDATIONS   DIET:    PO:  Regular   Liquids:  regular thin     MEDICATIONS: 1 at a time with liquid rinse     ASPIRATION PRECAUTIONS  · Slow rate of intake  · Small bites/sips  · Upright at 90 degrees during meal     COMPENSATORY STRATEGIES/MODIFICATIONS  · Give plenty of liquid as wash when administering meds  · Chloraseptic spray for sore throat     RECOMMENDATIONS for CONTINUED SPEECH THERAPY:   No further speech therapy indicated at this time. ASSESSMENT   Patient presents oropharyngeal swallow function that is within normal limits. No s/sx of airway compromise with thin liquids, mixed consistencies, solid textures, or medications (one at a time with liquid wash). Recommend continue current diet. Medications 1 at a time per patient preference. Consider chloraseptic spray to assist with sore throat. No additional speech therapy indicated a this time. CONTINUATION OF SKILLED SERVICES/MEDICAL NECESSITY:   Patient is expected to demonstrate progress in  diet tolerance in order to  improve swallow safety and decrease aspiration risk.  Patient continues to require skilled intervention due to possible dysphagia.    EDUCATION:  · Recommendations discussed with Patient and RN  REHABILITATION POTENTIAL FOR STATED GOALS: Excellent    PLAN    FREQUENCY/DURATION: Continue to follow patient 2 times a week for duration of hospital stay to address above goals. - Recommendations for next treatment session: Next treatment will address diet tolerance    SUBJECTIVE   Pleasant. Reports ongoing sore throat. C/o medications \"hanging up\". Oxygen Device: NC 3L  Pain: Pain Scale 1: Numeric (0 - 10)  Pain Intensity 1: 0    Problem List:  (Impairments causing functional limitations):  1. Possible dysphagia     Orientation:  Person  Place  Time  Situation    OBJECTIVE   Swallow treatment:   Patient with ongoing c/o painful swallow/sore throat. Agreeable to sips of thin liquids. Serial straw sips (total of 12 ounces) consumed without s/sx of airway compromise. He self-fed mixed consistencies and solid textures without difficulty. Medications consumed with RN supervision. He initially attempted to take several at a time. C/o of pill \"hanging up\" and he required large amount of thin liquids to clear. All additional medications consumed 1 with liquid wash without difficulty. Tool Used: Dysphagia Outcome and Severity Scale (RIKI)    Score Comments   Normal Diet  [] 7 With no strategies or extra time needed   Functional Swallow  [] 6 May have mild oral or pharyngeal delay   Mild Dysphagia  [] 5 Which may require one diet consistency restricted    Mild-Moderate Dysphagia  [] 4 With 1-2 diet consistencies restricted   Moderate Dysphagia  [] 3 With 2 or more diet consistencies restricted   Moderate-Severe Dysphagia  [] 2 With partial PO strategies (trials with ST only)   Severe Dysphagia  [] 1 With inability to tolerate any PO safely      Score:  Initial: 5 Most Recent: 6 (Date 01/25/21 )   Interpretation of Tool: The Dysphagia Outcome and Severity Scale (RIKI) is a simple, easy-to-use, 7-point scale developed to systematically rate the functional severity of dysphagia based on objective assessment and make recommendations for diet level, independence level, and type of nutrition.        Current Medications:   No current facility-administered medications on file prior to encounter. Current Outpatient Medications on File Prior to Encounter   Medication Sig Dispense Refill    empagliflozin (Jardiance) 10 mg tablet Take 10 mg by mouth daily.  metFORMIN ER (glucophage XR) 500 mg tablet Take 1,000 mg by mouth daily (with dinner).  torsemide (DEMADEX) 100 mg tablet Take 50 mg by mouth daily. Patient takes 1/2 of 100mg tablet for 50mg      insulin glargine (LANTUS) 100 unit/mL injection by SubCUTAneous route nightly. Patient takes \"6 units in the morning and 24 units at night depending on blood sugar\"      magnesium oxide (MAG-OX) 400 mg tablet Take 400 mg by mouth two (2) times a day.  potassium chloride SR (K-TAB) 20 mEq tablet Take 20 mEq by mouth daily.  ferrous sulfate (IRON) 325 mg (65 mg iron) EC tablet Take 325 mg by mouth three (3) times daily (with meals).  pantoprazole (Protonix) 40 mg tablet Take 40 mg by mouth daily.  ondansetron (Zofran ODT) 4 mg disintegrating tablet Take 1 Tab by mouth every eight (8) hours as needed for Nausea. 10 Tab 0    apixaban (ELIQUIS) 5 mg tablet Take 5 mg by mouth two (2) times a day.  cetirizine (ZYRTEC) 10 mg tablet Take 10 mg by mouth daily.  sacubitril-valsartan (ENTRESTO) 24 mg/26 mg tablet Take 1 Tab by mouth two (2) times a day.  eplerenone (INSPRA) 25 mg tablet Take  by mouth daily.  SITagliptin (JANUVIA) 100 mg tablet Take 100 mg by mouth daily.  metoprolol succinate (TOPROL-XL) 100 mg tablet Take 100 mg by mouth daily.  nystatin (NYSTOP) powder Apply  to affected area four (4) times daily.  pravastatin (PRAVACHOL) 40 mg tablet Take 40 mg by mouth nightly.  amiodarone (CORDARONE) 200 mg tablet Take 200 mg by mouth daily.           INTERDISCIPLINARY COLLABORATION: RN    After treatment position/precautions:  · Upright in bed  · Rn at bedside    Total Treatment Duration:   Time In: 1102  Time Out: [address], MSP, CCC-SLP

## 2021-01-26 LAB
ALBUMIN SERPL-MCNC: 2.4 G/DL (ref 3.2–4.6)
ALBUMIN/GLOB SERPL: 0.5 {RATIO} (ref 1.2–3.5)
ALP SERPL-CCNC: 89 U/L (ref 50–136)
ALT SERPL-CCNC: 128 U/L (ref 12–65)
ANION GAP SERPL CALC-SCNC: 9 MMOL/L (ref 7–16)
AST SERPL-CCNC: 62 U/L (ref 15–37)
BASOPHILS # BLD: 0 K/UL (ref 0–0.2)
BASOPHILS NFR BLD: 0 % (ref 0–2)
BILIRUB SERPL-MCNC: 0.4 MG/DL (ref 0.2–1.1)
BUN SERPL-MCNC: 49 MG/DL (ref 8–23)
CALCIUM SERPL-MCNC: 8.8 MG/DL (ref 8.3–10.4)
CHLORIDE SERPL-SCNC: 112 MMOL/L (ref 98–107)
CO2 SERPL-SCNC: 22 MMOL/L (ref 21–32)
CREAT SERPL-MCNC: 1.3 MG/DL (ref 0.8–1.5)
D DIMER PPP FEU-MCNC: 3.43 UG/ML(FEU)
DIFFERENTIAL METHOD BLD: ABNORMAL
EOSINOPHIL # BLD: 0 K/UL (ref 0–0.8)
EOSINOPHIL NFR BLD: 0 % (ref 0.5–7.8)
ERYTHROCYTE [DISTWIDTH] IN BLOOD BY AUTOMATED COUNT: 18.3 % (ref 11.9–14.6)
GLOBULIN SER CALC-MCNC: 4.4 G/DL (ref 2.3–3.5)
GLUCOSE BLD STRIP.AUTO-MCNC: 232 MG/DL (ref 65–100)
GLUCOSE BLD STRIP.AUTO-MCNC: 288 MG/DL (ref 65–100)
GLUCOSE BLD STRIP.AUTO-MCNC: 298 MG/DL (ref 65–100)
GLUCOSE BLD STRIP.AUTO-MCNC: 315 MG/DL (ref 65–100)
GLUCOSE SERPL-MCNC: 317 MG/DL (ref 65–100)
HCT VFR BLD AUTO: 48.4 % (ref 41.1–50.3)
HGB BLD-MCNC: 15.5 G/DL (ref 13.6–17.2)
IMM GRANULOCYTES # BLD AUTO: 0.2 K/UL (ref 0–0.5)
IMM GRANULOCYTES NFR BLD AUTO: 1 % (ref 0–5)
LYMPHOCYTES # BLD: 0.4 K/UL (ref 0.5–4.6)
LYMPHOCYTES NFR BLD: 3 % (ref 13–44)
MCH RBC QN AUTO: 26.8 PG (ref 26.1–32.9)
MCHC RBC AUTO-ENTMCNC: 32 G/DL (ref 31.4–35)
MCV RBC AUTO: 83.7 FL (ref 79.6–97.8)
MM INDURATION POC: 0 MM (ref 0–5)
MONOCYTES # BLD: 0.3 K/UL (ref 0.1–1.3)
MONOCYTES NFR BLD: 3 % (ref 4–12)
NEUTS SEG # BLD: 10.3 K/UL (ref 1.7–8.2)
NEUTS SEG NFR BLD: 92 % (ref 43–78)
NRBC # BLD: 0 K/UL (ref 0–0.2)
PLATELET # BLD AUTO: 248 K/UL (ref 150–450)
PMV BLD AUTO: 10.1 FL (ref 9.4–12.3)
POTASSIUM SERPL-SCNC: 4.5 MMOL/L (ref 3.5–5.1)
PPD POC: NEGATIVE NEGATIVE
PROT SERPL-MCNC: 6.8 G/DL (ref 6.3–8.2)
RBC # BLD AUTO: 5.78 M/UL (ref 4.23–5.6)
SODIUM SERPL-SCNC: 143 MMOL/L (ref 138–145)
WBC # BLD AUTO: 11.2 K/UL (ref 4.3–11.1)

## 2021-01-26 PROCEDURE — 74011000258 HC RX REV CODE- 258: Performed by: HOSPITALIST

## 2021-01-26 PROCEDURE — 85025 COMPLETE CBC W/AUTO DIFF WBC: CPT

## 2021-01-26 PROCEDURE — 74011250637 HC RX REV CODE- 250/637: Performed by: HOSPITALIST

## 2021-01-26 PROCEDURE — 36415 COLL VENOUS BLD VENIPUNCTURE: CPT

## 2021-01-26 PROCEDURE — 80053 COMPREHEN METABOLIC PANEL: CPT

## 2021-01-26 PROCEDURE — 74011250637 HC RX REV CODE- 250/637: Performed by: INTERNAL MEDICINE

## 2021-01-26 PROCEDURE — 74011636637 HC RX REV CODE- 636/637: Performed by: HOSPITALIST

## 2021-01-26 PROCEDURE — 97530 THERAPEUTIC ACTIVITIES: CPT | Performed by: PHYSICAL THERAPIST

## 2021-01-26 PROCEDURE — 2709999900 HC NON-CHARGEABLE SUPPLY

## 2021-01-26 PROCEDURE — 82962 GLUCOSE BLOOD TEST: CPT

## 2021-01-26 PROCEDURE — 85379 FIBRIN DEGRADATION QUANT: CPT

## 2021-01-26 PROCEDURE — 65270000029 HC RM PRIVATE

## 2021-01-26 PROCEDURE — 74011250636 HC RX REV CODE- 250/636: Performed by: HOSPITALIST

## 2021-01-26 PROCEDURE — 74011000250 HC RX REV CODE- 250: Performed by: HOSPITALIST

## 2021-01-26 RX ORDER — INSULIN GLARGINE 100 [IU]/ML
20 INJECTION, SOLUTION SUBCUTANEOUS EVERY 12 HOURS
Status: DISCONTINUED | OUTPATIENT
Start: 2021-01-26 | End: 2021-01-27

## 2021-01-26 RX ADMIN — INSULIN LISPRO 7 UNITS: 100 INJECTION, SOLUTION INTRAVENOUS; SUBCUTANEOUS at 09:52

## 2021-01-26 RX ADMIN — INSULIN LISPRO 6 UNITS: 100 INJECTION, SOLUTION INTRAVENOUS; SUBCUTANEOUS at 16:23

## 2021-01-26 RX ADMIN — INSULIN GLARGINE 20 UNITS: 100 INJECTION, SOLUTION SUBCUTANEOUS at 12:54

## 2021-01-26 RX ADMIN — Medication 5 ML: at 13:07

## 2021-01-26 RX ADMIN — Medication 10 ML: at 06:00

## 2021-01-26 RX ADMIN — INSULIN LISPRO 8 UNITS: 100 INJECTION, SOLUTION INTRAVENOUS; SUBCUTANEOUS at 06:10

## 2021-01-26 RX ADMIN — TORSEMIDE 20 MG: 20 TABLET ORAL at 09:53

## 2021-01-26 RX ADMIN — CHOLECALCIFEROL TAB 25 MCG (1000 UNIT) 6 TABLET: 25 TAB at 09:54

## 2021-01-26 RX ADMIN — PRAVASTATIN SODIUM 40 MG: 20 TABLET ORAL at 22:00

## 2021-01-26 RX ADMIN — CEFTRIAXONE SODIUM 2 G: 2 INJECTION, POWDER, FOR SOLUTION INTRAMUSCULAR; INTRAVENOUS at 21:26

## 2021-01-26 RX ADMIN — ZINC SULFATE 220 MG (50 MG) CAPSULE 1 CAPSULE: CAPSULE at 09:54

## 2021-01-26 RX ADMIN — NYSTATIN: 100000 POWDER TOPICAL at 13:09

## 2021-01-26 RX ADMIN — METOPROLOL SUCCINATE 100 MG: 50 TABLET, EXTENDED RELEASE ORAL at 09:53

## 2021-01-26 RX ADMIN — REMDESIVIR 100 MG: 100 INJECTION, POWDER, LYOPHILIZED, FOR SOLUTION INTRAVENOUS at 12:53

## 2021-01-26 RX ADMIN — DOXYCYCLINE 100 MG: 100 INJECTION, POWDER, LYOPHILIZED, FOR SOLUTION INTRAVENOUS at 21:00

## 2021-01-26 RX ADMIN — INSULIN LISPRO 4 UNITS: 100 INJECTION, SOLUTION INTRAVENOUS; SUBCUTANEOUS at 22:00

## 2021-01-26 RX ADMIN — AMIODARONE HYDROCHLORIDE 200 MG: 200 TABLET ORAL at 09:53

## 2021-01-26 RX ADMIN — INSULIN LISPRO 7 UNITS: 100 INJECTION, SOLUTION INTRAVENOUS; SUBCUTANEOUS at 16:24

## 2021-01-26 RX ADMIN — OXYCODONE HYDROCHLORIDE AND ACETAMINOPHEN 1000 MG: 500 TABLET ORAL at 09:54

## 2021-01-26 RX ADMIN — Medication 10 ML: at 22:00

## 2021-01-26 RX ADMIN — PANTOPRAZOLE SODIUM 40 MG: 40 TABLET, DELAYED RELEASE ORAL at 09:54

## 2021-01-26 RX ADMIN — APIXABAN 5 MG: 5 TABLET, FILM COATED ORAL at 21:00

## 2021-01-26 RX ADMIN — SODIUM CHLORIDE 50 ML: 900 INJECTION, SOLUTION INTRAVENOUS at 12:55

## 2021-01-26 RX ADMIN — INSULIN LISPRO 7 UNITS: 100 INJECTION, SOLUTION INTRAVENOUS; SUBCUTANEOUS at 12:54

## 2021-01-26 RX ADMIN — APIXABAN 5 MG: 5 TABLET, FILM COATED ORAL at 09:54

## 2021-01-26 RX ADMIN — DEXAMETHASONE SODIUM PHOSPHATE 6 MG: 10 INJECTION INTRAMUSCULAR; INTRAVENOUS at 23:00

## 2021-01-26 RX ADMIN — FERROUS SULFATE TAB 325 MG (65 MG ELEMENTAL FE) 325 MG: 325 (65 FE) TAB at 16:23

## 2021-01-26 RX ADMIN — INSULIN GLARGINE 20 UNITS: 100 INJECTION, SOLUTION SUBCUTANEOUS at 21:00

## 2021-01-26 RX ADMIN — DOXYCYCLINE 100 MG: 100 INJECTION, POWDER, LYOPHILIZED, FOR SOLUTION INTRAVENOUS at 09:54

## 2021-01-26 RX ADMIN — FERROUS SULFATE TAB 325 MG (65 MG ELEMENTAL FE) 325 MG: 325 (65 FE) TAB at 09:53

## 2021-01-26 RX ADMIN — INSULIN LISPRO 6 UNITS: 100 INJECTION, SOLUTION INTRAVENOUS; SUBCUTANEOUS at 12:53

## 2021-01-26 RX ADMIN — NYSTATIN: 100000 POWDER TOPICAL at 09:55

## 2021-01-26 RX ADMIN — NYSTATIN: 100000 POWDER TOPICAL at 22:00

## 2021-01-26 RX ADMIN — NYSTATIN: 100000 POWDER TOPICAL at 17:33

## 2021-01-26 NOTE — DIABETES MGMT
Patient COVID+. Patient's blood glucose ranged 312-385 yesterday with patient receiving Lantus 20 units, Humalog 44 units, and dexamethasone 6 mg. Blood glucose 315 this morning. Recommend provider increase basal insulin to improve control. Notified provider of recommendations via [employer]. Provider to assess patient and adjust as they deem fit. Will follow along.

## 2021-01-26 NOTE — PROGRESS NOTES
Patient sleeping  Respirations even on 10LNCHF there are no signs of distress at this time  Will give BSR to oncoming RN upon arrival

## 2021-01-26 NOTE — PROGRESS NOTES
[facility] Hospitalist Service Progress Note      Assessment / Plan:    Acute respiratory failure with hypoxia 2/2 Covid-19 Pneumonia  Convalescent plasma ordered--pt agreeable  Dexamethasone for 10 days  Remdesivir for 5 days  Zinc/vitamin C/vit D daily  Added IS and albuterol prn  Prone as tolerated  SSI while on steroids  Oxygen supplementation. Wean O2 as tolerated  F/u D-Dimer and other covid labs. Monitor renal and hepatic function while on remdesivir.   Empiric Rocephin's, follow procalcitonin, discontinue if indicated  January 22 procalcitonin 0.5 5/9, equivocal, will continue Rocephin  January 23still on nasal cannula 2 L, no change, continue above treatment  January 24unfortunately need more oxygen today, now that 8 L, follow-up repeat chest x-ray showed worsening atypical pneumonia, already on max COVID-19 treatments, add doxycycline  January 25unchanged, still on 8 L, continue treatment  January 26more hypoxic, now requiring 10 L,  Already on max medical rx, will cont     Diabetes type 2, hyperglycemia contributed by dexamethasone use  Jan 25fasting glucose greater than 250, increase Lantus to 20 units nightly, increase Premeal insulin to 7 units, continue gentle insulin sliding scale    Acute kidney injuryfollow urine electrolytes, gentle IV fluids, bladder scan as needed, monitor renal function  January 22renal function improving serum creatinine 2.56, continue IV fluid  January 23renal function improved to 1.7, will discontinue IV fluids as appetite is improved, monitor    Hypernatremia most likely due to dehydration's, will hydrate with half-normal saline, monitor repeat  1/22sodium improving 155, continue above   1/23-serum sodium 147    History of systolic heart failure, cor pulmonale, 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, hypertensions, diabetes type 2, paroxysmal atrial fibrillation on chronic Eliquis  -Discontinue IV fluids when patient reached euvolemia,  -resume home medications if indicated ,and monitor clinically while inpatient, currently stable co morbidities add to patient's case complexity        DVT PPx: home eliquis   Code Status: dnr/dni      Anticipated discharge: 2-4 days, depending on patient's progression       Chief Complaint : Shortness of Breath      Subjective:     No acute events overnight, patient has no new complaints today,  Cardiovascular, respiratory, GI review of system negative except mentioned above  Objective:  Visit Vitals  /71 (BP 1 Location: Left arm, BP Patient Position: At rest)   Pulse 79   Temp 98 °F (36.7 °C)   Resp 18   Ht 5' 9\" (1.753 m)   Wt 94.4 kg (208 lb 1.8 oz)   SpO2 94%   BMI 30.73 kg/m²                 Physical Exam:  General: No acute distress, speaking in full sentences, no use of accessory muscles   HEENT: Pupils equal and reactive to light and accommodation, oropharynx is clear   Neck: Supple, no lymphadenopathy, no JVD   Lungs: Clear to auscultation bilaterally   Cardiovascular: Regular rate and rhythm with normal S1 and S2   Abdomen: Soft, nontender, nondistended, normoactive bowel sounds   Extremities: No cyanosis clubbing or edema   Neuro: Nonfocal, A&O x3   Psych: Normal affect     Intake and Output:  Date 01/25/21 0700 - 01/26/21 0659 01/26/21 0700 - 01/27/21 0659   Shift 4181-0606 4115-7214 24 Hour Total 8194-5352 8677-6261 24 Hour Total   INTAKE   P.O.  240 240        P. O.  240 240      I. V.(mL/kg/hr) 0(0)  0(0) 0  0     I.V. 0  0 0  0   Shift Total(mL/kg) 0(0) 240(2.5) 240(2.5) 0(0)  0(0)   OUTPUT   Urine(mL/kg/hr) 1200(1.1)  1200(0.5)        Urine Occurrence(s) 2 x 3 x 5 x        Urine Output (mL) (Condom Catheter 01/22/21) 1200  1200      Stool           Stool Occurrence(s)  0 x 0 x      Shift Total(mL/kg) 1200(12.6)  1200(12.7)      NET -1200 240 -960 0  0   Weight (kg) 94.9 94.4 94.4 94.4 94.4 94.4       LAB:  No results displayed because visit has over 200 results.           IMAGING:  Xr Chest Sngl V    Result Date: 1/24/2021  1. Interval development of peripheral and basilar predominant reticular lung markings suspicious for atypical pneumonia. Xr Chest Port    Result Date: 1/21/2021  AICD without acute cardiopulmonary abnormality. EKG:  No results found for this or any previous visit.           Jaun Severe, DO  1/26/2021 6:38 PM

## 2021-01-26 NOTE — PROGRESS NOTES
Late entry: BSR patient sleeping in bed  Respirations even on 8LNCHF  There are no signs of distress at this time

## 2021-01-26 NOTE — PROGRESS NOTES
ACUTE PHYSICAL THERAPY GOALS:  (Developed with and agreed upon by patient and/or caregiver.)    STG:  (1.)Mr. Bob will move from supine to sit and sit to supine  with MINIMAL ASSIST within 5 treatment day(s). (2.)Mr. Bob will transfer from bed to chair and chair to bed with CONTACT GUARD ASSIST using the least restrictive device within 5 treatment day(s). (3.)Mr. Bob will ambulate with MINIMAL ASSIST for 40 feet with the least restrictive device within 5 treatment day(s).      LTG:  (1.)Mr. Bob will move from supine to sit and sit to supine  in bed with SUPERVISION within 10 treatment day(s). (2.)Mr. Bob will transfer from bed to chair and chair to bed with SUPERVISION using the least restrictive device within 10 treatment day(s). (3.)Mr. Bob will ambulate with CONTACT GUARD ASSIST for 80 feet with the least restrictive device within 10 treatment day(s). PHYSICAL THERAPY ASSESSMENT: Daily Note and PM PT Treatment Day # 2      Nadira Antunez is a 72 y.o. male   PRIMARY DIAGNOSIS: COVID-19  COVID-19 [U07.1]       Reason for Referral:    ICD-10: Treatment Diagnosis: Difficulty in walking, Not elsewhere classified (R26.2)  Other abnormalities of gait and mobility (R26.89)  Unspecified Lack of Coordination (R27.9)  [place]PAT[place] / [place] / Product Type: Medicare /     ASSESSMENT:     REHAB RECOMMENDATIONS:   Recommendation to date pending progress:  Setting:   Short-term Rehab  Equipment:    To Be Determined     PRIOR LEVEL OF FUNCTION:  (Prior to Hospitalization) INITIAL/CURRENT LEVEL OF FUNCTION:  (Most Recently Demonstrated)   Bed Mobility:   Independent  Sit to Stand:   Independent  Transfers:   Independent  Gait/Mobility:   Independent Bed Mobility:   Standby Assistance  Sit to Stand:  [facility] [employer] Assistance  Transfers:   Standby Assistance  Gait/Mobility:   Standby Assistance     ASSESSMENT:  Mr. Mari Ugner presents in supine.  He moves throughout session w/ SBA, including 3 slow bouts of gait w/ RW and cues for posture and step clearance. At end of session, he is up in bedside chair with all needs within reach, RN notified. SUBJECTIVE:   Mr. Bridget Miranda states, \"Nice to meet you. \"    SOCIAL HISTORY/LIVING ENVIRONMENT:   Home Environment: Private residence  # Steps to Enter: 0  One/Two Story Residence: One story  Living Alone: No  Support Systems: Spouse/Significant Other/Partner  OBJECTIVE:     PAIN: VITAL SIGNS: LINES/DRAINS:   Pre Treatment: Pain Screen  Pain Scale 1: Numeric (0 - 10)  Pain Intensity 1: 0  Post Treatment: 0   none  O2 Device: Hi flow nasal cannula     GROSS EVALUATION:   Within Functional Limits Abnormal/ Functional Abnormal/ Non-Functional (see comments) Not Tested Comments:   AROM [x] [] [] []    PROM [x] [] [] []    Strength [] [x] [] []    Balance [] [x] [] []    Posture [] [x] [] []    Sensation [x] [] [] []    Coordination [x] [] [] []    Tone [x] [] [] []    Edema [x] [] [] []    Activity Tolerance [] [] [x] [] Frequent seated rest breaks    [] [] [] []      COGNITION/  PERCEPTION: Intact Impaired   (see comments) Comments:   Orientation [x] []    Vision [x] []    Hearing [x] []    Command Following [x] []    Safety Awareness [x] []     [] []      MOBILITY: I Mod I S SBA CGA Min Mod Max Total  NT x2 Comments:   Bed Mobility    Rolling [] [] [] [x] [] [] [] [] [] [] []    Supine to Sit [] [] [] [x] [] [] [] [] [] [] []    Scooting [] [] [] [x] [] [] [] [] [] [] []    Sit to Supine [] [] [] [] [] [] [] [] [] [x] []    Transfers    Sit to Stand [] [] [] [x] [] [] [] [] [] [] []    Bed to Chair [] [] [] [x] [] [] [] [] [] [] []    Stand to Sit [] [] [] [x] [] [] [] [] [] [] []    I=Independent, Mod I=Modified Independent, S=Supervision, SBA=Standby Assistance, CGA=Contact Guard Assistance,   Min=Minimal Assistance, Mod=Moderate Assistance, Max=Maximal Assistance, Total=Total Assistance, NT=Not Tested  GAIT: I Mod I S SBA CGA Min Mod Max Total  NT x2 Comments:   Level of Assistance [] [] [] [x] [] [] [] [] [] [] []    Distance 3 x 25 ft    DME Rolling Walker and Gait Belt    Gait Quality Slow and shuffled    Weightbearing Status N/A     I=Independent, Mod I=Modified Independent, S=Supervision, SBA=Standby Assistance, CGA=Contact Guard Assistance,   Min=Minimal Assistance, Mod=Moderate Assistance, Max=Maximal Assistance, Total=Total Assistance, NT=Not Tested    [facility] AM-PAC™ “6 Clicks”   Basic Mobility Inpatient Short Form       How much difficulty does the patient currently have... Unable A Lot A Little None   1.  Turning over in bed (including adjusting bedclothes, sheets and blankets)?   [] 1   [] 2   [x] 3   [] 4   2.  Sitting down on and standing up from a chair with arms ( e.g., wheelchair, bedside commode, etc.)   [] 1   [] 2   [x] 3   [] 4   3.  Moving from lying on back to sitting on the side of the bed?   [] 1   [] 2   [x] 3   [] 4   How much help from another person does the patient currently need... Total A Lot A Little None   4.  Moving to and from a bed to a chair (including a wheelchair)?   [] 1   [] 2   [x] 3   [] 4   5.  Need to walk in hospital room?   [] 1   [] 2   [x] 3   [] 4   6.  Climbing 3-5 steps with a railing?   [] 1   [x] 2   [] 3   [] 4   © 2007, Trustees of [facility], under license to [employer]. All rights reserved     Score:  Initial: 17 Most Recent: X (Date: -- )    Interpretation of Tool:  Represents activities that are increasingly more difficult (i.e. Bed mobility, Transfers, Gait).    PLAN:   FREQUENCY/DURATION: PT Plan of Care: 3 times/week for duration of hospital stay or until stated goals are met, whichever comes first.    PROBLEM LIST:   (Skilled intervention is medically necessary to address:)  1. Decreased ADL/Functional Activities  2. Decreased Activity Tolerance  3. Decreased AROM/PROM  4. Decreased Balance  5. Decreased Coordination   INTERVENTIONS PLANNED:   (Benefits  and precautions of physical therapy have been discussed with the patient.)  1. Therapeutic Activity  2. Therapeutic Exercise/HEP  3. Neuromuscular Re-education  4. Gait Training  5. Manual Therapy  6. Education     TREATMENT:     TREATMENT:   ($$ Therapeutic Activity: 23-37 mins    )  Therapeutic Activity (24 Minutes): Therapeutic activity included Transfer Training and Ambulation on level ground to improve functional Mobility, Strength, ROM and Activity tolerance.     AFTER TREATMENT POSITION/PRECAUTIONS:  Chair, Needs within reach and RN notified    INTERDISCIPLINARY COLLABORATION:  RN/PCT and PT/PTA    TOTAL TREATMENT DURATION:  PT Patient Time In/Time Out  Time In: 1440  Time Out: [facility]

## 2021-01-27 LAB
ALBUMIN SERPL-MCNC: 2.6 G/DL (ref 3.2–4.6)
ALBUMIN/GLOB SERPL: 0.6 {RATIO} (ref 1.2–3.5)
ALP SERPL-CCNC: 105 U/L (ref 50–136)
ALT SERPL-CCNC: 131 U/L (ref 12–65)
ANION GAP SERPL CALC-SCNC: 9 MMOL/L (ref 7–16)
AST SERPL-CCNC: 47 U/L (ref 15–37)
BASOPHILS # BLD: 0 K/UL (ref 0–0.2)
BASOPHILS NFR BLD: 0 % (ref 0–2)
BILIRUB SERPL-MCNC: 0.5 MG/DL (ref 0.2–1.1)
BUN SERPL-MCNC: 56 MG/DL (ref 8–23)
CALCIUM SERPL-MCNC: 9.1 MG/DL (ref 8.3–10.4)
CHLORIDE SERPL-SCNC: 104 MMOL/L (ref 98–107)
CO2 SERPL-SCNC: 25 MMOL/L (ref 21–32)
CREAT SERPL-MCNC: 1.35 MG/DL (ref 0.8–1.5)
D DIMER PPP FEU-MCNC: 3.09 UG/ML(FEU)
DIFFERENTIAL METHOD BLD: ABNORMAL
EOSINOPHIL # BLD: 0 K/UL (ref 0–0.8)
EOSINOPHIL NFR BLD: 0 % (ref 0.5–7.8)
ERYTHROCYTE [DISTWIDTH] IN BLOOD BY AUTOMATED COUNT: 18.3 % (ref 11.9–14.6)
GLOBULIN SER CALC-MCNC: 4.7 G/DL (ref 2.3–3.5)
GLUCOSE BLD STRIP.AUTO-MCNC: 221 MG/DL (ref 65–100)
GLUCOSE BLD STRIP.AUTO-MCNC: 325 MG/DL (ref 65–100)
GLUCOSE BLD STRIP.AUTO-MCNC: 359 MG/DL (ref 65–100)
GLUCOSE BLD STRIP.AUTO-MCNC: 375 MG/DL (ref 65–100)
GLUCOSE SERPL-MCNC: 316 MG/DL (ref 65–100)
HCT VFR BLD AUTO: 52 % (ref 41.1–50.3)
HGB BLD-MCNC: 16.9 G/DL (ref 13.6–17.2)
IMM GRANULOCYTES # BLD AUTO: 0.3 K/UL (ref 0–0.5)
IMM GRANULOCYTES NFR BLD AUTO: 2 % (ref 0–5)
LYMPHOCYTES # BLD: 0.4 K/UL (ref 0.5–4.6)
LYMPHOCYTES NFR BLD: 3 % (ref 13–44)
MCH RBC QN AUTO: 26.4 PG (ref 26.1–32.9)
MCHC RBC AUTO-ENTMCNC: 32.5 G/DL (ref 31.4–35)
MCV RBC AUTO: 81.4 FL (ref 79.6–97.8)
MM INDURATION POC: 0 MM (ref 0–5)
MONOCYTES # BLD: 0.8 K/UL (ref 0.1–1.3)
MONOCYTES NFR BLD: 6 % (ref 4–12)
NEUTS SEG # BLD: 12 K/UL (ref 1.7–8.2)
NEUTS SEG NFR BLD: 89 % (ref 43–78)
NRBC # BLD: 0 K/UL (ref 0–0.2)
PLATELET # BLD AUTO: 280 K/UL (ref 150–450)
PMV BLD AUTO: 10.4 FL (ref 9.4–12.3)
POTASSIUM SERPL-SCNC: 3.9 MMOL/L (ref 3.5–5.1)
PPD POC: NEGATIVE NEGATIVE
PROT SERPL-MCNC: 7.3 G/DL (ref 6.3–8.2)
RBC # BLD AUTO: 6.39 M/UL (ref 4.23–5.6)
SODIUM SERPL-SCNC: 138 MMOL/L (ref 138–145)
WBC # BLD AUTO: 13.5 K/UL (ref 4.3–11.1)

## 2021-01-27 PROCEDURE — 85025 COMPLETE CBC W/AUTO DIFF WBC: CPT

## 2021-01-27 PROCEDURE — 74011636637 HC RX REV CODE- 636/637: Performed by: FAMILY MEDICINE

## 2021-01-27 PROCEDURE — 74011250637 HC RX REV CODE- 250/637: Performed by: INTERNAL MEDICINE

## 2021-01-27 PROCEDURE — 80053 COMPREHEN METABOLIC PANEL: CPT

## 2021-01-27 PROCEDURE — 36415 COLL VENOUS BLD VENIPUNCTURE: CPT

## 2021-01-27 PROCEDURE — 74011000250 HC RX REV CODE- 250: Performed by: HOSPITALIST

## 2021-01-27 PROCEDURE — 74011250637 HC RX REV CODE- 250/637: Performed by: HOSPITALIST

## 2021-01-27 PROCEDURE — 94640 AIRWAY INHALATION TREATMENT: CPT

## 2021-01-27 PROCEDURE — 77010033711 HC HIGH FLOW OXYGEN

## 2021-01-27 PROCEDURE — 82962 GLUCOSE BLOOD TEST: CPT

## 2021-01-27 PROCEDURE — 74011636637 HC RX REV CODE- 636/637: Performed by: HOSPITALIST

## 2021-01-27 PROCEDURE — 74011250637 HC RX REV CODE- 250/637: Performed by: FAMILY MEDICINE

## 2021-01-27 PROCEDURE — 74011000258 HC RX REV CODE- 258: Performed by: HOSPITALIST

## 2021-01-27 PROCEDURE — 74011250636 HC RX REV CODE- 250/636: Performed by: HOSPITALIST

## 2021-01-27 PROCEDURE — 65270000029 HC RM PRIVATE

## 2021-01-27 PROCEDURE — 94760 N-INVAS EAR/PLS OXIMETRY 1: CPT

## 2021-01-27 PROCEDURE — 85379 FIBRIN DEGRADATION QUANT: CPT

## 2021-01-27 RX ORDER — ALBUTEROL SULFATE 90 UG/1
2 AEROSOL, METERED RESPIRATORY (INHALATION)
Status: DISCONTINUED | OUTPATIENT
Start: 2021-01-27 | End: 2021-01-28

## 2021-01-27 RX ORDER — INSULIN GLARGINE 100 [IU]/ML
30 INJECTION, SOLUTION SUBCUTANEOUS EVERY 12 HOURS
Status: DISCONTINUED | OUTPATIENT
Start: 2021-01-27 | End: 2021-01-28

## 2021-01-27 RX ADMIN — FERROUS SULFATE TAB 325 MG (65 MG ELEMENTAL FE) 325 MG: 325 (65 FE) TAB at 17:14

## 2021-01-27 RX ADMIN — ALBUTEROL SULFATE 2 PUFF: 108 INHALANT RESPIRATORY (INHALATION) at 19:30

## 2021-01-27 RX ADMIN — Medication 10 ML: at 13:18

## 2021-01-27 RX ADMIN — PANTOPRAZOLE SODIUM 40 MG: 40 TABLET, DELAYED RELEASE ORAL at 09:19

## 2021-01-27 RX ADMIN — INSULIN LISPRO 7 UNITS: 100 INJECTION, SOLUTION INTRAVENOUS; SUBCUTANEOUS at 17:14

## 2021-01-27 RX ADMIN — TORSEMIDE 20 MG: 20 TABLET ORAL at 09:21

## 2021-01-27 RX ADMIN — DOXYCYCLINE 100 MG: 100 INJECTION, POWDER, LYOPHILIZED, FOR SOLUTION INTRAVENOUS at 21:47

## 2021-01-27 RX ADMIN — METOPROLOL SUCCINATE 100 MG: 50 TABLET, EXTENDED RELEASE ORAL at 09:18

## 2021-01-27 RX ADMIN — INSULIN LISPRO 4 UNITS: 100 INJECTION, SOLUTION INTRAVENOUS; SUBCUTANEOUS at 21:48

## 2021-01-27 RX ADMIN — ZINC SULFATE 220 MG (50 MG) CAPSULE 1 CAPSULE: CAPSULE at 09:18

## 2021-01-27 RX ADMIN — INSULIN LISPRO 10 UNITS: 100 INJECTION, SOLUTION INTRAVENOUS; SUBCUTANEOUS at 17:14

## 2021-01-27 RX ADMIN — NYSTATIN: 100000 POWDER TOPICAL at 09:21

## 2021-01-27 RX ADMIN — DEXAMETHASONE SODIUM PHOSPHATE 6 MG: 10 INJECTION INTRAMUSCULAR; INTRAVENOUS at 21:56

## 2021-01-27 RX ADMIN — INSULIN LISPRO 8 UNITS: 100 INJECTION, SOLUTION INTRAVENOUS; SUBCUTANEOUS at 12:29

## 2021-01-27 RX ADMIN — INSULIN LISPRO 7 UNITS: 100 INJECTION, SOLUTION INTRAVENOUS; SUBCUTANEOUS at 09:16

## 2021-01-27 RX ADMIN — Medication 10 ML: at 06:00

## 2021-01-27 RX ADMIN — APIXABAN 5 MG: 5 TABLET, FILM COATED ORAL at 09:18

## 2021-01-27 RX ADMIN — INSULIN LISPRO 10 UNITS: 100 INJECTION, SOLUTION INTRAVENOUS; SUBCUTANEOUS at 06:09

## 2021-01-27 RX ADMIN — INSULIN LISPRO 7 UNITS: 100 INJECTION, SOLUTION INTRAVENOUS; SUBCUTANEOUS at 12:27

## 2021-01-27 RX ADMIN — FERROUS SULFATE TAB 325 MG (65 MG ELEMENTAL FE) 325 MG: 325 (65 FE) TAB at 09:19

## 2021-01-27 RX ADMIN — OXYCODONE HYDROCHLORIDE AND ACETAMINOPHEN 1000 MG: 500 TABLET ORAL at 09:19

## 2021-01-27 RX ADMIN — DOXYCYCLINE 100 MG: 100 INJECTION, POWDER, LYOPHILIZED, FOR SOLUTION INTRAVENOUS at 09:16

## 2021-01-27 RX ADMIN — NYSTATIN: 100000 POWDER TOPICAL at 17:17

## 2021-01-27 RX ADMIN — APIXABAN 5 MG: 5 TABLET, FILM COATED ORAL at 21:46

## 2021-01-27 RX ADMIN — PRAVASTATIN SODIUM 40 MG: 20 TABLET ORAL at 21:50

## 2021-01-27 RX ADMIN — CHOLECALCIFEROL TAB 25 MCG (1000 UNIT) 6 TABLET: 25 TAB at 09:19

## 2021-01-27 RX ADMIN — AMIODARONE HYDROCHLORIDE 200 MG: 200 TABLET ORAL at 09:18

## 2021-01-27 RX ADMIN — NYSTATIN: 100000 POWDER TOPICAL at 21:49

## 2021-01-27 RX ADMIN — INSULIN GLARGINE 30 UNITS: 100 INJECTION, SOLUTION SUBCUTANEOUS at 09:31

## 2021-01-27 RX ADMIN — INSULIN GLARGINE 30 UNITS: 100 INJECTION, SOLUTION SUBCUTANEOUS at 21:49

## 2021-01-27 RX ADMIN — CEFTRIAXONE SODIUM 2 G: 2 INJECTION, POWDER, FOR SOLUTION INTRAMUSCULAR; INTRAVENOUS at 21:46

## 2021-01-27 RX ADMIN — NYSTATIN: 100000 POWDER TOPICAL at 12:30

## 2021-01-27 RX ADMIN — REMDESIVIR 100 MG: 100 INJECTION, POWDER, LYOPHILIZED, FOR SOLUTION INTRAVENOUS at 12:28

## 2021-01-27 RX ADMIN — SODIUM CHLORIDE 50 ML: 900 INJECTION, SOLUTION INTRAVENOUS at 12:29

## 2021-01-27 RX ADMIN — Medication 10 ML: at 22:00

## 2021-01-27 NOTE — PROGRESS NOTES
SQBS 359. Humalog meal coverage 7 units. Humalog sliding scale coverage 10 units. Total of 17 units of Humalog.  No additional coverage per MD.

## 2021-01-27 NOTE — PROGRESS NOTES
BSR received  Patient A/Ox3 resting in bed watching tv  Respirations even on 10LNCHF with no signs of distress  Patient denies needs/pain at this time   Bed in lowest position wheels locked call light within reach  Patient instructed to call for assistance when needed  verbalized understanding

## 2021-01-27 NOTE — PROGRESS NOTES
Progress Note    Patient: Ree Rao MRN: [id number]  SSN: [id number]    YOB: 1950  Age: 72 y.o. Sex: male      Admit Date: 1/21/2021    LOS: 6 days     Subjective:   F/U COVID    73 yo hx of DM type II, cor pulmonale, eczema, sCHF EF 55%, paroxysmal a fib on Eliquis, HTN admitted for COVID. Also given doxycycline and ceftriaxone for possible bacterial pneumonia for 7 days. Remdesivir 1/25-1/29    FORTINO seen at 1.71 but now improved to 1.3.     10L NC. Glucose elevated. No chest pain or SOB.  Feeling weak  Current Facility-Administered Medications   Medication Dose Route Frequency    insulin glargine (LANTUS) injection 30 Units  30 Units SubCUTAneous Q12H    remdesivir 100 mg in 0.9% sodium chloride 250 mL IVPB  100 mg IntraVENous Q24H    0.9% sodium chloride infusion 50 mL remdesivir flush  50 mL IntraVENous Q24H    insulin lispro (HUMALOG) injection 7 Units  7 Units SubCUTAneous TID WITH MEALS    phenol throat spray (CHLORASEPTIC) 1 Spray  1 Spray Oral PRN    insulin lispro (HUMALOG) injection   SubCUTAneous AC&HS    torsemide (DEMADEX) tablet 20 mg  20 mg Oral DAILY    doxycycline (VIBRAMYCIN) 100 mg in 0.9% sodium chloride (MBP/ADV) 100 mL MBP  100 mg IntraVENous Q12H    zinc sulfate (ZINCATE) 220 (50) mg capsule 1 Cap  1 Cap Oral DAILY    ferrous sulfate tablet 325 mg  1 Tab Oral BID WITH MEALS    pantoprazole (PROTONIX) tablet 40 mg  40 mg Oral DAILY    apixaban (ELIQUIS) tablet 5 mg  5 mg Oral BID    metoprolol succinate (TOPROL-XL) XL tablet 100 mg  100 mg Oral DAILY    nystatin (MYCOSTATIN) 100,000 unit/gram powder   Topical QID    pravastatin (PRAVACHOL) tablet 40 mg  40 mg Oral QHS    amiodarone (CORDARONE) tablet 200 mg  200 mg Oral DAILY    ondansetron (ZOFRAN ODT) tablet 4 mg  4 mg Oral Q8H PRN    loratadine (CLARITIN) tablet 10 mg  10 mg Oral DAILY PRN    sodium chloride (NS) flush 5-40 mL  5-40 mL IntraVENous Q8H    sodium chloride (NS) flush 5-40 mL 5-40 mL IntraVENous PRN    acetaminophen (TYLENOL) tablet 650 mg  650 mg Oral Q6H PRN    Or    acetaminophen (TYLENOL) suppository 650 mg  650 mg Rectal Q6H PRN    polyethylene glycol (MIRALAX) packet 17 g  17 g Oral DAILY    senna (SENOKOT) tablet 8.6 mg  1 Tab Oral DAILY PRN    bisacodyL (DULCOLAX) suppository 10 mg  10 mg Rectal DAILY PRN    guaiFENesin-dextromethorphan (ROBITUSSIN DM) 100-10 mg/5 mL syrup 5 mL  5 mL Oral Q4H PRN    cholecalciferol (VITAMIN D3) (1000 Units /25 mcg) tablet 6 Tab  6,000 Units Oral DAILY    Followed by   Marine Thomas ON 1/29/2021] cholecalciferol (VITAMIN D3) (1000 Units /25 mcg) tablet 2 Tab  2,000 Units Oral DAILY    dexamethasone (DECADRON) 10 mg/mL injection 6 mg  6 mg IntraVENous Q24H    0.9% sodium chloride infusion 250 mL  250 mL IntraVENous PRN    cefTRIAXone (ROCEPHIN) 2 g in 0.9% sodium chloride (MBP/ADV) 50 mL MBP  2 g IntraVENous Q24H    ascorbic acid (vitamin C) (VITAMIN C) tablet 1,000 mg  1,000 mg Oral DAILY       Objective:     Vitals:    01/27/21 0753 01/27/21 1048 01/27/21 1103 01/27/21 1551   BP: 100/62  111/78 116/74   Pulse: 82  82 81   Resp: 18  18 18   Temp: 97.8 °F (36.6 °C)  97.9 °F (36.6 °C) 97.8 °F (36.6 °C)   SpO2: 92%  93% 92%   Weight:  94.5 kg (208 lb 5.4 oz)     Height:             Intake and Output:  Current Shift: 01/27 0701 - 01/27 1900  In: 240 [P.O.:240]  Out: 1100 [Urine:1100]  Last three shifts: 01/25 1901 - 01/27 0700  In: 1320 [P.O.:1320]  Out: -     Physical Exam:   General:  Alert, cooperative, no distress, appears stated age. Frail appearing, pale   Eyes:  Conjunctivae/corneas clear. Ears:  Normal TMs and external ear canals both ears. Nose: Nares normal. Septum midline. Mouth/Throat: Lips, mucosa, and tongue normal.    Neck: no JVD. Back:   deferred   Lungs:   Clear to auscultation bilaterally but limited breathe sounds. Heart:  Regular rate and rhythm, S1, S2 normal   Abdomen:   Soft, non-tender.  Bowel sounds normal. Extremities: Extremities normal, atraumatic, no cyanosis or edema. Pulses: 2+ and symmetric all extremities. Skin: Dry skin to legs   Lymph nodes: Cervical, supraclavicular, and axillary nodes normal.   Neurologic: CNII-XII intact. Limited ROM in bed. Lab/Data Review:    Recent Results (from the past 24 hour(s))   GLUCOSE, POC    Collection Time: 01/26/21  4:19 PM   Result Value Ref Range    Glucose (POC) 288 (H) 65 - 100 mg/dL   PLEASE READ & DOCUMENT PPD TEST IN 24 HRS    Collection Time: 01/26/21  5:05 PM   Result Value Ref Range    PPD Negative Negative    mm Induration 0 0 - 5 mm   GLUCOSE, POC    Collection Time: 01/26/21  9:02 PM   Result Value Ref Range    Glucose (POC) 232 (H) 65 - 100 mg/dL   GLUCOSE, POC    Collection Time: 01/27/21  5:48 AM   Result Value Ref Range    Glucose (POC) 375 (H) 65 - 100 mg/dL   GLUCOSE, POC    Collection Time: 01/27/21 11:48 AM   Result Value Ref Range    Glucose (POC) 325 (H) 65 - 999 mg/dL   METABOLIC PANEL, COMPREHENSIVE    Collection Time: 01/27/21 11:51 AM   Result Value Ref Range    Sodium 138 138 - 145 mmol/L    Potassium 3.9 3.5 - 5.1 mmol/L    Chloride 104 98 - 107 mmol/L    CO2 25 21 - 32 mmol/L    Anion gap 9 7 - 16 mmol/L    Glucose 316 (H) 65 - 100 mg/dL    BUN 56 (H) 8 - 23 MG/DL    Creatinine 1.35 0.8 - 1.5 MG/DL    GFR est AA >60 >60 ml/min/1.73m2    GFR est non-AA 56 (L) >60 ml/min/1.73m2    Calcium 9.1 8.3 - 10.4 MG/DL    Bilirubin, total 0.5 0.2 - 1.1 MG/DL    ALT (SGPT) 131 (H) 12 - 65 U/L    AST (SGOT) 47 (H) 15 - 37 U/L    Alk.  phosphatase 105 50 - 136 U/L    Protein, total 7.3 6.3 - 8.2 g/dL    Albumin 2.6 (L) 3.2 - 4.6 g/dL    Globulin 4.7 (H) 2.3 - 3.5 g/dL    A-G Ratio 0.6 (L) 1.2 - 3.5     CBC WITH AUTOMATED DIFF    Collection Time: 01/27/21 11:51 AM   Result Value Ref Range    WBC 13.5 (H) 4.3 - 11.1 K/uL    RBC 6.39 (H) 4.23 - 5.6 M/uL    HGB 16.9 13.6 - 17.2 g/dL    HCT 52.0 (H) 41.1 - 50.3 %    MCV 81.4 79.6 - 97.8 FL    MCH 26.4 26.1 - 32.9 PG    MCHC 32.5 31.4 - 35.0 g/dL    RDW 18.3 (H) 11.9 - 14.6 %    PLATELET 227 378 - 846 K/uL    MPV 10.4 9.4 - 12.3 FL    ABSOLUTE NRBC 0.00 0.0 - 0.2 K/uL    NEUTROPHILS 89 (H) 43 - 78 %    LYMPHOCYTES 3 (L) 13 - 44 %    MONOCYTES 6 4.0 - 12.0 %    EOSINOPHILS 0 (L) 0.5 - 7.8 %    BASOPHILS 0 0.0 - 2.0 %    IMMATURE GRANULOCYTES 2 0.0 - 5.0 %    ABS. NEUTROPHILS 12.0 (H) 1.7 - 8.2 K/UL    ABS. LYMPHOCYTES 0.4 (L) 0.5 - 4.6 K/UL    ABS. MONOCYTES 0.8 0.1 - 1.3 K/UL    ABS. EOSINOPHILS 0.0 0.0 - 0.8 K/UL    ABS. BASOPHILS 0.0 0.0 - 0.2 K/UL    ABS. IMM. GRANS. 0.3 0.0 - 0.5 K/UL    DF AUTOMATED     D DIMER    Collection Time: 01/27/21 11:51 AM   Result Value Ref Range    D DIMER 3.09 (H) <0.56 ug/ml(FEU)       Assessment/ Plan:     Principal Problem:    COVID-19 (1/21/2021)    Active Problems:    Severe sepsis ([facility] [address] (1/23/2021)    COVID with possible bacterial pneumonia - Ceftriaxone and doxycycline. Vitamin C, vitamin D, decadron. Remdesivir with last does tomorrow. Add albuterol    DM type II - lantus 30 units BID, 7 units before meals, SS.     HTN- BB    sCHF - compensated. BB. Demadex    A fib - Eliquis, amiodarone.      DVT prophylaxis - Eliquis    Signed By: Farnaz Edwards DO     January 27, 2021

## 2021-01-27 NOTE — DIABETES MGMT
Patient admitted with COVID-19. Blood glucose ranged 232-317 yesterday with patient receiving Lantus 40 units, Humalog 45 units, and Decadron 6mg. Blood glucose this morning was 375. Reviewed patient current regimen: Lantus 20 units BID, Humalog 7 units with meals, Humalog SSI, and Decadron 6mg nightly. Patient would likely benefit from an increase in basal insulin as fasting blood glucose is not at goal. Provider updated via [employer] regarding recommendations and patient glycemic control.

## 2021-01-28 LAB
GLUCOSE BLD STRIP.AUTO-MCNC: 197 MG/DL (ref 65–100)
GLUCOSE BLD STRIP.AUTO-MCNC: 238 MG/DL (ref 65–100)
GLUCOSE BLD STRIP.AUTO-MCNC: 253 MG/DL (ref 65–100)
GLUCOSE BLD STRIP.AUTO-MCNC: 333 MG/DL (ref 65–100)

## 2021-01-28 PROCEDURE — 94640 AIRWAY INHALATION TREATMENT: CPT

## 2021-01-28 PROCEDURE — 77010033678 HC OXYGEN DAILY

## 2021-01-28 PROCEDURE — 74011250637 HC RX REV CODE- 250/637: Performed by: FAMILY MEDICINE

## 2021-01-28 PROCEDURE — 74011000258 HC RX REV CODE- 258: Performed by: HOSPITALIST

## 2021-01-28 PROCEDURE — 97530 THERAPEUTIC ACTIVITIES: CPT | Performed by: PHYSICAL THERAPIST

## 2021-01-28 PROCEDURE — 2709999900 HC NON-CHARGEABLE SUPPLY

## 2021-01-28 PROCEDURE — 77030040830 HC CATH URETH FOL MDII -A

## 2021-01-28 PROCEDURE — 74011636637 HC RX REV CODE- 636/637: Performed by: HOSPITALIST

## 2021-01-28 PROCEDURE — 74011250637 HC RX REV CODE- 250/637: Performed by: HOSPITALIST

## 2021-01-28 PROCEDURE — 82962 GLUCOSE BLOOD TEST: CPT

## 2021-01-28 PROCEDURE — 74011636637 HC RX REV CODE- 636/637: Performed by: FAMILY MEDICINE

## 2021-01-28 PROCEDURE — 74011250637 HC RX REV CODE- 250/637: Performed by: INTERNAL MEDICINE

## 2021-01-28 PROCEDURE — 74011250636 HC RX REV CODE- 250/636: Performed by: HOSPITALIST

## 2021-01-28 PROCEDURE — 94760 N-INVAS EAR/PLS OXIMETRY 1: CPT

## 2021-01-28 PROCEDURE — 74011000250 HC RX REV CODE- 250: Performed by: HOSPITALIST

## 2021-01-28 PROCEDURE — 65270000029 HC RM PRIVATE

## 2021-01-28 RX ORDER — INSULIN GLARGINE 100 [IU]/ML
5 INJECTION, SOLUTION SUBCUTANEOUS ONCE
Status: COMPLETED | OUTPATIENT
Start: 2021-01-28 | End: 2021-01-28

## 2021-01-28 RX ORDER — INSULIN GLARGINE 100 [IU]/ML
35 INJECTION, SOLUTION SUBCUTANEOUS EVERY 12 HOURS
Status: DISCONTINUED | OUTPATIENT
Start: 2021-01-28 | End: 2021-02-01 | Stop reason: HOSPADM

## 2021-01-28 RX ORDER — MELATONIN
1000 DAILY
Status: DISCONTINUED | OUTPATIENT
Start: 2021-01-29 | End: 2021-02-01 | Stop reason: HOSPADM

## 2021-01-28 RX ORDER — DOXYCYCLINE 100 MG/1
100 CAPSULE ORAL EVERY 12 HOURS
Status: COMPLETED | OUTPATIENT
Start: 2021-01-28 | End: 2021-01-31

## 2021-01-28 RX ORDER — ALBUTEROL SULFATE 90 UG/1
2 AEROSOL, METERED RESPIRATORY (INHALATION)
Status: DISCONTINUED | OUTPATIENT
Start: 2021-01-28 | End: 2021-01-29

## 2021-01-28 RX ADMIN — REMDESIVIR 100 MG: 100 INJECTION, POWDER, LYOPHILIZED, FOR SOLUTION INTRAVENOUS at 11:59

## 2021-01-28 RX ADMIN — SODIUM CHLORIDE 50 ML: 900 INJECTION, SOLUTION INTRAVENOUS at 12:00

## 2021-01-28 RX ADMIN — PRAVASTATIN SODIUM 40 MG: 20 TABLET ORAL at 22:03

## 2021-01-28 RX ADMIN — INSULIN GLARGINE 30 UNITS: 100 INJECTION, SOLUTION SUBCUTANEOUS at 08:17

## 2021-01-28 RX ADMIN — PANTOPRAZOLE SODIUM 40 MG: 40 TABLET, DELAYED RELEASE ORAL at 08:13

## 2021-01-28 RX ADMIN — INSULIN LISPRO 6 UNITS: 100 INJECTION, SOLUTION INTRAVENOUS; SUBCUTANEOUS at 05:59

## 2021-01-28 RX ADMIN — OXYCODONE HYDROCHLORIDE AND ACETAMINOPHEN 1000 MG: 500 TABLET ORAL at 08:14

## 2021-01-28 RX ADMIN — NYSTATIN: 100000 POWDER TOPICAL at 08:15

## 2021-01-28 RX ADMIN — INSULIN LISPRO 2 UNITS: 100 INJECTION, SOLUTION INTRAVENOUS; SUBCUTANEOUS at 22:07

## 2021-01-28 RX ADMIN — INSULIN LISPRO 8 UNITS: 100 INJECTION, SOLUTION INTRAVENOUS; SUBCUTANEOUS at 11:57

## 2021-01-28 RX ADMIN — NYSTATIN: 100000 POWDER TOPICAL at 17:22

## 2021-01-28 RX ADMIN — APIXABAN 5 MG: 5 TABLET, FILM COATED ORAL at 08:13

## 2021-01-28 RX ADMIN — INSULIN LISPRO 7 UNITS: 100 INJECTION, SOLUTION INTRAVENOUS; SUBCUTANEOUS at 08:16

## 2021-01-28 RX ADMIN — NYSTATIN: 100000 POWDER TOPICAL at 13:21

## 2021-01-28 RX ADMIN — DOXYCYCLINE HYCLATE 100 MG: 100 CAPSULE ORAL at 22:04

## 2021-01-28 RX ADMIN — APIXABAN 5 MG: 5 TABLET, FILM COATED ORAL at 22:03

## 2021-01-28 RX ADMIN — TORSEMIDE 20 MG: 20 TABLET ORAL at 09:00

## 2021-01-28 RX ADMIN — FERROUS SULFATE TAB 325 MG (65 MG ELEMENTAL FE) 325 MG: 325 (65 FE) TAB at 17:21

## 2021-01-28 RX ADMIN — NYSTATIN: 100000 POWDER TOPICAL at 22:08

## 2021-01-28 RX ADMIN — Medication 10 ML: at 22:09

## 2021-01-28 RX ADMIN — INSULIN GLARGINE 35 UNITS: 100 INJECTION, SOLUTION SUBCUTANEOUS at 22:05

## 2021-01-28 RX ADMIN — ZINC SULFATE 220 MG (50 MG) CAPSULE 1 CAPSULE: CAPSULE at 08:14

## 2021-01-28 RX ADMIN — METOPROLOL SUCCINATE 100 MG: 50 TABLET, EXTENDED RELEASE ORAL at 08:14

## 2021-01-28 RX ADMIN — DOXYCYCLINE 100 MG: 100 INJECTION, POWDER, LYOPHILIZED, FOR SOLUTION INTRAVENOUS at 08:15

## 2021-01-28 RX ADMIN — ALBUTEROL SULFATE 2 PUFF: 108 AEROSOL, METERED RESPIRATORY (INHALATION) at 20:00

## 2021-01-28 RX ADMIN — FERROUS SULFATE TAB 325 MG (65 MG ELEMENTAL FE) 325 MG: 325 (65 FE) TAB at 08:14

## 2021-01-28 RX ADMIN — AMIODARONE HYDROCHLORIDE 200 MG: 200 TABLET ORAL at 08:13

## 2021-01-28 RX ADMIN — INSULIN GLARGINE 5 UNITS: 100 INJECTION, SOLUTION SUBCUTANEOUS at 10:02

## 2021-01-28 RX ADMIN — INSULIN LISPRO 7 UNITS: 100 INJECTION, SOLUTION INTRAVENOUS; SUBCUTANEOUS at 12:00

## 2021-01-28 RX ADMIN — Medication 10 ML: at 14:00

## 2021-01-28 RX ADMIN — ALBUTEROL SULFATE 2 PUFF: 108 AEROSOL, METERED RESPIRATORY (INHALATION) at 08:42

## 2021-01-28 RX ADMIN — ALBUTEROL SULFATE 2 PUFF: 108 AEROSOL, METERED RESPIRATORY (INHALATION) at 13:55

## 2021-01-28 RX ADMIN — DEXAMETHASONE SODIUM PHOSPHATE 6 MG: 10 INJECTION INTRAMUSCULAR; INTRAVENOUS at 22:04

## 2021-01-28 RX ADMIN — INSULIN LISPRO 4 UNITS: 100 INJECTION, SOLUTION INTRAVENOUS; SUBCUTANEOUS at 17:19

## 2021-01-28 RX ADMIN — INSULIN LISPRO 7 UNITS: 100 INJECTION, SOLUTION INTRAVENOUS; SUBCUTANEOUS at 17:21

## 2021-01-28 RX ADMIN — CHOLECALCIFEROL TAB 25 MCG (1000 UNIT) 6000 UNITS: 25 TAB at 08:13

## 2021-01-28 RX ADMIN — Medication 10 ML: at 06:01

## 2021-01-28 NOTE — DIABETES MGMT
Patient admitted with COVID-19. Blood glucose ranged 221-375 yesterday with patient receiving Lantus 60 units, Humalog 53 units, and Decadron 6mg. Blood glucose this morning was 253. Reviewed patient current regimen: Decadron 6mg daily, Lantus 30 units BID, Humalog 7 units with meals, and Humalog SSI. Patient would likely benefit from an increase in basal insulin as fasting blood glucose is not at goal. Updated provider via [employer] regarding recommendations and patient glycemic control.

## 2021-01-28 NOTE — PROGRESS NOTES
Patient resting in bed with no pain or distress noted. Blood sugar is 253 and this nurse gave 6 units of humalog this morning. 02 at 10 liters hi-flow and no breathing difficulty noted. Call light in reach and will prepare report for oncoming nurse.

## 2021-01-28 NOTE — PROGRESS NOTES
Assessment completed and patient not having any pain at this time. 02 at 10 liters High flow with 02 saturation at 94%.  Safety measures in place with call light in reach and will continue to assess

## 2021-01-28 NOTE — PROGRESS NOTES
Progress Note    Patient: Angeline Lopez MRN: [id number]  SSN: [id number]    YOB: 1950  Age: 72 y.o. Sex: male      Admit Date: 1/21/2021    LOS: 7 days     Subjective:   F/U COVID    73 yo hx of DM type II, cor pulmonale, eczema, sCHF EF 55%, paroxysmal a fib on Eliquis, HTN admitted for COVID. Also given doxycycline and ceftriaxone for possible bacterial pneumonia for 7 days. Remdesivir 1/25-1/29. Did not get convalescent plasma. FORTINO seen at 1.71 but now improved to 1.3.     8L NC. Glucose elevated. No chest pain or SOB.  Feeling stronger today  Current Facility-Administered Medications   Medication Dose Route Frequency    albuterol (PROVENTIL HFA, VENTOLIN HFA, PROAIR HFA) inhaler 2 Puff  2 Puff Inhalation Q6H RT    insulin glargine (LANTUS) injection 35 Units  35 Units SubCUTAneous Q12H    doxycycline (VIBRAMYCIN) capsule 100 mg  100 mg Oral Q12H    remdesivir 100 mg in 0.9% sodium chloride 250 mL IVPB  100 mg IntraVENous Q24H    0.9% sodium chloride infusion 50 mL remdesivir flush  50 mL IntraVENous Q24H    insulin lispro (HUMALOG) injection 7 Units  7 Units SubCUTAneous TID WITH MEALS    phenol throat spray (CHLORASEPTIC) 1 Spray  1 Spray Oral PRN    insulin lispro (HUMALOG) injection   SubCUTAneous AC&HS    torsemide (DEMADEX) tablet 20 mg  20 mg Oral DAILY    zinc sulfate (ZINCATE) 220 (50) mg capsule 1 Cap  1 Cap Oral DAILY    ferrous sulfate tablet 325 mg  1 Tab Oral BID WITH MEALS    pantoprazole (PROTONIX) tablet 40 mg  40 mg Oral DAILY    apixaban (ELIQUIS) tablet 5 mg  5 mg Oral BID    metoprolol succinate (TOPROL-XL) XL tablet 100 mg  100 mg Oral DAILY    nystatin (MYCOSTATIN) 100,000 unit/gram powder   Topical QID    pravastatin (PRAVACHOL) tablet 40 mg  40 mg Oral QHS    amiodarone (CORDARONE) tablet 200 mg  200 mg Oral DAILY    ondansetron (ZOFRAN ODT) tablet 4 mg  4 mg Oral Q8H PRN    loratadine (CLARITIN) tablet 10 mg  10 mg Oral DAILY PRN    sodium chloride (NS) flush 5-40 mL  5-40 mL IntraVENous Q8H    sodium chloride (NS) flush 5-40 mL  5-40 mL IntraVENous PRN    acetaminophen (TYLENOL) tablet 650 mg  650 mg Oral Q6H PRN    Or    acetaminophen (TYLENOL) suppository 650 mg  650 mg Rectal Q6H PRN    polyethylene glycol (MIRALAX) packet 17 g  17 g Oral DAILY    senna (SENOKOT) tablet 8.6 mg  1 Tab Oral DAILY PRN    bisacodyL (DULCOLAX) suppository 10 mg  10 mg Rectal DAILY PRN    guaiFENesin-dextromethorphan (ROBITUSSIN DM) 100-10 mg/5 mL syrup 5 mL  5 mL Oral Q4H PRN    [START ON 1/29/2021] cholecalciferol (VITAMIN D3) (1000 Units /25 mcg) tablet 2 Tab  2,000 Units Oral DAILY    dexamethasone (DECADRON) 10 mg/mL injection 6 mg  6 mg IntraVENous Q24H    0.9% sodium chloride infusion 250 mL  250 mL IntraVENous PRN    ascorbic acid (vitamin C) (VITAMIN C) tablet 1,000 mg  1,000 mg Oral DAILY       Objective:     Vitals:    01/27/21 2341 01/28/21 0329 01/28/21 0744 01/28/21 0842   BP: 91/62 (!) 95/58 112/80    Pulse: 80 80 80    Resp: 20 20 18    Temp: 98.2 °F (36.8 °C) 97.4 °F (36.3 °C) 97.8 °F (36.6 °C)    SpO2: 94% 91% 90% 92%   Weight:       Height:             Intake and Output:  Current Shift: 01/28 0701 - 01/28 1900  In: 480 [P.O.:480]  Out: -   Last three shifts: 01/26 1901 - 01/28 0700  In: 240 [P.O.:240]  Out: 1100 [Urine:1100]    Physical Exam:   General:  Alert, cooperative, no distress, appears stated age. More alert and making jokes   Eyes:  Conjunctivae/corneas clear. Ears:  Normal TMs and external ear canals both ears. Nose: Nares normal. Septum midline. Mouth/Throat: Lips, mucosa, and tongue normal.    Neck: no JVD. Back:   deferred   Lungs:   Clear to auscultation bilaterally but limited breathe sounds. Heart:  Regular rate and rhythm, S1, S2 normal   Abdomen:   Soft, non-tender. Bowel sounds normal.    Extremities: Extremities normal, atraumatic, no cyanosis or edema. Pulses: 2+ and symmetric all extremities. Skin: Dry skin to legs   Lymph nodes: Cervical, supraclavicular, and axillary nodes normal.   Neurologic: CNII-XII intact. Limited ROM in bed. Lab/Data Review:    Recent Results (from the past 24 hour(s))   GLUCOSE, POC    Collection Time: 01/27/21 11:48 AM   Result Value Ref Range    Glucose (POC) 325 (H) 65 - 538 mg/dL   METABOLIC PANEL, COMPREHENSIVE    Collection Time: 01/27/21 11:51 AM   Result Value Ref Range    Sodium 138 138 - 145 mmol/L    Potassium 3.9 3.5 - 5.1 mmol/L    Chloride 104 98 - 107 mmol/L    CO2 25 21 - 32 mmol/L    Anion gap 9 7 - 16 mmol/L    Glucose 316 (H) 65 - 100 mg/dL    BUN 56 (H) 8 - 23 MG/DL    Creatinine 1.35 0.8 - 1.5 MG/DL    GFR est AA >60 >60 ml/min/1.73m2    GFR est non-AA 56 (L) >60 ml/min/1.73m2    Calcium 9.1 8.3 - 10.4 MG/DL    Bilirubin, total 0.5 0.2 - 1.1 MG/DL    ALT (SGPT) 131 (H) 12 - 65 U/L    AST (SGOT) 47 (H) 15 - 37 U/L    Alk. phosphatase 105 50 - 136 U/L    Protein, total 7.3 6.3 - 8.2 g/dL    Albumin 2.6 (L) 3.2 - 4.6 g/dL    Globulin 4.7 (H) 2.3 - 3.5 g/dL    A-G Ratio 0.6 (L) 1.2 - 3.5     CBC WITH AUTOMATED DIFF    Collection Time: 01/27/21 11:51 AM   Result Value Ref Range    WBC 13.5 (H) 4.3 - 11.1 K/uL    RBC 6.39 (H) 4.23 - 5.6 M/uL    HGB 16.9 13.6 - 17.2 g/dL    HCT 52.0 (H) 41.1 - 50.3 %    MCV 81.4 79.6 - 97.8 FL    MCH 26.4 26.1 - 32.9 PG    MCHC 32.5 31.4 - 35.0 g/dL    RDW 18.3 (H) 11.9 - 14.6 %    PLATELET 024 088 - 271 K/uL    MPV 10.4 9.4 - 12.3 FL    ABSOLUTE NRBC 0.00 0.0 - 0.2 K/uL    NEUTROPHILS 89 (H) 43 - 78 %    LYMPHOCYTES 3 (L) 13 - 44 %    MONOCYTES 6 4.0 - 12.0 %    EOSINOPHILS 0 (L) 0.5 - 7.8 %    BASOPHILS 0 0.0 - 2.0 %    IMMATURE GRANULOCYTES 2 0.0 - 5.0 %    ABS. NEUTROPHILS 12.0 (H) 1.7 - 8.2 K/UL    ABS. LYMPHOCYTES 0.4 (L) 0.5 - 4.6 K/UL    ABS. MONOCYTES 0.8 0.1 - 1.3 K/UL    ABS. EOSINOPHILS 0.0 0.0 - 0.8 K/UL    ABS. BASOPHILS 0.0 0.0 - 0.2 K/UL    ABS. IMM.  GRANS. 0.3 0.0 - 0.5 K/UL    DF AUTOMATED     D DIMER Collection Time: 01/27/21 11:51 AM   Result Value Ref Range    D DIMER 3.09 (H) <0.56 ug/ml(FEU)   GLUCOSE, POC    Collection Time: 01/27/21  4:39 PM   Result Value Ref Range    Glucose (POC) 359 (H) 65 - 100 mg/dL   PLEASE READ & DOCUMENT PPD TEST IN 48 HRS    Collection Time: 01/27/21  4:55 PM   Result Value Ref Range    PPD Negative Negative    mm Induration 0 0 - 5 mm   GLUCOSE, POC    Collection Time: 01/27/21  8:49 PM   Result Value Ref Range    Glucose (POC) 221 (H) 65 - 100 mg/dL   GLUCOSE, POC    Collection Time: 01/28/21  5:50 AM   Result Value Ref Range    Glucose (POC) 253 (H) 65 - 100 mg/dL       Assessment/ Plan:     Principal Problem:    COVID-19 (1/21/2021)    Active Problems:    Severe sepsis ([facility] [address] (1/23/2021)    COVID with possible bacterial pneumonia - Ceftriaxone for 7 days and doxycycline for total of 6 days. Vitamin C, vitamin D, decadron. Remdesivir with last does tomorrow. Albuterol q 6 hr.     DM type II - lantus 30 units BID that I will increase to 35 units BID, 7 units before meals, SS. Change diet to ADA    HTN- BB    sCHF - compensated. BB. Demadex    A fib - Eliquis, amiodarone. DVT prophylaxis - Eliquis    Looking better.  Hopeful dc by Monday  Signed By: Nellie Davila DO     January 28, 2021

## 2021-01-28 NOTE — PROGRESS NOTES
VSS. Tolerating diet well. Observed sitting up in the chair for most of the shift. Calm but periods of irritability noted. BM noted during the shift. Pt refusing Miralax. Pt now resting quietly in bed with eyes closed and light dimmed. Bed low and locked with call bell within reach.

## 2021-01-28 NOTE — PROGRESS NOTES
ACUTE PHYSICAL THERAPY GOALS:  (Developed with and agreed upon by patient and/or caregiver.)  STG:  (1.)Mr. Bob will move from supine to sit and sit to supine  with MINIMAL ASSIST within 5 treatment day(s).  MET  (2.)Mr. Bob will transfer from bed to chair and chair to bed with CONTACT GUARD ASSIST using the least restrictive device within 5 treatment day(s).  MET  (3.)Mr. Bob will ambulate with MINIMAL ASSIST for 40 feet with the least restrictive device within 5 treatment day(s). MET     LTG:  (1.)Mr. Bob will move from supine to sit and sit to supine  in bed with SUPERVISION within 10 treatment day(s).    (2.)Mr. Bob will transfer from bed to chair and chair to bed with SUPERVISION using the least restrictive device within 10 treatment day(s).    (3.)Mr. Bbo will ambulate with CONTACT GUARD ASSIST for 80 feet with the least restrictive device within 10 treatment day(s). ALMOST MET     PHYSICAL THERAPY: Daily Note and AM Treatment Day # 3    Layne Betancur is a 72 y.o. male   PRIMARY DIAGNOSIS: COVID-19  COVID-19 [U07.1]         ASSESSMENT:     REHAB RECOMMENDATIONS: CURRENT LEVEL OF FUNCTION:  (Most Recently Demonstrated)   Recommendation to date pending progress:  [address] [place] [address]  Equipment:    None Bed Mobility:   Supervision  Sit to Stand:   Standby Assistance  Transfers:   Standby Assistance  Gait/Mobility:   Standby Assistance     ASSESSMENT:  Mr. Melissa Leonard presents in supine again today. Gait remains slow, but he tolerates increased distances today, as well as requires less verbal cues for safe RW use. At end of session, he is up in bedside chair, all needs within reach. Will continue to follow. SUBJECTIVE:   Mr. Melissa Leonard states, \"good to see you again. \"    SOCIAL HISTORY/ LIVING ENVIRONMENT:   Home Environment: Private residence  # Steps to Enter: 0  One/Two Story Residence: One story  Living Alone: No  Support Systems: Spouse/Significant Other/Partner  OBJECTIVE: PAIN: VITAL SIGNS: LINES/DRAINS:   Pre Treatment: Pain Screen  Pain Scale 1: Numeric (0 - 10)  Pain Intensity 1: 0  Post Treatment: 0   IV  O2 Device: Hi flow nasal cannula, Humidifier     MOBILITY: I Mod I S SBA CGA Min Mod Max Total  NT x2 Comments:   Bed Mobility    Rolling [] [] [x] [] [] [] [] [] [] [] []    Supine to Sit [] [] [x] [] [] [] [] [] [] [] []    Scooting [] [] [x] [] [] [] [] [] [] [] []    Sit to Supine [] [] [] [] [] [] [] [] [] [x] []    Transfers    Sit to Stand [] [] [] [x] [] [] [] [] [] [] []    Bed to Chair [] [] [] [x] [] [] [] [] [] [] []    Stand to Sit [] [] [] [x] [] [] [] [] [] [] []    I=Independent, Mod I=Modified Independent, S=Supervision, SBA=Standby Assistance, CGA=Contact Guard Assistance,   Min=Minimal Assistance, Mod=Moderate Assistance, Max=Maximal Assistance, Total=Total Assistance, NT=Not Tested    GAIT: I Mod I S SBA CGA Min Mod Max Total  NT x2 Comments:   Level of Assistance [] [] [] [x] [] [] [] [] [] [] []    Distance 3 x 40'    DME Rolling Walker and Gait Belt    Gait Quality Slow and shuffled    Weightbearing  Status N/A     I=Independent, Mod I=Modified Independent, S=Supervision, SBA=Standby Assistance, CGA=Contact Guard Assistance,   Min=Minimal Assistance, Mod=Moderate Assistance, Max=Maximal Assistance, Total=Total Assistance, NT=Not Tested    PLAN:   FREQUENCY/DURATION: PT Plan of Care: 3 times/week for duration of hospital stay or until stated goals are met, whichever comes first.  TREATMENT:     TREATMENT:   ($$ Therapeutic Activity: 23-37 mins    )  Therapeutic Activity (32 Minutes): Therapeutic activity included Transfer Training and Ambulation on level ground to improve functional Mobility, Strength and Activity tolerance.     AFTER TREATMENT POSITION/PRECAUTIONS:  Chair, Needs within reach and RN notified    INTERDISCIPLINARY COLLABORATION:  RN/PCT and PT/PTA    TOTAL TREATMENT DURATION:  PT Patient Time In/Time Out  Time In: 1008  Time Out: Via Freddy Smith

## 2021-01-28 NOTE — PROGRESS NOTES
Patient is currently on 8 liters of 02 and not medically stable for discharge. PT is recommending HH at discharge. CM following for all discharge needs.

## 2021-01-29 LAB
ALBUMIN SERPL-MCNC: 2.5 G/DL (ref 3.2–4.6)
ALBUMIN/GLOB SERPL: 0.6 {RATIO} (ref 1.2–3.5)
ALP SERPL-CCNC: 100 U/L (ref 50–136)
ALT SERPL-CCNC: 111 U/L (ref 12–65)
ANION GAP SERPL CALC-SCNC: 9 MMOL/L (ref 7–16)
AST SERPL-CCNC: 51 U/L (ref 15–37)
BILIRUB SERPL-MCNC: 0.6 MG/DL (ref 0.2–1.1)
BUN SERPL-MCNC: 49 MG/DL (ref 8–23)
CALCIUM SERPL-MCNC: 8.9 MG/DL (ref 8.3–10.4)
CHLORIDE SERPL-SCNC: 102 MMOL/L (ref 98–107)
CO2 SERPL-SCNC: 27 MMOL/L (ref 21–32)
CREAT SERPL-MCNC: 1.34 MG/DL (ref 0.8–1.5)
GLOBULIN SER CALC-MCNC: 4.2 G/DL (ref 2.3–3.5)
GLUCOSE BLD STRIP.AUTO-MCNC: 167 MG/DL (ref 65–100)
GLUCOSE BLD STRIP.AUTO-MCNC: 217 MG/DL (ref 65–100)
GLUCOSE BLD STRIP.AUTO-MCNC: 232 MG/DL (ref 65–100)
GLUCOSE BLD STRIP.AUTO-MCNC: 251 MG/DL (ref 65–100)
GLUCOSE SERPL-MCNC: 242 MG/DL (ref 65–100)
POTASSIUM SERPL-SCNC: 4.3 MMOL/L (ref 3.5–5.1)
PROT SERPL-MCNC: 6.7 G/DL (ref 6.3–8.2)
SODIUM SERPL-SCNC: 138 MMOL/L (ref 138–145)

## 2021-01-29 PROCEDURE — 74011636637 HC RX REV CODE- 636/637: Performed by: FAMILY MEDICINE

## 2021-01-29 PROCEDURE — 74011636637 HC RX REV CODE- 636/637: Performed by: HOSPITALIST

## 2021-01-29 PROCEDURE — 74011250637 HC RX REV CODE- 250/637: Performed by: HOSPITALIST

## 2021-01-29 PROCEDURE — 74011000258 HC RX REV CODE- 258: Performed by: HOSPITALIST

## 2021-01-29 PROCEDURE — 74011250637 HC RX REV CODE- 250/637: Performed by: INTERNAL MEDICINE

## 2021-01-29 PROCEDURE — 77010033678 HC OXYGEN DAILY

## 2021-01-29 PROCEDURE — 74011250637 HC RX REV CODE- 250/637: Performed by: FAMILY MEDICINE

## 2021-01-29 PROCEDURE — 94640 AIRWAY INHALATION TREATMENT: CPT

## 2021-01-29 PROCEDURE — 65270000029 HC RM PRIVATE

## 2021-01-29 PROCEDURE — 82962 GLUCOSE BLOOD TEST: CPT

## 2021-01-29 PROCEDURE — 80053 COMPREHEN METABOLIC PANEL: CPT

## 2021-01-29 PROCEDURE — 94760 N-INVAS EAR/PLS OXIMETRY 1: CPT

## 2021-01-29 PROCEDURE — 36415 COLL VENOUS BLD VENIPUNCTURE: CPT

## 2021-01-29 PROCEDURE — 74011000250 HC RX REV CODE- 250: Performed by: HOSPITALIST

## 2021-01-29 PROCEDURE — 74011250636 HC RX REV CODE- 250/636: Performed by: HOSPITALIST

## 2021-01-29 RX ORDER — INSULIN LISPRO 100 [IU]/ML
4 INJECTION, SOLUTION INTRAVENOUS; SUBCUTANEOUS
Status: DISCONTINUED | OUTPATIENT
Start: 2021-01-29 | End: 2021-02-01 | Stop reason: HOSPADM

## 2021-01-29 RX ORDER — NYSTATIN 100000 [USP'U]/ML
500000 SUSPENSION ORAL 4 TIMES DAILY
Status: DISCONTINUED | OUTPATIENT
Start: 2021-01-29 | End: 2021-02-01 | Stop reason: HOSPADM

## 2021-01-29 RX ORDER — ALBUTEROL SULFATE 90 UG/1
2 AEROSOL, METERED RESPIRATORY (INHALATION)
Status: DISCONTINUED | OUTPATIENT
Start: 2021-01-29 | End: 2021-02-01 | Stop reason: HOSPADM

## 2021-01-29 RX ADMIN — Medication 10 ML: at 22:03

## 2021-01-29 RX ADMIN — INSULIN GLARGINE 35 UNITS: 100 INJECTION, SOLUTION SUBCUTANEOUS at 09:24

## 2021-01-29 RX ADMIN — NYSTATIN: 100000 POWDER TOPICAL at 12:02

## 2021-01-29 RX ADMIN — OXYCODONE HYDROCHLORIDE AND ACETAMINOPHEN 1000 MG: 500 TABLET ORAL at 09:25

## 2021-01-29 RX ADMIN — ZINC SULFATE 220 MG (50 MG) CAPSULE 1 CAPSULE: CAPSULE at 09:26

## 2021-01-29 RX ADMIN — NYSTATIN 500000 UNITS: 100000 SUSPENSION ORAL at 13:55

## 2021-01-29 RX ADMIN — INSULIN LISPRO 4 UNITS: 100 INJECTION, SOLUTION INTRAVENOUS; SUBCUTANEOUS at 22:02

## 2021-01-29 RX ADMIN — REMDESIVIR 100 MG: 100 INJECTION, POWDER, LYOPHILIZED, FOR SOLUTION INTRAVENOUS at 12:02

## 2021-01-29 RX ADMIN — PRAVASTATIN SODIUM 40 MG: 20 TABLET ORAL at 21:59

## 2021-01-29 RX ADMIN — VITAMIN D, TAB 1000IU (100/BT) 1000 UNITS: 25 TAB at 09:26

## 2021-01-29 RX ADMIN — Medication 10 ML: at 06:00

## 2021-01-29 RX ADMIN — FERROUS SULFATE TAB 325 MG (65 MG ELEMENTAL FE) 325 MG: 325 (65 FE) TAB at 09:25

## 2021-01-29 RX ADMIN — METOPROLOL SUCCINATE 100 MG: 50 TABLET, EXTENDED RELEASE ORAL at 09:26

## 2021-01-29 RX ADMIN — INSULIN LISPRO 2 UNITS: 100 INJECTION, SOLUTION INTRAVENOUS; SUBCUTANEOUS at 06:43

## 2021-01-29 RX ADMIN — ALBUTEROL SULFATE 2 PUFF: 108 AEROSOL, METERED RESPIRATORY (INHALATION) at 08:37

## 2021-01-29 RX ADMIN — NYSTATIN 500000 UNITS: 100000 SUSPENSION ORAL at 10:00

## 2021-01-29 RX ADMIN — SODIUM CHLORIDE 50 ML: 900 INJECTION, SOLUTION INTRAVENOUS at 12:00

## 2021-01-29 RX ADMIN — INSULIN GLARGINE 35 UNITS: 100 INJECTION, SOLUTION SUBCUTANEOUS at 22:00

## 2021-01-29 RX ADMIN — Medication 10 ML: at 13:56

## 2021-01-29 RX ADMIN — INSULIN LISPRO 7 UNITS: 100 INJECTION, SOLUTION INTRAVENOUS; SUBCUTANEOUS at 09:24

## 2021-01-29 RX ADMIN — NYSTATIN 500000 UNITS: 100000 SUSPENSION ORAL at 17:56

## 2021-01-29 RX ADMIN — DEXAMETHASONE SODIUM PHOSPHATE 6 MG: 10 INJECTION INTRAMUSCULAR; INTRAVENOUS at 22:03

## 2021-01-29 RX ADMIN — INSULIN LISPRO 4 UNITS: 100 INJECTION, SOLUTION INTRAVENOUS; SUBCUTANEOUS at 11:57

## 2021-01-29 RX ADMIN — TORSEMIDE 20 MG: 20 TABLET ORAL at 09:26

## 2021-01-29 RX ADMIN — FERROUS SULFATE TAB 325 MG (65 MG ELEMENTAL FE) 325 MG: 325 (65 FE) TAB at 16:05

## 2021-01-29 RX ADMIN — DOXYCYCLINE HYCLATE 100 MG: 100 CAPSULE ORAL at 09:26

## 2021-01-29 RX ADMIN — ALBUTEROL SULFATE 2 PUFF: 108 AEROSOL, METERED RESPIRATORY (INHALATION) at 03:55

## 2021-01-29 RX ADMIN — ALBUTEROL SULFATE 2 PUFF: 108 AEROSOL, METERED RESPIRATORY (INHALATION) at 20:22

## 2021-01-29 RX ADMIN — NYSTATIN: 100000 POWDER TOPICAL at 17:56

## 2021-01-29 RX ADMIN — INSULIN LISPRO 4 UNITS: 100 INJECTION, SOLUTION INTRAVENOUS; SUBCUTANEOUS at 11:55

## 2021-01-29 RX ADMIN — AMIODARONE HYDROCHLORIDE 200 MG: 200 TABLET ORAL at 09:26

## 2021-01-29 RX ADMIN — APIXABAN 5 MG: 5 TABLET, FILM COATED ORAL at 09:26

## 2021-01-29 RX ADMIN — INSULIN LISPRO 4 UNITS: 100 INJECTION, SOLUTION INTRAVENOUS; SUBCUTANEOUS at 16:04

## 2021-01-29 RX ADMIN — NYSTATIN 500000 UNITS: 100000 SUSPENSION ORAL at 22:03

## 2021-01-29 RX ADMIN — NYSTATIN: 100000 POWDER TOPICAL at 09:25

## 2021-01-29 RX ADMIN — PANTOPRAZOLE SODIUM 40 MG: 40 TABLET, DELAYED RELEASE ORAL at 09:25

## 2021-01-29 RX ADMIN — DOXYCYCLINE HYCLATE 100 MG: 100 CAPSULE ORAL at 21:59

## 2021-01-29 RX ADMIN — INSULIN LISPRO 6 UNITS: 100 INJECTION, SOLUTION INTRAVENOUS; SUBCUTANEOUS at 16:03

## 2021-01-29 RX ADMIN — APIXABAN 5 MG: 5 TABLET, FILM COATED ORAL at 21:59

## 2021-01-29 RX ADMIN — NYSTATIN: 100000 POWDER TOPICAL at 22:02

## 2021-01-29 NOTE — PROGRESS NOTES
Progress Note    Patient: Richie Cevallos MRN: [id number]  SSN: [id number]    YOB: 1950  Age: 72 y.o. Sex: male      Admit Date: 1/21/2021    LOS: 8 days     Subjective:   F/U COVID    71 yo hx of DM type II, cor pulmonale, eczema, sCHF EF 55%, paroxysmal a fib on Eliquis, HTN admitted for COVID. Also given doxycycline and ceftriaxone for possible bacterial pneumonia for 7 days. Remdesivir 1/25-1/29. Did not get convalescent plasma. FORTINO seen at 1.71 but now improved to 1.3.     8L NC. Glucose improved. No chest pain or SOB.  Feeling stronger today  Current Facility-Administered Medications   Medication Dose Route Frequency    albuterol (PROVENTIL HFA, VENTOLIN HFA, PROAIR HFA) inhaler 2 Puff  2 Puff Inhalation Q6H RT    insulin glargine (LANTUS) injection 35 Units  35 Units SubCUTAneous Q12H    doxycycline (VIBRAMYCIN) capsule 100 mg  100 mg Oral Q12H    cholecalciferol (VITAMIN D3) (1000 Units /25 mcg) tablet 1,000 Units  1,000 Units Oral DAILY    remdesivir 100 mg in 0.9% sodium chloride 250 mL IVPB  100 mg IntraVENous Q24H    0.9% sodium chloride infusion 50 mL remdesivir flush  50 mL IntraVENous Q24H    insulin lispro (HUMALOG) injection 7 Units  7 Units SubCUTAneous TID WITH MEALS    phenol throat spray (CHLORASEPTIC) 1 Spray  1 Spray Oral PRN    insulin lispro (HUMALOG) injection   SubCUTAneous AC&HS    torsemide (DEMADEX) tablet 20 mg  20 mg Oral DAILY    zinc sulfate (ZINCATE) 220 (50) mg capsule 1 Cap  1 Cap Oral DAILY    ferrous sulfate tablet 325 mg  1 Tab Oral BID WITH MEALS    pantoprazole (PROTONIX) tablet 40 mg  40 mg Oral DAILY    apixaban (ELIQUIS) tablet 5 mg  5 mg Oral BID    metoprolol succinate (TOPROL-XL) XL tablet 100 mg  100 mg Oral DAILY    nystatin (MYCOSTATIN) 100,000 unit/gram powder   Topical QID    pravastatin (PRAVACHOL) tablet 40 mg  40 mg Oral QHS    amiodarone (CORDARONE) tablet 200 mg  200 mg Oral DAILY    ondansetron (ZOFRAN ODT) tablet 4 mg  4 mg Oral Q8H PRN    loratadine (CLARITIN) tablet 10 mg  10 mg Oral DAILY PRN    sodium chloride (NS) flush 5-40 mL  5-40 mL IntraVENous Q8H    sodium chloride (NS) flush 5-40 mL  5-40 mL IntraVENous PRN    acetaminophen (TYLENOL) tablet 650 mg  650 mg Oral Q6H PRN    Or    acetaminophen (TYLENOL) suppository 650 mg  650 mg Rectal Q6H PRN    polyethylene glycol (MIRALAX) packet 17 g  17 g Oral DAILY    senna (SENOKOT) tablet 8.6 mg  1 Tab Oral DAILY PRN    bisacodyL (DULCOLAX) suppository 10 mg  10 mg Rectal DAILY PRN    guaiFENesin-dextromethorphan (ROBITUSSIN DM) 100-10 mg/5 mL syrup 5 mL  5 mL Oral Q4H PRN    dexamethasone (DECADRON) 10 mg/mL injection 6 mg  6 mg IntraVENous Q24H    0.9% sodium chloride infusion 250 mL  250 mL IntraVENous PRN    ascorbic acid (vitamin C) (VITAMIN C) tablet 1,000 mg  1,000 mg Oral DAILY       Objective:     Vitals:    01/29/21 0417 01/29/21 0741 01/29/21 0837 01/29/21 0917   BP: 97/65 (!) 88/64  99/62   Pulse: 81 81  90   Resp: 20 19     Temp: 97.5 °F (36.4 °C) 97.7 °F (36.5 °C)     SpO2: 90% 95% 93% 98%   Weight:       Height:             Intake and Output:  Current Shift: No intake/output data recorded. Last three shifts: 01/27 1901 - 01/29 0700  In: 1680 [P.O.:1680]  Out: -     Physical Exam:   General:  Alert, cooperative, no distress   Eyes:  Conjunctivae/corneas clear. Ears:  Normal TMs and external ear canals both ears. Nose: Nares normal. Septum midline. Mouth/Throat: Yeast on tongue   Neck: no JVD. Back:   deferred   Lungs:   Clear to auscultation bilaterally but limited breathe sounds. Heart:  Regular rate and rhythm, S1, S2 normal   Abdomen:   Soft, non-tender. Bowel sounds normal.    Extremities: Extremities normal, atraumatic, no cyanosis or edema. Pulses: 2+ and symmetric all extremities. Skin: Dry skin to legs   Lymph nodes: Cervical, supraclavicular, and axillary nodes normal.   Neurologic: CNII-XII intact.  Limited ROM in bed.        Lab/Data Review:    Recent Results (from the past 24 hour(s))   GLUCOSE, POC    Collection Time: 01/28/21 11:28 AM   Result Value Ref Range    Glucose (POC) 333 (H) 65 - 100 mg/dL   GLUCOSE, POC    Collection Time: 01/28/21  4:39 PM   Result Value Ref Range    Glucose (POC) 238 (H) 65 - 100 mg/dL   GLUCOSE, POC    Collection Time: 01/28/21  9:10 PM   Result Value Ref Range    Glucose (POC) 197 (H) 65 - 100 mg/dL   GLUCOSE, POC    Collection Time: 01/29/21  5:53 AM   Result Value Ref Range    Glucose (POC) 167 (H) 65 - 100 mg/dL       Assessment/ Plan:     Principal Problem:    COVID-19 (1/21/2021)    Active Problems:    Severe sepsis ([facility] [address] (1/23/2021)    COVID with possible bacterial pneumonia - Ceftriaxone for 7 days and doxycycline for total of 6 days. Vitamin C, vitamin D, decadron. Remdesivir with last dose today. Albuterol q 6 hr.     DM type II - lantus 35 units BID, 7 units before meals that I will reduce to 4 units, SS. Change diet to ADA    HTN- BB    sCHF - compensated. BB. Demadex    A fib - Eliquis, amiodarone. DVT prophylaxis - Eliquis    Looking better. Hopeful dc by Monday if can wean down oxygen supplement. Will dc with HH.    Signed By: Bri Garvey DO     January 29, 2021

## 2021-01-29 NOTE — PROGRESS NOTES
PT Daily Note:  Attempted to see patient for physical therapy this morning but patient adamantly refused to participate stating, \"I don't feel like it\". Encouraged patient to participate but he continued to refuse. Will check back on patient at a later date/time if schedule permits.   Thank you,  Jean-Claude Maharaj, PTA

## 2021-01-29 NOTE — PROGRESS NOTES
Oxygen ordered at 7L HFNC  to maintain SaO2>90% per Oxygen protocol. Albuterol HFA changed to bid & q6 prn per respiratory protocol.

## 2021-01-29 NOTE — PROGRESS NOTES
Comprehensive Nutrition Assessment  This assessment was completed remotely from within the hospital. Patient consent was obtained for remote assessment. Type and Reason for Visit: Initial, RD nutrition re-screen/LOS  LOS day 7    Nutrition Recommendations/Plan:    Continue with current diet        Nutrition Assessment:   Nutrition Background: history significant for diabetes, hypertensions, obesity, recently diagnosed with COVID-19, patient started developing shortness of breath, vomiting, diarrhea. Upon admission, patient was found to be hypoxic and dehydrated  Daily Update:  Spoke with patient over phone. He reports he is eating majority of meals but is not always that hungry. He reports he does not feel like eating sometimes but does not skip meals. Per EMR eating % of most meals. He reports a little nausea at times. He reports he is not having any difficulty eating and breathing or pain. Nutrition Related Findings:   Defered at this time due to isolation status      Current Nutrition Therapies:  DIET CARDIAC Regular; 2 GM NA (House Low NA); Consistent Carb 1500-1600kcal    Current Intake:   Average Meal Intake: % Average Supplement Intake: None ordered      Anthropometric Measures:  Height: 5' 9\" (175.3 cm)  Current Body Wt: 94 kg (207 lb 3.7 oz), Weight source: Bed scale  BMI: 30.6, Obese class 1 (BMI 30.0-34. 9)  Admission Body Weight: 210 lb 8.6 oz  Ideal Body Wt: 160 lbs (73 kg), 129.5 %          Estimated Daily Nutrient Needs:  Energy (kcal/day): 8087-8654 kcal/day (20-25 kcal/kg) (Kcal/kg, Weight Used: Current)  Protein (g/day):  g/day (20% kcals)   Fluid (ml/day):   (1 ml/kcal)    Nutrition Diagnosis:   No nutrition diagnosis at this time     Nutrition Interventions:   Food and/or Nutrient Delivery: Continue current diet     Coordination of Nutrition Care: Continue to monitor while inpatient    Goals:       Active Goal: Continue intake >75% of nutritional needs for 7 days    Nutrition Monitoring and Evaluation:      Food/Nutrient Intake Outcomes: Food and nutrient intake     Discharge Planning:    No discharge needs at this time    Electronically signed by Michael Schwartz MS, RD, LD on 1/29/2021 at 10:42 AM.  Contact: [phone]    Disaster Mode active

## 2021-01-29 NOTE — DIABETES MGMT
Patient's blood glucose ranged 197-333 yesterday with patient receiving Lantus 70 units, Humalog 41 units, and dexamethasone 6 mg. Blood glucose 167 this morning. Current regimen: Lantus 35 units Q12, Humalog 7 units with meals, and Humalog SSI. Dexamethasone currently scheduled to stop 1/31 and insulins will need to adjusted accordingly at that time.

## 2021-01-29 NOTE — PROGRESS NOTES
Assessment completed and patient resting in bed with no pain or distress noted. 02 at 7 liters via NC with 02 at 92%.   Call light in reach

## 2021-01-29 NOTE — PROGRESS NOTES
Pt observed resting quietly in bed. He refused PT today. Tolerating diet well. Some hypotension that the MD was notified about. It has since been resolved. Bed low and locked with call bell within reach.

## 2021-01-29 NOTE — PROGRESS NOTES
Patient in bed with no pain or distress noted. 02 at 7 liters and 02 saturation is 94%. Call light in reach and will prepare report for oncoming nurse.

## 2021-01-30 LAB
GLUCOSE BLD STRIP.AUTO-MCNC: 160 MG/DL (ref 65–100)
GLUCOSE BLD STRIP.AUTO-MCNC: 162 MG/DL (ref 65–100)
GLUCOSE BLD STRIP.AUTO-MCNC: 215 MG/DL (ref 65–100)
GLUCOSE BLD STRIP.AUTO-MCNC: 232 MG/DL (ref 65–100)

## 2021-01-30 PROCEDURE — 94760 N-INVAS EAR/PLS OXIMETRY 1: CPT

## 2021-01-30 PROCEDURE — 74011636637 HC RX REV CODE- 636/637: Performed by: HOSPITALIST

## 2021-01-30 PROCEDURE — 77010033678 HC OXYGEN DAILY

## 2021-01-30 PROCEDURE — 74011250637 HC RX REV CODE- 250/637: Performed by: FAMILY MEDICINE

## 2021-01-30 PROCEDURE — 65270000029 HC RM PRIVATE

## 2021-01-30 PROCEDURE — 82962 GLUCOSE BLOOD TEST: CPT

## 2021-01-30 PROCEDURE — 74011250637 HC RX REV CODE- 250/637: Performed by: INTERNAL MEDICINE

## 2021-01-30 PROCEDURE — 74011250637 HC RX REV CODE- 250/637: Performed by: HOSPITALIST

## 2021-01-30 PROCEDURE — 74011250636 HC RX REV CODE- 250/636: Performed by: HOSPITALIST

## 2021-01-30 PROCEDURE — 74011636637 HC RX REV CODE- 636/637: Performed by: FAMILY MEDICINE

## 2021-01-30 PROCEDURE — 2709999900 HC NON-CHARGEABLE SUPPLY

## 2021-01-30 PROCEDURE — 94640 AIRWAY INHALATION TREATMENT: CPT

## 2021-01-30 RX ADMIN — APIXABAN 5 MG: 5 TABLET, FILM COATED ORAL at 08:58

## 2021-01-30 RX ADMIN — FERROUS SULFATE TAB 325 MG (65 MG ELEMENTAL FE) 325 MG: 325 (65 FE) TAB at 08:58

## 2021-01-30 RX ADMIN — Medication 10 ML: at 05:54

## 2021-01-30 RX ADMIN — NYSTATIN: 100000 POWDER TOPICAL at 22:00

## 2021-01-30 RX ADMIN — NYSTATIN: 100000 POWDER TOPICAL at 13:00

## 2021-01-30 RX ADMIN — FERROUS SULFATE TAB 325 MG (65 MG ELEMENTAL FE) 325 MG: 325 (65 FE) TAB at 17:28

## 2021-01-30 RX ADMIN — INSULIN LISPRO 4 UNITS: 100 INJECTION, SOLUTION INTRAVENOUS; SUBCUTANEOUS at 17:28

## 2021-01-30 RX ADMIN — ALBUTEROL SULFATE 2 PUFF: 108 AEROSOL, METERED RESPIRATORY (INHALATION) at 20:12

## 2021-01-30 RX ADMIN — APIXABAN 5 MG: 5 TABLET, FILM COATED ORAL at 21:43

## 2021-01-30 RX ADMIN — INSULIN LISPRO 4 UNITS: 100 INJECTION, SOLUTION INTRAVENOUS; SUBCUTANEOUS at 09:11

## 2021-01-30 RX ADMIN — PRAVASTATIN SODIUM 40 MG: 20 TABLET ORAL at 21:43

## 2021-01-30 RX ADMIN — AMIODARONE HYDROCHLORIDE 200 MG: 200 TABLET ORAL at 08:58

## 2021-01-30 RX ADMIN — INSULIN LISPRO 4 UNITS: 100 INJECTION, SOLUTION INTRAVENOUS; SUBCUTANEOUS at 21:40

## 2021-01-30 RX ADMIN — NYSTATIN 500000 UNITS: 100000 SUSPENSION ORAL at 09:00

## 2021-01-30 RX ADMIN — METOPROLOL SUCCINATE 100 MG: 50 TABLET, EXTENDED RELEASE ORAL at 08:57

## 2021-01-30 RX ADMIN — VITAMIN D, TAB 1000IU (100/BT) 1000 UNITS: 25 TAB at 08:57

## 2021-01-30 RX ADMIN — NYSTATIN 500000 UNITS: 100000 SUSPENSION ORAL at 21:42

## 2021-01-30 RX ADMIN — DEXAMETHASONE SODIUM PHOSPHATE 6 MG: 10 INJECTION INTRAMUSCULAR; INTRAVENOUS at 21:43

## 2021-01-30 RX ADMIN — DOXYCYCLINE HYCLATE 100 MG: 100 CAPSULE ORAL at 21:43

## 2021-01-30 RX ADMIN — INSULIN LISPRO 2 UNITS: 100 INJECTION, SOLUTION INTRAVENOUS; SUBCUTANEOUS at 05:53

## 2021-01-30 RX ADMIN — NYSTATIN: 100000 POWDER TOPICAL at 08:58

## 2021-01-30 RX ADMIN — DOXYCYCLINE HYCLATE 100 MG: 100 CAPSULE ORAL at 08:57

## 2021-01-30 RX ADMIN — INSULIN LISPRO 2 UNITS: 100 INJECTION, SOLUTION INTRAVENOUS; SUBCUTANEOUS at 17:28

## 2021-01-30 RX ADMIN — ZINC SULFATE 220 MG (50 MG) CAPSULE 1 CAPSULE: CAPSULE at 09:01

## 2021-01-30 RX ADMIN — INSULIN GLARGINE 35 UNITS: 100 INJECTION, SOLUTION SUBCUTANEOUS at 21:41

## 2021-01-30 RX ADMIN — PANTOPRAZOLE SODIUM 40 MG: 40 TABLET, DELAYED RELEASE ORAL at 09:01

## 2021-01-30 RX ADMIN — OXYCODONE HYDROCHLORIDE AND ACETAMINOPHEN 1000 MG: 500 TABLET ORAL at 08:57

## 2021-01-30 RX ADMIN — Medication 10 ML: at 21:44

## 2021-01-30 RX ADMIN — TORSEMIDE 20 MG: 20 TABLET ORAL at 09:01

## 2021-01-30 RX ADMIN — INSULIN LISPRO 4 UNITS: 100 INJECTION, SOLUTION INTRAVENOUS; SUBCUTANEOUS at 11:30

## 2021-01-30 RX ADMIN — INSULIN GLARGINE 35 UNITS: 100 INJECTION, SOLUTION SUBCUTANEOUS at 09:12

## 2021-01-30 RX ADMIN — ALBUTEROL SULFATE 2 PUFF: 108 AEROSOL, METERED RESPIRATORY (INHALATION) at 08:20

## 2021-01-30 RX ADMIN — Medication 10 ML: at 09:13

## 2021-01-30 NOTE — PROGRESS NOTES
Assessment completed and patient resting in bed with no pain or distress. 02 at 8 liters via High flow NC with 02 saturation at 92%.   Call light in reach and will continue to assess

## 2021-01-30 NOTE — PROGRESS NOTES
Progress Note    Patient: Nadira Antunez MRN: [id number]  SSN: [id number]    YOB: 1950  Age: 72 y.o. Sex: male      Admit Date: 1/21/2021    LOS: 9 days     Subjective:   F/U COVID    71 yo hx of DM type II, cor pulmonale, eczema, sCHF EF 55%, paroxysmal a fib on Eliquis, HTN admitted for COVID. Also given doxycycline and ceftriaxone for possible bacterial pneumonia for 7 days. Remdesivir 1/25-1/29. Did not get convalescent plasma. FORTINO seen at 1.71 but now improved to 1.3.     8L NC. Glucose improved. No chest pain or SOB.    Current Facility-Administered Medications   Medication Dose Route Frequency    insulin lispro (HUMALOG) injection 4 Units  4 Units SubCUTAneous TID WITH MEALS    nystatin (MYCOSTATIN) 100,000 unit/mL oral suspension 500,000 Units  500,000 Units Oral QID    albuterol (PROVENTIL HFA, VENTOLIN HFA, PROAIR HFA) inhaler 2 Puff  2 Puff Inhalation Q6H PRN    albuterol (PROVENTIL HFA, VENTOLIN HFA, PROAIR HFA) inhaler 2 Puff  2 Puff Inhalation BID RT    insulin glargine (LANTUS) injection 35 Units  35 Units SubCUTAneous Q12H    doxycycline (VIBRAMYCIN) capsule 100 mg  100 mg Oral Q12H    cholecalciferol (VITAMIN D3) (1000 Units /25 mcg) tablet 1,000 Units  1,000 Units Oral DAILY    phenol throat spray (CHLORASEPTIC) 1 Spray  1 Spray Oral PRN    insulin lispro (HUMALOG) injection   SubCUTAneous AC&HS    torsemide (DEMADEX) tablet 20 mg  20 mg Oral DAILY    zinc sulfate (ZINCATE) 220 (50) mg capsule 1 Cap  1 Cap Oral DAILY    ferrous sulfate tablet 325 mg  1 Tab Oral BID WITH MEALS    pantoprazole (PROTONIX) tablet 40 mg  40 mg Oral DAILY    apixaban (ELIQUIS) tablet 5 mg  5 mg Oral BID    metoprolol succinate (TOPROL-XL) XL tablet 100 mg  100 mg Oral DAILY    nystatin (MYCOSTATIN) 100,000 unit/gram powder   Topical QID    pravastatin (PRAVACHOL) tablet 40 mg  40 mg Oral QHS    amiodarone (CORDARONE) tablet 200 mg  200 mg Oral DAILY    ondansetron (ZOFRAN ODT) tablet 4 mg  4 mg Oral Q8H PRN    loratadine (CLARITIN) tablet 10 mg  10 mg Oral DAILY PRN    sodium chloride (NS) flush 5-40 mL  5-40 mL IntraVENous Q8H    sodium chloride (NS) flush 5-40 mL  5-40 mL IntraVENous PRN    acetaminophen (TYLENOL) tablet 650 mg  650 mg Oral Q6H PRN    Or    acetaminophen (TYLENOL) suppository 650 mg  650 mg Rectal Q6H PRN    polyethylene glycol (MIRALAX) packet 17 g  17 g Oral DAILY    senna (SENOKOT) tablet 8.6 mg  1 Tab Oral DAILY PRN    bisacodyL (DULCOLAX) suppository 10 mg  10 mg Rectal DAILY PRN    guaiFENesin-dextromethorphan (ROBITUSSIN DM) 100-10 mg/5 mL syrup 5 mL  5 mL Oral Q4H PRN    dexamethasone (DECADRON) 10 mg/mL injection 6 mg  6 mg IntraVENous Q24H    0.9% sodium chloride infusion 250 mL  250 mL IntraVENous PRN    ascorbic acid (vitamin C) (VITAMIN C) tablet 1,000 mg  1,000 mg Oral DAILY       Objective:     Vitals:    01/30/21 0414 01/30/21 0436 01/30/21 0731 01/30/21 0820   BP: 99/77  96/65    Pulse: 82  80    Resp: 19  19    Temp: 97.9 °F (36.6 °C)  97.9 °F (36.6 °C)    SpO2: 94%  95% 94%   Weight:  91.5 kg (201 lb 11.5 oz)     Height:             Intake and Output:  Current Shift: No intake/output data recorded. Last three shifts: 01/28 1901 - 01/30 0700  In: 2160 [P.O.:2160]  Out: -     Physical Exam:   General:  Alert, cooperative, no distress. Very pleasant and making jokes. Eyes:  Conjunctivae/corneas clear. Ears:  Normal TMs and external ear canals both ears. Nose: Nares normal. Septum midline. Mouth/Throat: Yeast on tongue   Neck: no JVD. Back:   deferred   Lungs:   Clear to auscultation bilaterally but limited breathe sounds. Heart:  Regular rate and rhythm, S1, S2 normal   Abdomen:   Soft, non-tender. Bowel sounds normal.    Extremities: Extremities normal, atraumatic, no cyanosis or edema. Pulses: 2+ and symmetric all extremities.    Skin: Dry skin to legs   Lymph nodes: Cervical, supraclavicular, and axillary nodes normal.   Neurologic: CNII-XII intact. Limited ROM in bed. Lab/Data Review:    Recent Results (from the past 24 hour(s))   GLUCOSE, POC    Collection Time: 01/29/21 11:08 AM   Result Value Ref Range    Glucose (POC) 232 (H) 65 - 297 mg/dL   METABOLIC PANEL, COMPREHENSIVE    Collection Time: 01/29/21  1:49 PM   Result Value Ref Range    Sodium 138 138 - 145 mmol/L    Potassium 4.3 3.5 - 5.1 mmol/L    Chloride 102 98 - 107 mmol/L    CO2 27 21 - 32 mmol/L    Anion gap 9 7 - 16 mmol/L    Glucose 242 (H) 65 - 100 mg/dL    BUN 49 (H) 8 - 23 MG/DL    Creatinine 1.34 0.8 - 1.5 MG/DL    GFR est AA >60 >60 ml/min/1.73m2    GFR est non-AA 56 (L) >60 ml/min/1.73m2    Calcium 8.9 8.3 - 10.4 MG/DL    Bilirubin, total 0.6 0.2 - 1.1 MG/DL    ALT (SGPT) 111 (H) 12 - 65 U/L    AST (SGOT) 51 (H) 15 - 37 U/L    Alk. phosphatase 100 50 - 136 U/L    Protein, total 6.7 6.3 - 8.2 g/dL    Albumin 2.5 (L) 3.2 - 4.6 g/dL    Globulin 4.2 (H) 2.3 - 3.5 g/dL    A-G Ratio 0.6 (L) 1.2 - 3.5     GLUCOSE, POC    Collection Time: 01/29/21  3:40 PM   Result Value Ref Range    Glucose (POC) 251 (H) 65 - 100 mg/dL   GLUCOSE, POC    Collection Time: 01/29/21  8:40 PM   Result Value Ref Range    Glucose (POC) 217 (H) 65 - 100 mg/dL   GLUCOSE, POC    Collection Time: 01/30/21  5:47 AM   Result Value Ref Range    Glucose (POC) 162 (H) 65 - 100 mg/dL       Assessment/ Plan:     Principal Problem:    COVID-19 (1/21/2021)    Active Problems:    Severe sepsis ([address] (1/23/2021)    COVID with possible bacterial pneumonia - Ceftriaxone for 7 days and doxycycline for total of 6 days. Vitamin C, vitamin D, decadron. Remdesivir with last dose today. Albuterol q 12 hr.     DM type II - lantus 35 units BID, 4 units before meals, SS. Change diet to ADA    HTN- BB    sCHF - compensated. BB. Demadex    A fib - Eliquis, amiodarone. DVT prophylaxis - Eliquis    Looking better. Hopeful dc by Monday if can wean down oxygen supplement. Will dc with HH.    Signed By: Sallie Edwards,      January 30, 2021

## 2021-01-30 NOTE — PROGRESS NOTES
Patient resting in bed with no pain or distress noted. Blood sugar is 162 and 2 units of humalog given. 02 on at 8 liters without any breathing difficulty. Safety measures in place with call light in reach and will prepare report for oncoming nurse.

## 2021-01-31 LAB
GLUCOSE BLD STRIP.AUTO-MCNC: 156 MG/DL (ref 65–100)
GLUCOSE BLD STRIP.AUTO-MCNC: 204 MG/DL (ref 65–100)
GLUCOSE BLD STRIP.AUTO-MCNC: 253 MG/DL (ref 65–100)
GLUCOSE BLD STRIP.AUTO-MCNC: 263 MG/DL (ref 65–100)

## 2021-01-31 PROCEDURE — 74011250637 HC RX REV CODE- 250/637: Performed by: FAMILY MEDICINE

## 2021-01-31 PROCEDURE — 74011636637 HC RX REV CODE- 636/637: Performed by: FAMILY MEDICINE

## 2021-01-31 PROCEDURE — 74011636637 HC RX REV CODE- 636/637: Performed by: HOSPITALIST

## 2021-01-31 PROCEDURE — 94760 N-INVAS EAR/PLS OXIMETRY 1: CPT

## 2021-01-31 PROCEDURE — 74011250637 HC RX REV CODE- 250/637: Performed by: HOSPITALIST

## 2021-01-31 PROCEDURE — 77010033678 HC OXYGEN DAILY

## 2021-01-31 PROCEDURE — 94640 AIRWAY INHALATION TREATMENT: CPT

## 2021-01-31 PROCEDURE — 82962 GLUCOSE BLOOD TEST: CPT

## 2021-01-31 PROCEDURE — 97530 THERAPEUTIC ACTIVITIES: CPT

## 2021-01-31 PROCEDURE — 74011250637 HC RX REV CODE- 250/637: Performed by: INTERNAL MEDICINE

## 2021-01-31 PROCEDURE — 65270000029 HC RM PRIVATE

## 2021-01-31 RX ADMIN — AMIODARONE HYDROCHLORIDE 200 MG: 200 TABLET ORAL at 09:32

## 2021-01-31 RX ADMIN — ZINC SULFATE 220 MG (50 MG) CAPSULE 1 CAPSULE: CAPSULE at 09:32

## 2021-01-31 RX ADMIN — POLYETHYLENE GLYCOL 3350 17 G: 17 POWDER, FOR SOLUTION ORAL at 09:31

## 2021-01-31 RX ADMIN — INSULIN LISPRO 4 UNITS: 100 INJECTION, SOLUTION INTRAVENOUS; SUBCUTANEOUS at 12:16

## 2021-01-31 RX ADMIN — NYSTATIN: 100000 POWDER TOPICAL at 09:34

## 2021-01-31 RX ADMIN — Medication 10 ML: at 05:44

## 2021-01-31 RX ADMIN — INSULIN LISPRO 4 UNITS: 100 INJECTION, SOLUTION INTRAVENOUS; SUBCUTANEOUS at 12:17

## 2021-01-31 RX ADMIN — INSULIN LISPRO 2 UNITS: 100 INJECTION, SOLUTION INTRAVENOUS; SUBCUTANEOUS at 05:42

## 2021-01-31 RX ADMIN — FERROUS SULFATE TAB 325 MG (65 MG ELEMENTAL FE) 325 MG: 325 (65 FE) TAB at 17:19

## 2021-01-31 RX ADMIN — APIXABAN 5 MG: 5 TABLET, FILM COATED ORAL at 09:32

## 2021-01-31 RX ADMIN — ALBUTEROL SULFATE 2 PUFF: 108 AEROSOL, METERED RESPIRATORY (INHALATION) at 09:55

## 2021-01-31 RX ADMIN — Medication 10 ML: at 21:25

## 2021-01-31 RX ADMIN — PRAVASTATIN SODIUM 40 MG: 20 TABLET ORAL at 21:24

## 2021-01-31 RX ADMIN — INSULIN LISPRO 4 UNITS: 100 INJECTION, SOLUTION INTRAVENOUS; SUBCUTANEOUS at 08:00

## 2021-01-31 RX ADMIN — ONDANSETRON 4 MG: 4 TABLET, ORALLY DISINTEGRATING ORAL at 05:42

## 2021-01-31 RX ADMIN — NYSTATIN 500000 UNITS: 100000 SUSPENSION ORAL at 21:24

## 2021-01-31 RX ADMIN — INSULIN GLARGINE 35 UNITS: 100 INJECTION, SOLUTION SUBCUTANEOUS at 09:33

## 2021-01-31 RX ADMIN — NYSTATIN: 100000 POWDER TOPICAL at 21:24

## 2021-01-31 RX ADMIN — DOXYCYCLINE HYCLATE 100 MG: 100 CAPSULE ORAL at 09:32

## 2021-01-31 RX ADMIN — PANTOPRAZOLE SODIUM 40 MG: 40 TABLET, DELAYED RELEASE ORAL at 09:32

## 2021-01-31 RX ADMIN — TORSEMIDE 20 MG: 20 TABLET ORAL at 09:32

## 2021-01-31 RX ADMIN — INSULIN GLARGINE 35 UNITS: 100 INJECTION, SOLUTION SUBCUTANEOUS at 21:25

## 2021-01-31 RX ADMIN — Medication 10 ML: at 09:34

## 2021-01-31 RX ADMIN — APIXABAN 5 MG: 5 TABLET, FILM COATED ORAL at 21:24

## 2021-01-31 RX ADMIN — ALBUTEROL SULFATE 2 PUFF: 108 AEROSOL, METERED RESPIRATORY (INHALATION) at 21:19

## 2021-01-31 RX ADMIN — INSULIN LISPRO 6 UNITS: 100 INJECTION, SOLUTION INTRAVENOUS; SUBCUTANEOUS at 17:20

## 2021-01-31 RX ADMIN — METOPROLOL SUCCINATE 100 MG: 50 TABLET, EXTENDED RELEASE ORAL at 09:32

## 2021-01-31 RX ADMIN — OXYCODONE HYDROCHLORIDE AND ACETAMINOPHEN 1000 MG: 500 TABLET ORAL at 09:31

## 2021-01-31 RX ADMIN — VITAMIN D, TAB 1000IU (100/BT) 1000 UNITS: 25 TAB at 09:32

## 2021-01-31 RX ADMIN — NYSTATIN 500000 UNITS: 100000 SUSPENSION ORAL at 17:21

## 2021-01-31 RX ADMIN — INSULIN LISPRO 6 UNITS: 100 INJECTION, SOLUTION INTRAVENOUS; SUBCUTANEOUS at 21:25

## 2021-01-31 RX ADMIN — INSULIN LISPRO 4 UNITS: 100 INJECTION, SOLUTION INTRAVENOUS; SUBCUTANEOUS at 17:20

## 2021-01-31 RX ADMIN — FERROUS SULFATE TAB 325 MG (65 MG ELEMENTAL FE) 325 MG: 325 (65 FE) TAB at 09:32

## 2021-01-31 NOTE — PROGRESS NOTES
ACUTE PHYSICAL THERAPY GOALS:  (Developed with and agreed upon by patient and/or caregiver.)  STG:  (1.)Mr. Bob will move from supine to sit and sit to supine  with MINIMAL ASSIST within 5 treatment day(s).  MET  (2.)Mr. Bob will transfer from bed to chair and chair to bed with CONTACT GUARD ASSIST using the least restrictive device within 5 treatment day(s).  MET  (3.)Mr. Bob will ambulate with MINIMAL ASSIST for 40 feet with the least restrictive device within 5 treatment day(s). MET     LTG:  (1.)Mr. Bob will move from supine to sit and sit to supine  in bed with SUPERVISION within 10 treatment day(s).    (2.)Mr. Bob will transfer from bed to chair and chair to bed with SUPERVISION using the least restrictive device within 10 treatment day(s).    (3.)Mr. Bob will ambulate with CONTACT GUARD ASSIST for 80 feet with the least restrictive device within 10 treatment day(s). ALMOST MET     PHYSICAL THERAPY: Daily Note and PM Treatment Day # 4    Kelsey Chaudhry is a 72 y.o. male   PRIMARY DIAGNOSIS: COVID-19  COVID-19 [U07.1]         ASSESSMENT:     REHAB RECOMMENDATIONS: CURRENT LEVEL OF FUNCTION:  (Most Recently Demonstrated)   Recommendation to date pending progress:  [address] [place] [address]  Equipment:    None Bed Mobility:   Supervision  Sit to Stand:   Contact Guard Assistance  Transfers:   Contact Guard Assistance  Gait/Mobility:   Contact Guard Assistance     ASSESSMENT:  Mr. Sarah Ross presents in supine on 8L. He got to the EOB and was min/mod A without walker to walk into the bathroom. He was on RA for about 10 minutes as O2 wouldn't reach. His sats never got below 90%. He stood and cleaned himself which took some time and effort as well as a seated rest.  He walked around the room 1 full lap on 2L with sats in the  Mid 90's. Good improvement in O2 requirements. SUBJECTIVE:   Mr. Sarah Ross states, \"I need to use the bathroom. \"    SOCIAL HISTORY/ LIVING ENVIRONMENT:   Home Environment: Private residence  # Steps to Enter: 0  One/Two Story Residence: One story  Living Alone: No  Support Systems: Spouse/Significant Other/Partner  OBJECTIVE:     PAIN: VITAL SIGNS: LINES/DRAINS:   Pre Treatment: Pain Screen  Pain Scale 1: FLACC  Pain Intensity 1: 0  Post Treatment: 0   none  O2 Device: Hi flow nasal cannula     MOBILITY: I Mod I S SBA CGA Min Mod Max Total  NT x2 Comments:   Bed Mobility    Rolling [] [] [x] [] [] [] [] [] [] [] []    Supine to Sit [] [] [x] [] [] [] [] [] [] [] []    Scooting [] [] [x] [] [] [] [] [] [] [] []    Sit to Supine [] [] [] [] [] [] [] [] [] [x] []    Transfers    Sit to Stand [] [] [] [] [x] [] [] [] [] [] [] Only if  He uses the walker otherwise min A   Bed to Chair [] [] [] [] [x] [] [] [] [] [] []    Stand to Sit [] [] [] [] [x] [] [] [] [] [] []    I=Independent, Mod I=Modified Independent, S=Supervision, SBA=Standby Assistance, CGA=Contact Guard Assistance,   Min=Minimal Assistance, Mod=Moderate Assistance, Max=Maximal Assistance, Total=Total Assistance, NT=Not Tested    GAIT: I Mod I S SBA CGA Min Mod Max Total  NT x2 Comments:   Level of Assistance [] [] [] [x] [x] [] [] [] [] [] [] Furniture walks but is very unstable when he does this - much better with walker   Distance 30    DME Rolling Walker    Gait Quality Slow and shuffled    Weightbearing  Status N/A     I=Independent, Mod I=Modified Independent, S=Supervision, SBA=Standby Assistance, CGA=Contact Guard Assistance,   Min=Minimal Assistance, Mod=Moderate Assistance, Max=Maximal Assistance, Total=Total Assistance, NT=Not Tested    PLAN:   FREQUENCY/DURATION: PT Plan of Care: 3 times/week for duration of hospital stay or until stated goals are met, whichever comes first.  TREATMENT:     TREATMENT:   ($$ Therapeutic Activity: 23-37 mins    )  Therapeutic Activity (35 Minutes):  Therapeutic activity included Transfer Training, Ambulation on level ground and Standing balance to improve functional Mobility, Strength and Activity tolerance.     AFTER TREATMENT POSITION/PRECAUTIONS:  Chair, Needs within reach and RN notified    INTERDISCIPLINARY COLLABORATION:  RN/PCT and PT/PTA    TOTAL TREATMENT DURATION:  PT Patient Time In/Time Out  Time In: 1315  Time Out: [address], PTA

## 2021-01-31 NOTE — PROGRESS NOTES
Progress Note    Patient: Jim Childress MRN: [id number]  SSN: [id number]    YOB: 1950  Age: 72 y.o. Sex: male      Admit Date: 1/21/2021    LOS: 10 days     Subjective:   F/U COVID    71 yo hx of DM type II, cor pulmonale, eczema, sCHF EF 55%, paroxysmal a fib on Eliquis, HTN admitted for COVID. Also given doxycycline and ceftriaxone for possible bacterial pneumonia for 7 days. Remdesivir 1/25-1/29. Did not get convalescent plasma. FORTINO seen at 1.71 but now improved to 1.3.     8L NC. Glucose improved. No chest pain or SOB.    Current Facility-Administered Medications   Medication Dose Route Frequency    insulin lispro (HUMALOG) injection 4 Units  4 Units SubCUTAneous TID WITH MEALS    nystatin (MYCOSTATIN) 100,000 unit/mL oral suspension 500,000 Units  500,000 Units Oral QID    albuterol (PROVENTIL HFA, VENTOLIN HFA, PROAIR HFA) inhaler 2 Puff  2 Puff Inhalation Q6H PRN    albuterol (PROVENTIL HFA, VENTOLIN HFA, PROAIR HFA) inhaler 2 Puff  2 Puff Inhalation BID RT    insulin glargine (LANTUS) injection 35 Units  35 Units SubCUTAneous Q12H    cholecalciferol (VITAMIN D3) (1000 Units /25 mcg) tablet 1,000 Units  1,000 Units Oral DAILY    phenol throat spray (CHLORASEPTIC) 1 Spray  1 Spray Oral PRN    insulin lispro (HUMALOG) injection   SubCUTAneous AC&HS    torsemide (DEMADEX) tablet 20 mg  20 mg Oral DAILY    zinc sulfate (ZINCATE) 220 (50) mg capsule 1 Cap  1 Cap Oral DAILY    ferrous sulfate tablet 325 mg  1 Tab Oral BID WITH MEALS    pantoprazole (PROTONIX) tablet 40 mg  40 mg Oral DAILY    apixaban (ELIQUIS) tablet 5 mg  5 mg Oral BID    metoprolol succinate (TOPROL-XL) XL tablet 100 mg  100 mg Oral DAILY    nystatin (MYCOSTATIN) 100,000 unit/gram powder   Topical QID    pravastatin (PRAVACHOL) tablet 40 mg  40 mg Oral QHS    amiodarone (CORDARONE) tablet 200 mg  200 mg Oral DAILY    ondansetron (ZOFRAN ODT) tablet 4 mg  4 mg Oral Q8H PRN    loratadine (CLARITIN) tablet 10 mg  10 mg Oral DAILY PRN    sodium chloride (NS) flush 5-40 mL  5-40 mL IntraVENous Q8H    sodium chloride (NS) flush 5-40 mL  5-40 mL IntraVENous PRN    acetaminophen (TYLENOL) tablet 650 mg  650 mg Oral Q6H PRN    Or    acetaminophen (TYLENOL) suppository 650 mg  650 mg Rectal Q6H PRN    polyethylene glycol (MIRALAX) packet 17 g  17 g Oral DAILY    senna (SENOKOT) tablet 8.6 mg  1 Tab Oral DAILY PRN    bisacodyL (DULCOLAX) suppository 10 mg  10 mg Rectal DAILY PRN    guaiFENesin-dextromethorphan (ROBITUSSIN DM) 100-10 mg/5 mL syrup 5 mL  5 mL Oral Q4H PRN    0.9% sodium chloride infusion 250 mL  250 mL IntraVENous PRN    ascorbic acid (vitamin C) (VITAMIN C) tablet 1,000 mg  1,000 mg Oral DAILY       Objective:     Vitals:    01/30/21 2335 01/31/21 0431 01/31/21 0536 01/31/21 0734   BP: 106/64 107/81  116/70   Pulse: 79 82  80   Resp: 18 19  19   Temp: 98.2 °F (36.8 °C) 97.6 °F (36.4 °C)  97.8 °F (36.6 °C)   SpO2: 94% 91%  90%   Weight:   92.5 kg (203 lb 14.8 oz)    Height:             Intake and Output:  Current Shift: 01/31 0701 - 01/31 1900  In: 360 [P.O.:360]  Out: -   Last three shifts: 01/29 1901 - 01/31 0700  In: 5384 [P.O.:1755]  Out: -     Physical Exam:   General:  Alert, cooperative, no distress. Very pleasant and making jokes. Eyes:  Conjunctivae/corneas clear. Ears:  Normal TMs and external ear canals both ears. Nose: Nares normal. Septum midline. Mouth/Throat: Yeast on tongue   Neck: no JVD. Back:   deferred   Lungs:   Clear to auscultation bilaterally but limited breathe sounds. Heart:  Regular rate and rhythm, S1, S2 normal   Abdomen:   Soft, non-tender. Bowel sounds normal.    Extremities: Extremities normal, atraumatic, no cyanosis or edema. Pulses: 2+ and symmetric all extremities. Skin: Dry skin to legs   Lymph nodes: Cervical, supraclavicular, and axillary nodes normal.   Neurologic: CNII-XII intact. Limited ROM in bed.         Lab/Data Review:    Recent Results (from the past 24 hour(s))   GLUCOSE, POC    Collection Time: 01/30/21 10:59 AM   Result Value Ref Range    Glucose (POC) 232 (H) 65 - 100 mg/dL   GLUCOSE, POC    Collection Time: 01/30/21  3:55 PM   Result Value Ref Range    Glucose (POC) 160 (H) 65 - 100 mg/dL   GLUCOSE, POC    Collection Time: 01/30/21  9:17 PM   Result Value Ref Range    Glucose (POC) 215 (H) 65 - 100 mg/dL   GLUCOSE, POC    Collection Time: 01/31/21  5:36 AM   Result Value Ref Range    Glucose (POC) 156 (H) 65 - 100 mg/dL       Assessment/ Plan:     Principal Problem:    COVID-19 (1/21/2021)    Active Problems:    Severe sepsis ([facility] (1/23/2021)    COVID with possible bacterial pneumonia - Ceftriaxone for 7 days and doxycycline for total of 6 days. Vitamin C, vitamin D, decadron. S/p Remdesivir. Albuterol q 12 hr.     DM type II - lantus 35 units BID, 4 units before meals, SS. ADA    HTN- BB    sCHF - compensated. BB. Demadex    A fib - Eliquis, amiodarone. DVT prophylaxis - Eliquis    Spoke with RT and will see if we can wean to lower oxygen settings    Looking better. Hopeful dc by Monday if can wean down oxygen supplement. Will dc with HH.    Signed By: Shanell Arboleda,      January 31, 2021

## 2021-01-31 NOTE — PROGRESS NOTES
BRENNON cruz from [facility]. Patient in stable condition at this time. Safety measures in place. No S/Sx of distress. Respirations even and unlabored.  Call light within reach and patient encouraged to call nurse prn assist.

## 2021-02-01 ENCOUNTER — APPOINTMENT (OUTPATIENT)
Dept: GENERAL RADIOLOGY | Age: 71
DRG: 314 | End: 2021-02-01
Attending: EMERGENCY MEDICINE
Payer: MEDICARE

## 2021-02-01 ENCOUNTER — HOME HEALTH ADMISSION (OUTPATIENT)
Dept: HOME HEALTH SERVICES | Facility: HOME HEALTH | Age: 71
End: 2021-02-01

## 2021-02-01 ENCOUNTER — HOSPITAL ENCOUNTER (INPATIENT)
Age: 71
LOS: 1 days | Discharge: NURSING, REHAB OR LONG-TERM CARE | DRG: 314 | End: 2021-02-03
Attending: EMERGENCY MEDICINE | Admitting: INTERNAL MEDICINE
Payer: MEDICARE

## 2021-02-01 VITALS
SYSTOLIC BLOOD PRESSURE: 108 MMHG | HEIGHT: 69 IN | TEMPERATURE: 97.4 F | WEIGHT: 203.26 LBS | OXYGEN SATURATION: 92 % | DIASTOLIC BLOOD PRESSURE: 79 MMHG | RESPIRATION RATE: 20 BRPM | HEART RATE: 81 BPM | BODY MASS INDEX: 30.11 KG/M2

## 2021-02-01 DIAGNOSIS — N17.9 ACUTE KIDNEY INJURY (HCC): ICD-10-CM

## 2021-02-01 DIAGNOSIS — R55 NEAR SYNCOPE: ICD-10-CM

## 2021-02-01 DIAGNOSIS — I48.0 PAROXYSMAL ATRIAL FIBRILLATION (HCC): ICD-10-CM

## 2021-02-01 DIAGNOSIS — N28.9 ACUTE RENAL INSUFFICIENCY: ICD-10-CM

## 2021-02-01 DIAGNOSIS — I27.81 COR PULMONALE (CHRONIC) (HCC): ICD-10-CM

## 2021-02-01 DIAGNOSIS — U07.1 COVID-19 VIRUS INFECTION: Primary | ICD-10-CM

## 2021-02-01 LAB
ALBUMIN SERPL-MCNC: 2.2 G/DL (ref 3.2–4.6)
ALBUMIN SERPL-MCNC: 2.6 G/DL (ref 3.2–4.6)
ALBUMIN/GLOB SERPL: 0.6 {RATIO} (ref 1.2–3.5)
ALBUMIN/GLOB SERPL: 0.7 {RATIO} (ref 1.2–3.5)
ALP SERPL-CCNC: 104 U/L (ref 50–136)
ALP SERPL-CCNC: 126 U/L (ref 50–136)
ALT SERPL-CCNC: 134 U/L (ref 12–65)
ALT SERPL-CCNC: 148 U/L (ref 12–65)
ANION GAP SERPL CALC-SCNC: 11 MMOL/L (ref 7–16)
ANION GAP SERPL CALC-SCNC: 13 MMOL/L (ref 7–16)
AST SERPL-CCNC: 73 U/L (ref 15–37)
AST SERPL-CCNC: 78 U/L (ref 15–37)
BASOPHILS # BLD: 0.1 K/UL (ref 0–0.2)
BASOPHILS # BLD: 0.1 K/UL (ref 0–0.2)
BASOPHILS NFR BLD: 0 % (ref 0–2)
BASOPHILS NFR BLD: 0 % (ref 0–2)
BILIRUB SERPL-MCNC: 0.6 MG/DL (ref 0.2–1.1)
BILIRUB SERPL-MCNC: 0.8 MG/DL (ref 0.2–1.1)
BUN SERPL-MCNC: 64 MG/DL (ref 8–23)
BUN SERPL-MCNC: 71 MG/DL (ref 8–23)
CALCIUM SERPL-MCNC: 8.4 MG/DL (ref 8.3–10.4)
CALCIUM SERPL-MCNC: 8.5 MG/DL (ref 8.3–10.4)
CHLORIDE SERPL-SCNC: 103 MMOL/L (ref 98–107)
CHLORIDE SERPL-SCNC: 99 MMOL/L (ref 98–107)
CO2 SERPL-SCNC: 23 MMOL/L (ref 21–32)
CO2 SERPL-SCNC: 24 MMOL/L (ref 21–32)
CREAT SERPL-MCNC: 1.4 MG/DL (ref 0.8–1.5)
CREAT SERPL-MCNC: 1.88 MG/DL (ref 0.8–1.5)
DIFFERENTIAL METHOD BLD: ABNORMAL
DIFFERENTIAL METHOD BLD: ABNORMAL
EOSINOPHIL # BLD: 0 K/UL (ref 0–0.8)
EOSINOPHIL # BLD: 0 K/UL (ref 0–0.8)
EOSINOPHIL NFR BLD: 0 % (ref 0.5–7.8)
EOSINOPHIL NFR BLD: 0 % (ref 0.5–7.8)
ERYTHROCYTE [DISTWIDTH] IN BLOOD BY AUTOMATED COUNT: 18.6 % (ref 11.9–14.6)
ERYTHROCYTE [DISTWIDTH] IN BLOOD BY AUTOMATED COUNT: 18.6 % (ref 11.9–14.6)
GLOBULIN SER CALC-MCNC: 3.8 G/DL (ref 2.3–3.5)
GLOBULIN SER CALC-MCNC: 3.8 G/DL (ref 2.3–3.5)
GLUCOSE BLD STRIP.AUTO-MCNC: 114 MG/DL (ref 65–100)
GLUCOSE BLD STRIP.AUTO-MCNC: 188 MG/DL (ref 65–100)
GLUCOSE SERPL-MCNC: 157 MG/DL (ref 65–100)
GLUCOSE SERPL-MCNC: 273 MG/DL (ref 65–100)
HCT VFR BLD AUTO: 54.8 % (ref 41.1–50.3)
HCT VFR BLD AUTO: 56.4 % (ref 41.1–50.3)
HGB BLD-MCNC: 17.6 G/DL (ref 13.6–17.2)
HGB BLD-MCNC: 18.4 G/DL (ref 13.6–17.2)
IMM GRANULOCYTES # BLD AUTO: 0.5 K/UL (ref 0–0.5)
IMM GRANULOCYTES # BLD AUTO: 0.9 K/UL (ref 0–0.5)
IMM GRANULOCYTES NFR BLD AUTO: 3 % (ref 0–5)
IMM GRANULOCYTES NFR BLD AUTO: 4 % (ref 0–5)
LACTATE SERPL-SCNC: 2.9 MMOL/L (ref 0.4–2)
LYMPHOCYTES # BLD: 1.1 K/UL (ref 0.5–4.6)
LYMPHOCYTES # BLD: 1.4 K/UL (ref 0.5–4.6)
LYMPHOCYTES NFR BLD: 6 % (ref 13–44)
LYMPHOCYTES NFR BLD: 7 % (ref 13–44)
MCH RBC QN AUTO: 26.6 PG (ref 26.1–32.9)
MCH RBC QN AUTO: 26.7 PG (ref 26.1–32.9)
MCHC RBC AUTO-ENTMCNC: 32.1 G/DL (ref 31.4–35)
MCHC RBC AUTO-ENTMCNC: 32.6 G/DL (ref 31.4–35)
MCV RBC AUTO: 81.4 FL (ref 79.6–97.8)
MCV RBC AUTO: 83.2 FL (ref 79.6–97.8)
MONOCYTES # BLD: 1.1 K/UL (ref 0.1–1.3)
MONOCYTES # BLD: 1.5 K/UL (ref 0.1–1.3)
MONOCYTES NFR BLD: 6 % (ref 4–12)
MONOCYTES NFR BLD: 6 % (ref 4–12)
NEUTS SEG # BLD: 13.8 K/UL (ref 1.7–8.2)
NEUTS SEG # BLD: 20.2 K/UL (ref 1.7–8.2)
NEUTS SEG NFR BLD: 83 % (ref 43–78)
NEUTS SEG NFR BLD: 84 % (ref 43–78)
NRBC # BLD: 0 K/UL (ref 0–0.2)
NRBC # BLD: 0 K/UL (ref 0–0.2)
PLATELET # BLD AUTO: 236 K/UL (ref 150–450)
PLATELET # BLD AUTO: 338 K/UL (ref 150–450)
PMV BLD AUTO: 10.1 FL (ref 9.4–12.3)
PMV BLD AUTO: 10.5 FL (ref 9.4–12.3)
POTASSIUM SERPL-SCNC: 4.2 MMOL/L (ref 3.5–5.1)
POTASSIUM SERPL-SCNC: 4.7 MMOL/L (ref 3.5–5.1)
PROCALCITONIN SERPL-MCNC: 0.1 NG/ML
PROT SERPL-MCNC: 6 G/DL (ref 6.3–8.2)
PROT SERPL-MCNC: 6.4 G/DL (ref 6.3–8.2)
RBC # BLD AUTO: 6.59 M/UL (ref 4.23–5.6)
RBC # BLD AUTO: 6.93 M/UL (ref 4.23–5.6)
SODIUM SERPL-SCNC: 136 MMOL/L (ref 136–145)
SODIUM SERPL-SCNC: 137 MMOL/L (ref 138–145)
WBC # BLD AUTO: 16.5 K/UL (ref 4.3–11.1)
WBC # BLD AUTO: 24.1 K/UL (ref 4.3–11.1)

## 2021-02-01 PROCEDURE — 74011636637 HC RX REV CODE- 636/637: Performed by: HOSPITALIST

## 2021-02-01 PROCEDURE — 82962 GLUCOSE BLOOD TEST: CPT

## 2021-02-01 PROCEDURE — 84145 PROCALCITONIN (PCT): CPT

## 2021-02-01 PROCEDURE — 71045 X-RAY EXAM CHEST 1 VIEW: CPT

## 2021-02-01 PROCEDURE — 81003 URINALYSIS AUTO W/O SCOPE: CPT

## 2021-02-01 PROCEDURE — 94761 N-INVAS EAR/PLS OXIMETRY MLT: CPT

## 2021-02-01 PROCEDURE — 73502 X-RAY EXAM HIP UNI 2-3 VIEWS: CPT

## 2021-02-01 PROCEDURE — 97530 THERAPEUTIC ACTIVITIES: CPT

## 2021-02-01 PROCEDURE — 84443 ASSAY THYROID STIM HORMONE: CPT

## 2021-02-01 PROCEDURE — 74011250637 HC RX REV CODE- 250/637: Performed by: FAMILY MEDICINE

## 2021-02-01 PROCEDURE — 74011250637 HC RX REV CODE- 250/637: Performed by: HOSPITALIST

## 2021-02-01 PROCEDURE — 87040 BLOOD CULTURE FOR BACTERIA: CPT

## 2021-02-01 PROCEDURE — 83605 ASSAY OF LACTIC ACID: CPT

## 2021-02-01 PROCEDURE — 99285 EMERGENCY DEPT VISIT HI MDM: CPT

## 2021-02-01 PROCEDURE — 36415 COLL VENOUS BLD VENIPUNCTURE: CPT

## 2021-02-01 PROCEDURE — 74011250637 HC RX REV CODE- 250/637: Performed by: INTERNAL MEDICINE

## 2021-02-01 PROCEDURE — 85025 COMPLETE CBC W/AUTO DIFF WBC: CPT

## 2021-02-01 PROCEDURE — 77010033678 HC OXYGEN DAILY

## 2021-02-01 PROCEDURE — 74011250636 HC RX REV CODE- 250/636: Performed by: EMERGENCY MEDICINE

## 2021-02-01 PROCEDURE — 94640 AIRWAY INHALATION TREATMENT: CPT

## 2021-02-01 PROCEDURE — 94760 N-INVAS EAR/PLS OXIMETRY 1: CPT

## 2021-02-01 PROCEDURE — 93005 ELECTROCARDIOGRAM TRACING: CPT | Performed by: EMERGENCY MEDICINE

## 2021-02-01 PROCEDURE — 80053 COMPREHEN METABOLIC PANEL: CPT

## 2021-02-01 PROCEDURE — 74011636637 HC RX REV CODE- 636/637: Performed by: FAMILY MEDICINE

## 2021-02-01 RX ORDER — ALBUTEROL SULFATE 90 UG/1
2 AEROSOL, METERED RESPIRATORY (INHALATION)
Qty: 1 INHALER | Refills: 0 | Status: SHIPPED | OUTPATIENT
Start: 2021-02-01

## 2021-02-01 RX ADMIN — INSULIN GLARGINE 35 UNITS: 100 INJECTION, SOLUTION SUBCUTANEOUS at 08:48

## 2021-02-01 RX ADMIN — VITAMIN D, TAB 1000IU (100/BT) 1000 UNITS: 25 TAB at 08:46

## 2021-02-01 RX ADMIN — METOPROLOL SUCCINATE 100 MG: 50 TABLET, EXTENDED RELEASE ORAL at 08:47

## 2021-02-01 RX ADMIN — INSULIN LISPRO 2 UNITS: 100 INJECTION, SOLUTION INTRAVENOUS; SUBCUTANEOUS at 11:29

## 2021-02-01 RX ADMIN — SODIUM CHLORIDE 1000 ML: 900 INJECTION, SOLUTION INTRAVENOUS at 23:46

## 2021-02-01 RX ADMIN — INSULIN LISPRO 4 UNITS: 100 INJECTION, SOLUTION INTRAVENOUS; SUBCUTANEOUS at 11:30

## 2021-02-01 RX ADMIN — ZINC SULFATE 220 MG (50 MG) CAPSULE 1 CAPSULE: CAPSULE at 08:46

## 2021-02-01 RX ADMIN — INSULIN LISPRO 4 UNITS: 100 INJECTION, SOLUTION INTRAVENOUS; SUBCUTANEOUS at 08:47

## 2021-02-01 RX ADMIN — PANTOPRAZOLE SODIUM 40 MG: 40 TABLET, DELAYED RELEASE ORAL at 08:46

## 2021-02-01 RX ADMIN — APIXABAN 5 MG: 5 TABLET, FILM COATED ORAL at 08:47

## 2021-02-01 RX ADMIN — OXYCODONE HYDROCHLORIDE AND ACETAMINOPHEN 1000 MG: 500 TABLET ORAL at 08:46

## 2021-02-01 RX ADMIN — FERROUS SULFATE TAB 325 MG (65 MG ELEMENTAL FE) 325 MG: 325 (65 FE) TAB at 08:47

## 2021-02-01 RX ADMIN — TORSEMIDE 20 MG: 20 TABLET ORAL at 08:46

## 2021-02-01 RX ADMIN — AMIODARONE HYDROCHLORIDE 200 MG: 200 TABLET ORAL at 08:47

## 2021-02-01 RX ADMIN — SODIUM CHLORIDE 1000 ML: 900 INJECTION, SOLUTION INTRAVENOUS at 22:03

## 2021-02-01 RX ADMIN — NYSTATIN: 100000 POWDER TOPICAL at 08:49

## 2021-02-01 RX ADMIN — ALBUTEROL SULFATE 2 PUFF: 108 AEROSOL, METERED RESPIRATORY (INHALATION) at 09:40

## 2021-02-01 RX ADMIN — Medication 10 ML: at 06:01

## 2021-02-01 NOTE — PROGRESS NOTES
Discharge orders received from MD. Instructions reviewed with patient. Verbalized understanding. IV removed from L forearm. Catheter tip intact. Gauze and tape applied. Belongings gathered by assistant. Transportation being arranged by Nela Rodríguez. Pt now resting quietly in bed awaiting . Bed low and locked with call bell within reach.

## 2021-02-01 NOTE — PROGRESS NOTES
ACUTE PHYSICAL THERAPY GOALS:  (Developed with and agreed upon by patient and/or caregiver.)  STG:  (1.)Mr. Bob will move from supine to sit and sit to supine  with MINIMAL ASSIST within 5 treatment day(s).  MET  (2.)Mr. Bob will transfer from bed to chair and chair to bed with CONTACT GUARD ASSIST using the least restrictive device within 5 treatment day(s).  MET  (3.)Mr. Bob will ambulate with MINIMAL ASSIST for 40 feet with the least restrictive device within 5 treatment day(s). MET     LTG:  (1.)Mr. Bob will move from supine to sit and sit to supine  in bed with SUPERVISION within 10 treatment day(s).    (2.)Mr. Bob will transfer from bed to chair and chair to bed with SUPERVISION using the least restrictive device within 10 treatment day(s).    (3.)Mr. Bob will ambulate with CONTACT GUARD ASSIST for 80 feet with the least restrictive device within 10 treatment day(s). ALMOST MET     PHYSICAL THERAPY: Daily Note and AM Treatment Day # 5    Hyacinth Garner is a 72 y.o. male   PRIMARY DIAGNOSIS: COVID-19  COVID-19 [U07.1]         ASSESSMENT:     REHAB RECOMMENDATIONS: CURRENT LEVEL OF FUNCTION:  (Most Recently Demonstrated)   Recommendation to date pending progress:  [address] [place] [address]  Equipment:    None Bed Mobility:   Supervision  Sit to Stand:   Contact Guard Assistance  Transfers:   Contact Guard Assistance  Gait/Mobility:   Standby Assistance     ASSESSMENT:  Mr. Asia Latham presents in supine on 8L. He got to the EOB and was min/mod A without walker to walk into the bathroom. He was on RA for about 10 minutes as O2 wouldn't reach. His sats never got below 90%. He stood and cleaned himself which took some time and effort as well as a seated rest.  He walked around the room 1 full lap on 2L with sats in the  Mid 90's. Good improvement in O2 requirements. SUBJECTIVE:   Mr. Asia Latham states, \"I need to use the bathroom. \"    SOCIAL HISTORY/ LIVING ENVIRONMENT:   Home Environment: Private residence  # Steps to Enter: 0  One/Two Story Residence: One story  Living Alone: No  Support Systems: Spouse/Significant Other/Partner  OBJECTIVE:     PAIN: VITAL SIGNS: LINES/DRAINS:   Pre Treatment: Pain Screen  Pain Scale 1: FLACC  Pain Intensity 1: 0  Post Treatment: 0   none  O2 Device: Nasal cannula     MOBILITY: I Mod I S SBA CGA Min Mod Max Total  NT x2 Comments:   Bed Mobility    Rolling [] [] [x] [] [] [] [] [] [] [] []    Supine to Sit [] [] [x] [] [] [] [] [] [] [] []    Scooting [] [] [x] [] [] [] [] [] [] [] []    Sit to Supine [] [] [] [] [] [] [] [] [] [x] []    Transfers    Sit to Stand [] [] [] [] [x] [] [] [] [] [] [] Only if  He uses the walker otherwise min A   Bed to Chair [] [] [] [] [x] [] [] [] [] [] []    Stand to Sit [] [] [] [] [x] [] [] [] [] [] []    I=Independent, Mod I=Modified Independent, S=Supervision, SBA=Standby Assistance, CGA=Contact Guard Assistance,   Min=Minimal Assistance, Mod=Moderate Assistance, Max=Maximal Assistance, Total=Total Assistance, NT=Not Tested    GAIT: I Mod I S SBA CGA Min Mod Max Total  NT x2 Comments:   Level of Assistance [] [] [] [x] [x] [] [] [] [] [] [] Furniture walks but is very unstable when he does this - much better with walker   Distance 36    DME Rolling Walker    Gait Quality Slow and shuffled    Weightbearing  Status N/A     I=Independent, Mod I=Modified Independent, S=Supervision, SBA=Standby Assistance, CGA=Contact Guard Assistance,   Min=Minimal Assistance, Mod=Moderate Assistance, Max=Maximal Assistance, Total=Total Assistance, NT=Not Tested    PLAN:   FREQUENCY/DURATION: PT Plan of Care: 3 times/week for duration of hospital stay or until stated goals are met, whichever comes first.  TREATMENT:     TREATMENT:   ($$ Therapeutic Activity: 23-37 mins    )  Therapeutic Activity (35 Minutes):  Therapeutic activity included Transfer Training, Ambulation on level ground and Standing balance to improve functional Mobility, Strength and Activity tolerance.     AFTER TREATMENT POSITION/PRECAUTIONS:  Bed, Needs within reach and RN notified    INTERDISCIPLINARY COLLABORATION:  RN/PCT and PT/PTA    TOTAL TREATMENT DURATION:  PT Patient Time In/Time Out  Time In: 1105  Time Out: [address], [facility] [place]

## 2021-02-01 NOTE — DISCHARGE INSTRUCTIONS
Patient Education   Patient Education        Coronavirus ([facility]-20): Care Instructions  Overview  The coronavirus disease (COVID-19) is caused by a virus. Symptoms may include a fever, a cough, and shortness of breath. It mainly spreads person-to-person through droplets from coughing and sneezing. The virus also can spread when people are in close contact with someone who is infected. Most people have mild symptoms and can take care of themselves at home. If their symptoms get worse, they may need care in a hospital. Treatment may include medicines to reduce symptoms, plus breathing support such as oxygen therapy or a ventilator. It's important to not spread the virus to others. If you have COVID-19, wear a face cover anytime you are around other people. You need to isolate yourself while you are sick. Leave your home only if you need to get medical care or testing. Follow-up care is a key part of your treatment and safety. Be sure to make and go to all appointments, and call your doctor if you are having problems. It's also a good idea to know your test results and keep a list of the medicines you take. How can you care for yourself at home? · Get extra rest. It can help you feel better. · Drink plenty of fluids. This helps replace fluids lost from fever. Fluids also help ease a scratchy throat. Water, soup, fruit juice, and hot tea with lemon are good choices. · Take acetaminophen (such as Tylenol) to reduce a fever. It may also help with muscle aches. Read and follow all instructions on the label. · Use petroleum jelly on sore skin. This can help if the skin around your nose and lips becomes sore from rubbing a lot with tissues. Tips for self-isolation  · Limit contact with people in your home. If possible, stay in a separate bedroom and use a separate bathroom. · Wear a cloth face cover when you are around other people. It can help stop the spread of the virus when you cough or sneeze.   · If you have to leave home, avoid crowds and try to stay at least 6 feet away from other people.  · Avoid contact with pets and other animals.  · Cover your mouth and nose with a tissue when you cough or sneeze. Then throw it in the trash right away.  · Wash your hands often, especially after you cough or sneeze. Use soap and water, and scrub for at least 20 seconds. If soap and water aren't available, use an alcohol-based hand .  · Don't share personal household items. These include bedding, towels, cups and glasses, and eating utensils.  · Wash laundry in the warmest water allowed for the fabric type, and dry it completely. It's okay to wash other people's laundry with yours.  · Clean and disinfect your home every day. Use household  and disinfectant wipes or sprays. Take special care to clean things that you grab with your hands. These include doorknobs, remote controls, phones, and handles on your refrigerator and microwave. And don't forget countertops, tabletops, bathrooms, and computer keyboards.  When you can end self-isolation  · If you know or suspect that you have COVID-19, stay in self-isolation until:  ? You haven't had a fever for 24 hours while not taking medicines to lower the fever, and  ? Your symptoms have improved, and  ? It's been at least 10 days since your symptoms started.  · Talk to your doctor about whether you also need testing, especially if you have a weakened immune system.  When should you call for help?   Call 911 anytime you think you may need emergency care. For example, call if you have life-threatening symptoms, such as:    · You have severe trouble breathing. (You can't talk at all.)     · You have constant chest pain or pressure.     · You are severely dizzy or lightheaded.     · You are confused or can't think clearly.     · Your face and lips have a blue color.     · You pass out (lose consciousness) or are very hard to wake up.   Call your doctor now or seek immediate  medical care if:    · You have moderate trouble breathing. (You can't speak a full sentence.)     · You are coughing up blood (more than about 1 teaspoon).     · You have signs of low blood pressure. These include feeling lightheaded; being too weak to stand; and having cold, pale, clammy skin. Watch closely for changes in your health, and be sure to contact your doctor if:    · Your symptoms get worse.     · You are not getting better as expected. Call before you go to the doctor's office. Follow their instructions. And wear a cloth face cover. Current as of: December 18, 2020               Content Version: 12.7  © 0449-8935 [employer]. Care instructions adapted under license by [employer] (which disclaims liability or warranty for this information). If you have questions about a medical condition or this instruction, always ask your healthcare professional. [place] [address] any warranty or liability for your use of this information. Leaving the Hospital After Treatment for COVID-19: Care Instructions  Overview     You are being sent home from the hospital after being treated for COVID-19. Being in the hospital can be hard, especially if you've been in the intensive care unit (ICU). Even though you're going home, you probably don't feel well yet. Healing from COVID-19 takes time. You may feel very tired for weeks or months afterward, especially if you were on a ventilator. It will take time to get back to your old level of activity. Some people may have long-lasting health problems. But most people can look forward to feeling a little better every day. If you were on a ventilator, your throat may be sore and your voice hoarse or raspy for a while. After leaving the hospital, some people have feelings of anxiety and depression. They may have nightmares. Or in their mind they may relive events that happened in the hospital (flashbacks).  You can always reach out to your doctor if you're having trouble with these symptoms. Your doctor will tell you if you need to isolate yourself at home, and when you can end isolation. Follow-up care is a key part of your treatment and safety. Be sure to make and go to all appointments, and call your doctor if you are having problems. It's also a good idea to know your test results and keep a list of the medicines you take. How can you care for yourself at home? · Get plenty of rest. It can help you feel better. · Be kind to yourself if it's taking longer than you expected to feel better. You've been through a stressful time. · Get up and walk around every hour or two while you're resting. Slowly increase your activity as you start to feel better. · Eat healthy foods. · Drink plenty of fluids. If you have kidney, heart, or liver disease and have to limit fluids, talk with your doctor before you increase the amount of fluids you drink. · Take acetaminophen (such as Tylenol) to reduce a fever. It may also help with muscle aches. Read and follow all instructions on the label. If you are in isolation after you get home  · Wear a cloth face cover when you are around other people. It can help stop the spread of the virus when you cough or sneeze. · Limit contact with people in your home. If possible, stay in a separate bedroom and use a separate bathroom. · If you have to leave home, avoid crowds and try to stay at least 6 feet away from other people. · Avoid contact with pets and other animals. · Cover your mouth and nose with a tissue when you cough or sneeze. Then throw it in the trash right away. · Wash your hands often, especially after you cough or sneeze. Use soap and water, and scrub for at least 20 seconds. If soap and water aren't available, use an alcohol-based hand . · Don't share personal household items. These include bedding, towels, cups and glasses, and eating utensils.   · Wash laundry in the warmest water allowed for the fabric type, and dry it completely. It's okay to wash other people's laundry with yours. · Clean and disinfect your home every day. Use household  and disinfectant wipes or sprays. Take special care to clean things that you grab with your hands. These include doorknobs, remote controls, phones, and handles on your refrigerator and microwave. And don't forget countertops, tabletops, bathrooms, and computer keyboards. When should you call for help? Call 911 anytime you think you may need emergency care. For example, call if you have life-threatening symptoms, such as:    · You have severe trouble breathing. (You can't talk at all.)     · You have constant chest pain or pressure.     · You are severely dizzy or lightheaded.     · You are confused or can't think clearly.     · Your face and lips have a blue color.     · You passed out (lost consciousness) or are very hard to wake up. Call your doctor now or seek immediate medical care if:    · You have moderate trouble breathing. (You can't speak a full sentence.)     · You are coughing up blood (more than about 1 teaspoon).     · You have signs of low blood pressure. These include feeling lightheaded; being too weak to stand; and having cold, pale, clammy skin. Watch closely for changes in your health, and be sure to contact your doctor if:    · Your symptoms get worse.     · You are not getting better as expected.     · You have new or worse symptoms of anxiety, depression, nightmares, or flashbacks. Call before you go to the doctor's office. Follow their instructions. And wear a cloth face cover. Current as of: December 18, 2020               Content Version: 12.7  © 4143-0789 [employer]. Care instructions adapted under license by [employer] (which disclaims liability or warranty for this information).  If you have questions about a medical condition or this instruction, always ask your healthcare professional. [address] any warranty or liability for your use of this information.

## 2021-02-01 NOTE — PROGRESS NOTES
Oxygen Qualifier       Room air: SpO2 with O2 and liter flow   Resting SpO2  88%  90% on 4.5L   Ambulating SpO2   92% on 4L       Completed by:    Marita Helms, RT

## 2021-02-01 NOTE — DISCHARGE SUMMARY
Hospitalist Discharge Summary     Patient ID:  Marky Arguello  [id number]  4 y.o.  1950  Admit date: 1/21/2021  4:27 PM  Discharge date and time: 2/1/2021  Attending: Shilpi Morin DO  PCP:  Negro Reyes MD  Treatment Team: Attending Provider: Shilpi Morin DO; Utilization Review: Rad Byrd; Care Manager: Arsh Bowman.; Primary Nurse: Bradford Molina; Physical Therapy Assistant: Mikey Crigler, PTA    Principal Diagnosis COVID-19   Principal Problem:    COVID-19 (1/21/2021)    Active Problems:    Severe sepsis ([address] (1/23/2021)     Hospital Course: 71 yo hx of DM type II, cor pulmonale, eczema, sCHF EF 55%, paroxysmal a fib on Eliquis, HTN admitted for COVID. Also given doxycycline and ceftriaxone for possible bacterial pneumonia for 7 days. Remdesivir 1/25-1/29. Did not get convalescent plasma. Respiratory therapy has ambulated and will need 4L NC during exertion.      FORTINO seen at 1.71 but now improved to 1.4. Self quarantine from day of diagnosis     Advance Care Planning  People with COVID-19 may have no symptoms, mild symptoms, such as fever, cough, and shortness of breath or they may have more severe illness, developing severe and fatal pneumonia. As a result, Advance Care Planning with attention to naming a health care decision maker (someone you trust to make healthcare decisions for you if you could not speak for yourself) and sharing other health care preferences is important BEFORE a possible health crisis. Please contact your Primary Care Provider to discuss Advance Care Planning.      Preventing the Spread of Coronavirus Disease 2019 in Homes and Residential Communities  For the most recent information go to [email or web address]    Prevention steps for People with confirmed or suspected COVID-19 (including persons under investigation) who do not need to be hospitalized  and   People with confirmed COVID-19 who were hospitalized and determined to be medically stable to go home    Your healthcare provider and public health staff will evaluate whether you can be cared for at home. If it is determined that you do not need to be hospitalized and can be isolated at home, you will be monitored by staff from your local or state health department. You should follow the prevention steps below until a healthcare provider or local or state health department says you can return to your normal activities. Stay home except to get medical care  People who are mildly ill with COVID-19 are able to isolate at home during their illness. You should restrict activities outside your home, except for getting medical care. Do not go to work, school, or public areas. Avoid using public transportation, ride-sharing, or taxis. Separate yourself from other people and animals in your home  People: As much as possible, you should stay in a specific room and away from other people in your home. Also, you should use a separate bathroom, if available. Animals: You should restrict contact with pets and other animals while you are sick with COVID-19, just like you would around other people. Although there have not been reports of pets or other animals becoming sick with COVID-19, it is still recommended that people sick with COVID-19 limit contact with animals until more information is known about the virus. When possible, have another member of your household care for your animals while you are sick. If you are sick with COVID-19, avoid contact with your pet, including petting, snuggling, being kissed or licked, and sharing food. If you must care for your pet or be around animals while you are sick, wash your hands before and after you interact with pets and wear a facemask. Call ahead before visiting your doctor  If you have a medical appointment, call the healthcare provider and tell them that you have or may have COVID-19.  This will help the healthcare providers office take steps to keep other people from getting infected or exposed. Wear a facemask  You should wear a facemask when you are around other people (e.g., sharing a room or vehicle) or pets and before you enter a healthcare providers office. If you are not able to wear a facemask (for example, because it causes trouble breathing), then people who live with you should not stay in the same room with you, or they should wear a facemask if they enter your room. Cover your coughs and sneezes  Cover your mouth and nose with a tissue when you cough or sneeze. Throw used tissues in a lined trash can. Immediately wash your hands with soap and water for at least 20 seconds or, if soap and water are not available, clean your hands with an alcohol-based hand  that contains at least 60% alcohol. Clean your hands often  Wash your hands often with soap and water for at least 20 seconds, especially after blowing your nose, coughing, or sneezing; going to the bathroom; and before eating or preparing food. If soap and water are not readily available, use an alcohol-based hand  with at least 60% alcohol, covering all surfaces of your hands and rubbing them together until they feel dry. Soap and water are the best option if hands are visibly dirty. Avoid touching your eyes, nose, and mouth with unwashed hands. Avoid sharing personal household items  You should not share dishes, drinking glasses, cups, eating utensils, towels, or bedding with other people or pets in your home. After using these items, they should be washed thoroughly with soap and water. Clean all high-touch surfaces everyday  High touch surfaces include counters, tabletops, doorknobs, bathroom fixtures, toilets, phones, keyboards, tablets, and bedside tables. Also, clean any surfaces that may have blood, stool, or body fluids on them. Use a household cleaning spray or wipe, according to the label instructions. Labels contain instructions for safe and effective use of the cleaning product including precautions you should take when applying the product, such as wearing gloves and making sure you have good ventilation during use of the product. Monitor your symptoms  Seek prompt medical attention if your illness is worsening (e.g., difficulty breathing). Before seeking care, call your healthcare provider and tell them that you have, or are being evaluated for, COVID-19. Put on a facemask before you enter the facility. These steps will help the healthcare providers office to keep other people in the office or waiting room from getting infected or exposed. Ask your healthcare provider to call the local or state health department. Persons who are placed under active monitoring or facilitated self-monitoring should follow instructions provided by their local health department or occupational health professionals, as appropriate. When working with your local health department check their available hours. If you have a medical emergency and need to call 911, notify the dispatch personnel that you have, or are being evaluated for COVID-19. If possible, put on a facemask before emergency medical services arrive. Discontinuing home isolation  Patients with confirmed COVID-19 should remain under home isolation precautions until the risk of secondary transmission to others is thought to be low. The decision to discontinue home isolation precautions should be made on a case-by-case basis, in consultation with healthcare providers and state and local health departments. If worsening SOB, chest pain, or AMS, please come back to the ED. Please refer to the admission H&P for details of presentation. In summary, the patient is stable for discharge.      Significant Diagnostic Studies:       Labs: Results:       Chemistry Recent Labs     02/01/21  0902 01/29/21  1349   * 242*   * 138   K 4.7 4.3    102   CO2 23 27   BUN 64* 49*   CREA 1.40 1.34   CA 8.5 8.9   AGAP 11 9    100   TP 6.0* 6.7   ALB 2.2* 2.5*   GLOB 3.8* 4.2*   AGRAT 0.6* 0.6*      CBC w/Diff Recent Labs     02/01/21  0902   WBC 16.5*   RBC 6.59*   HGB 17.6*   HCT 54.8*      GRANS 83*   LYMPH 7*   EOS 0*      Cardiac Enzymes No results for input(s): CPK, CKND1, REBEKA in the last 72 hours. No lab exists for component: CKRMB, TROIP   Coagulation No results for input(s): PTP, INR, APTT, INREXT in the last 72 hours. Lipid Panel No results found for: CHOL, CHOLPOCT, CHOLX, CHLST, CHOLV, 838164, HDL, HDLP, LDL, LDLC, DLDLP, 689250, VLDLC, VLDL, TGLX, TRIGL, TRIGP, TGLPOCT, CHHD, CHHDX   BNP No results for input(s): BNPP in the last 72 hours. Liver Enzymes Recent Labs     02/01/21  0902   TP 6.0*   ALB 2.2*         Thyroid Studies No results found for: T4, T3U, TSH, TSHEXT         Discharge Exam:  Visit Vitals  /79 (BP 1 Location: Right upper arm, BP Patient Position: Sitting)   Pulse 81   Temp 97.4 °F (36.3 °C)   Resp 20   Ht 5' 9\" (1.753 m)   Wt 92.2 kg (203 lb 4.2 oz)   SpO2 92%   BMI 30.02 kg/m²     General appearance: alert, cooperative, no distress, appears stated age  Lungs: clear to auscultation bilaterally  Heart: regular rate and rhythm, S1, S2 normal  Abdomen: soft, non-tender. Bowel sounds normal.   Extremities: no cyanosis or edema  Neurologic: Grossly normal    Disposition: Home Health   Discharge Condition: stable  Patient Instructions: As above   Current Discharge Medication List      START taking these medications    Details   albuterol (PROVENTIL HFA, VENTOLIN HFA, PROAIR HFA) 90 mcg/actuation inhaler Take 2 Puffs by inhalation every six (6) hours as needed for Wheezing or Shortness of Breath. Qty: 1 Inhaler, Refills: 0         CONTINUE these medications which have NOT CHANGED    Details   empagliflozin (Jardiance) 10 mg tablet Take 10 mg by mouth daily.       metFORMIN ER (glucophage XR) 500 mg tablet Take 1,000 mg by mouth daily (with dinner). torsemide (DEMADEX) 100 mg tablet Take 50 mg by mouth daily. Patient takes 1/2 of 100mg tablet for 50mg      insulin glargine (LANTUS) 100 unit/mL injection by SubCUTAneous route nightly. Patient takes \"6 units in the morning and 24 units at night depending on blood sugar\"      magnesium oxide (MAG-OX) 400 mg tablet Take 400 mg by mouth two (2) times a day. potassium chloride SR (K-TAB) 20 mEq tablet Take 20 mEq by mouth daily. ferrous sulfate (IRON) 325 mg (65 mg iron) EC tablet Take 325 mg by mouth three (3) times daily (with meals). pantoprazole (Protonix) 40 mg tablet Take 40 mg by mouth daily. ondansetron (Zofran ODT) 4 mg disintegrating tablet Take 1 Tab by mouth every eight (8) hours as needed for Nausea. Qty: 10 Tab, Refills: 0      apixaban (ELIQUIS) 5 mg tablet Take 5 mg by mouth two (2) times a day. cetirizine (ZYRTEC) 10 mg tablet Take 10 mg by mouth daily. sacubitril-valsartan (ENTRESTO) 24 mg/26 mg tablet Take 1 Tab by mouth two (2) times a day. eplerenone (INSPRA) 25 mg tablet Take  by mouth daily. SITagliptin (JANUVIA) 100 mg tablet Take 100 mg by mouth daily. metoprolol succinate (TOPROL-XL) 100 mg tablet Take 100 mg by mouth daily. nystatin (NYSTOP) powder Apply  to affected area four (4) times daily. pravastatin (PRAVACHOL) 40 mg tablet Take 40 mg by mouth nightly. amiodarone (CORDARONE) 200 mg tablet Take 200 mg by mouth daily. Activity:Up and monique   Diet:ADA, 2gm Na diet   Wound Care:None     Follow-up PCP in one week.    ·     Time spent to discharge patient 35 minutes  Signed:  Lynn Mccoy DO  2/1/2021  1:09 PM

## 2021-02-01 NOTE — PROGRESS NOTES
Care Management Interventions  PCP Verified by CM: Yes  Transition of Care Consult (CM Consult): [address]: Yes  Physical Therapy Consult: Yes  Occupational Therapy Consult: No  Speech Therapy Consult: Yes  Current Support Network: Lives with Spouse  Confirm Follow Up Transport: Other (see comment)  The Plan for Transition of Care is Related to the Following Treatment Goals :  RN/PT  The Patient and/or Patient Representative was Provided with a Choice of Provider and Agrees with the Discharge Plan?: Yes  Name of the Patient Representative Who was Provided with a Choice of Provider and Agrees with the Discharge Plan: patient  Freedom of Choice List was Provided with Basic Dialogue that Supports the Patient's Individualized Plan of Care/Goals, Treatment Preferences and Shares the Quality Data Associated with the Providers?: Yes   Resource Information Provided?: No  Discharge Location  Discharge Placement: Home with home health  Patient is discharging home with [facility] RN/PT. Patient is agreeable to this discharge plan.

## 2021-02-01 NOTE — DIABETES MGMT
Patient admitted with COVID-19. Blood glucose ranged 156-263 yesterday with patient receiving Lantus 70 units and Humalog 30 units. Blood glucose this morning was 114. Reviewed patient current regimen: Lantus 35 units q12h, Humalog 4 units with meals, and Humalog SSI. Noted patient steroid therapy was stopped. Patient would likely benefit from a reduction in prandial and basal insulin to reduce risk of hypoglycemia. Provider updated via [employer] regarding recommendations and patient glycemic control.

## 2021-02-01 NOTE — PROGRESS NOTES
Pt observed resting in bed. A&Ox4. VSS. No signs of distress. Pt stated he is hopeful to go home soon. Tolerates diet well. Very healthy appetite.

## 2021-02-02 PROBLEM — I95.9 HYPOTENSION: Status: ACTIVE | Noted: 2021-02-02

## 2021-02-02 PROBLEM — R09.02 HYPOXIA: Status: ACTIVE | Noted: 2021-02-02

## 2021-02-02 PROBLEM — J12.82 PNEUMONIA DUE TO COVID-19 VIRUS: Status: ACTIVE | Noted: 2021-01-21

## 2021-02-02 PROBLEM — D72.9 NEUTROPHILIC LEUKOCYTOSIS: Status: ACTIVE | Noted: 2021-02-02

## 2021-02-02 PROBLEM — N28.9 ACUTE RENAL INSUFFICIENCY: Status: ACTIVE | Noted: 2021-02-02

## 2021-02-02 PROBLEM — N17.9 AKI (ACUTE KIDNEY INJURY) (HCC): Status: ACTIVE | Noted: 2021-02-02

## 2021-02-02 PROBLEM — E87.20 LACTIC ACIDOSIS: Status: ACTIVE | Noted: 2021-02-02

## 2021-02-02 LAB
APPEARANCE UR: CLEAR
ATRIAL RATE: 64 BPM
BILIRUB UR QL: NEGATIVE
CALCULATED R AXIS, ECG10: 139 DEGREES
CALCULATED T AXIS, ECG11: 98 DEGREES
COLOR UR: YELLOW
D DIMER PPP FEU-MCNC: 2.29 UG/ML(FEU)
DIAGNOSIS, 93000: NORMAL
GLUCOSE BLD STRIP.AUTO-MCNC: 107 MG/DL (ref 65–100)
GLUCOSE BLD STRIP.AUTO-MCNC: 124 MG/DL (ref 65–100)
GLUCOSE BLD STRIP.AUTO-MCNC: 59 MG/DL (ref 65–100)
GLUCOSE BLD STRIP.AUTO-MCNC: 97 MG/DL (ref 65–100)
GLUCOSE UR STRIP.AUTO-MCNC: NEGATIVE MG/DL
HGB UR QL STRIP: NEGATIVE
KETONES UR QL STRIP.AUTO: NEGATIVE MG/DL
LACTATE SERPL-SCNC: 2.6 MMOL/L (ref 0.4–2)
LEUKOCYTE ESTERASE UR QL STRIP.AUTO: NEGATIVE
NITRITE UR QL STRIP.AUTO: NEGATIVE
PH UR STRIP: 5 [PH] (ref 5–9)
PROT UR STRIP-MCNC: NEGATIVE MG/DL
Q-T INTERVAL, ECG07: 412 MS
QRS DURATION, ECG06: 106 MS
QTC CALCULATION (BEZET), ECG08: 475 MS
SP GR UR REFRACTOMETRY: 1.01 (ref 1–1.02)
TSH SERPL DL<=0.005 MIU/L-ACNC: 1.93 UIU/ML (ref 0.36–3.74)
UROBILINOGEN UR QL STRIP.AUTO: 0.2 EU/DL (ref 0.2–1)
VENTRICULAR RATE, ECG03: 80 BPM

## 2021-02-02 PROCEDURE — 97162 PT EVAL MOD COMPLEX 30 MIN: CPT

## 2021-02-02 PROCEDURE — 36415 COLL VENOUS BLD VENIPUNCTURE: CPT

## 2021-02-02 PROCEDURE — 85379 FIBRIN DEGRADATION QUANT: CPT

## 2021-02-02 PROCEDURE — 65270000029 HC RM PRIVATE

## 2021-02-02 PROCEDURE — 74011250636 HC RX REV CODE- 250/636: Performed by: INTERNAL MEDICINE

## 2021-02-02 PROCEDURE — 97530 THERAPEUTIC ACTIVITIES: CPT

## 2021-02-02 PROCEDURE — 82962 GLUCOSE BLOOD TEST: CPT

## 2021-02-02 PROCEDURE — 99222 1ST HOSP IP/OBS MODERATE 55: CPT | Performed by: INTERNAL MEDICINE

## 2021-02-02 PROCEDURE — 77010033678 HC OXYGEN DAILY

## 2021-02-02 PROCEDURE — 83605 ASSAY OF LACTIC ACID: CPT

## 2021-02-02 PROCEDURE — 74011250637 HC RX REV CODE- 250/637: Performed by: INTERNAL MEDICINE

## 2021-02-02 PROCEDURE — 2709999900 HC NON-CHARGEABLE SUPPLY

## 2021-02-02 PROCEDURE — 94760 N-INVAS EAR/PLS OXIMETRY 1: CPT

## 2021-02-02 PROCEDURE — 74011636637 HC RX REV CODE- 636/637: Performed by: INTERNAL MEDICINE

## 2021-02-02 RX ORDER — INSULIN GLARGINE 100 [IU]/ML
10 INJECTION, SOLUTION SUBCUTANEOUS
Status: DISCONTINUED | OUTPATIENT
Start: 2021-02-02 | End: 2021-02-02

## 2021-02-02 RX ORDER — INSULIN GLARGINE 100 [IU]/ML
22 INJECTION, SOLUTION SUBCUTANEOUS
Status: DISCONTINUED | OUTPATIENT
Start: 2021-02-02 | End: 2021-02-03

## 2021-02-02 RX ORDER — LORATADINE 10 MG/1
10 TABLET ORAL DAILY
Status: DISCONTINUED | OUTPATIENT
Start: 2021-02-02 | End: 2021-02-03 | Stop reason: HOSPADM

## 2021-02-02 RX ORDER — METOPROLOL SUCCINATE 50 MG/1
100 TABLET, EXTENDED RELEASE ORAL DAILY
Status: DISCONTINUED | OUTPATIENT
Start: 2021-02-02 | End: 2021-02-02

## 2021-02-02 RX ORDER — MIDODRINE HYDROCHLORIDE 5 MG/1
10 TABLET ORAL
Status: DISCONTINUED | OUTPATIENT
Start: 2021-02-02 | End: 2021-02-03 | Stop reason: HOSPADM

## 2021-02-02 RX ORDER — METOPROLOL SUCCINATE 50 MG/1
50 TABLET, EXTENDED RELEASE ORAL DAILY
Status: DISCONTINUED | OUTPATIENT
Start: 2021-02-02 | End: 2021-02-03 | Stop reason: HOSPADM

## 2021-02-02 RX ORDER — PANTOPRAZOLE SODIUM 40 MG/1
40 TABLET, DELAYED RELEASE ORAL DAILY
Status: DISCONTINUED | OUTPATIENT
Start: 2021-02-02 | End: 2021-02-03 | Stop reason: HOSPADM

## 2021-02-02 RX ORDER — PRAVASTATIN SODIUM 20 MG/1
40 TABLET ORAL
Status: DISCONTINUED | OUTPATIENT
Start: 2021-02-02 | End: 2021-02-03 | Stop reason: HOSPADM

## 2021-02-02 RX ORDER — POTASSIUM CHLORIDE 20 MEQ/1
20 TABLET, EXTENDED RELEASE ORAL DAILY
Status: DISCONTINUED | OUTPATIENT
Start: 2021-02-02 | End: 2021-02-03 | Stop reason: HOSPADM

## 2021-02-02 RX ORDER — AMIODARONE HYDROCHLORIDE 200 MG/1
200 TABLET ORAL DAILY
Status: DISCONTINUED | OUTPATIENT
Start: 2021-02-02 | End: 2021-02-03 | Stop reason: HOSPADM

## 2021-02-02 RX ORDER — SODIUM CHLORIDE 9 MG/ML
100 INJECTION, SOLUTION INTRAVENOUS CONTINUOUS
Status: DISPENSED | OUTPATIENT
Start: 2021-02-02 | End: 2021-02-02

## 2021-02-02 RX ORDER — INSULIN LISPRO 100 [IU]/ML
INJECTION, SOLUTION INTRAVENOUS; SUBCUTANEOUS
Status: DISCONTINUED | OUTPATIENT
Start: 2021-02-02 | End: 2021-02-03 | Stop reason: HOSPADM

## 2021-02-02 RX ORDER — ALBUTEROL SULFATE 90 UG/1
2 AEROSOL, METERED RESPIRATORY (INHALATION)
Status: DISCONTINUED | OUTPATIENT
Start: 2021-02-02 | End: 2021-02-03 | Stop reason: HOSPADM

## 2021-02-02 RX ORDER — MIDODRINE HYDROCHLORIDE 5 MG/1
10 TABLET ORAL ONCE
Status: COMPLETED | OUTPATIENT
Start: 2021-02-02 | End: 2021-02-02

## 2021-02-02 RX ORDER — TORSEMIDE 20 MG/1
50 TABLET ORAL DAILY
Status: DISCONTINUED | OUTPATIENT
Start: 2021-02-02 | End: 2021-02-03 | Stop reason: HOSPADM

## 2021-02-02 RX ORDER — EPLERENONE 25 MG/1
25 TABLET, FILM COATED ORAL DAILY
Status: DISCONTINUED | OUTPATIENT
Start: 2021-02-02 | End: 2021-02-03 | Stop reason: HOSPADM

## 2021-02-02 RX ORDER — INSULIN GLARGINE 100 [IU]/ML
6 INJECTION, SOLUTION SUBCUTANEOUS DAILY
Status: DISCONTINUED | OUTPATIENT
Start: 2021-02-02 | End: 2021-02-03

## 2021-02-02 RX ORDER — LANOLIN ALCOHOL/MO/W.PET/CERES
400 CREAM (GRAM) TOPICAL 2 TIMES DAILY
Status: DISCONTINUED | OUTPATIENT
Start: 2021-02-02 | End: 2021-02-03 | Stop reason: HOSPADM

## 2021-02-02 RX ORDER — NYSTATIN 100000 [USP'U]/G
POWDER TOPICAL 4 TIMES DAILY
Status: DISCONTINUED | OUTPATIENT
Start: 2021-02-02 | End: 2021-02-02

## 2021-02-02 RX ADMIN — SODIUM CHLORIDE 100 ML/HR: 900 INJECTION, SOLUTION INTRAVENOUS at 19:34

## 2021-02-02 RX ADMIN — SODIUM CHLORIDE 100 ML/HR: 900 INJECTION, SOLUTION INTRAVENOUS at 01:59

## 2021-02-02 RX ADMIN — Medication 400 MG: at 09:20

## 2021-02-02 RX ADMIN — INSULIN GLARGINE 22 UNITS: 100 INJECTION, SOLUTION SUBCUTANEOUS at 04:50

## 2021-02-02 RX ADMIN — MIDODRINE HYDROCHLORIDE 10 MG: 5 TABLET ORAL at 05:16

## 2021-02-02 RX ADMIN — PANTOPRAZOLE SODIUM 40 MG: 40 TABLET, DELAYED RELEASE ORAL at 09:20

## 2021-02-02 RX ADMIN — AMIODARONE HYDROCHLORIDE 200 MG: 200 TABLET ORAL at 09:20

## 2021-02-02 RX ADMIN — LORATADINE 10 MG: 10 TABLET ORAL at 09:20

## 2021-02-02 RX ADMIN — METOPROLOL SUCCINATE 50 MG: 50 TABLET, EXTENDED RELEASE ORAL at 09:20

## 2021-02-02 RX ADMIN — MIDODRINE HYDROCHLORIDE 10 MG: 5 TABLET ORAL at 11:56

## 2021-02-02 RX ADMIN — POTASSIUM CHLORIDE 20 MEQ: 20 TABLET, EXTENDED RELEASE ORAL at 09:20

## 2021-02-02 RX ADMIN — SODIUM CHLORIDE 250 ML: 900 INJECTION, SOLUTION INTRAVENOUS at 03:50

## 2021-02-02 RX ADMIN — APIXABAN 5 MG: 5 TABLET, FILM COATED ORAL at 22:05

## 2021-02-02 RX ADMIN — APIXABAN 5 MG: 5 TABLET, FILM COATED ORAL at 09:20

## 2021-02-02 RX ADMIN — MIDODRINE HYDROCHLORIDE 10 MG: 5 TABLET ORAL at 16:32

## 2021-02-02 RX ADMIN — SODIUM CHLORIDE 100 ML/HR: 900 INJECTION, SOLUTION INTRAVENOUS at 09:28

## 2021-02-02 RX ADMIN — PRAVASTATIN SODIUM 40 MG: 20 TABLET ORAL at 22:05

## 2021-02-02 NOTE — PROGRESS NOTES
Pt resting in bed. Respirations present. Safety measures in place.  Report received from Madai MURRAYcaren Alvarez

## 2021-02-02 NOTE — ED PROVIDER NOTES
Patient presents to the ER complaint of weakness and a fall. Patient was recently admitted to the hospital for COVID-19, hypoxia, and acute kidney injury. Was discharged today. When he got home states he became very weak and had a fall. Did not pass out. Denies any chest pain. Reports he did hit his right hip but denies any significant pain. EMS was called, noted be initially hypotensive. Blood pressure in the 80s here upon arrival.    The history is provided by the patient. Fall  The accident occurred 1 to 2 hours ago. The fall occurred while walking. He fell from a height of ground level. The point of impact was the right hip. The pain is present in the right hip. The pain is at a severity of 1/10. The pain is mild. He was not ambulatory at the scene. There was no entrapment after the fall. Pertinent negatives include no fever, no abdominal pain, no hematuria and no laceration. The risk factors include being elderly (Recent hospitalization).          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    S/P total knee replacement using cement 7/20/2011    right knee    Unspecified adverse effect of anesthesia     reports unable to do spinal with right knee due to collapsed discs/ no problem with GA       Past Surgical History:   Procedure Laterality Date    COLONOSCOPY N/A 2/17/2020    COLONOSCOPY performed by Mailni Grayson MD at [facility] ENDOSCOPY    HX ORTHOPAEDIC  July 20,2011    replaced right TKA/ L TKA in Dec 2011         Family History:   Problem Relation Age of Onset    Diabetes Mother     Heart Disease Father     Diabetes Sister     Diabetes Maternal Aunt Social History     Socioeconomic History    Marital status:      Spouse name: Not on file    Number of children: Not on file    Years of education: Not on file    Highest education level: Not on file   Occupational History    Not on file   Social Needs    Financial resource strain: Not on file    Food insecurity     Worry: Not on file     Inability: Not on file    Transportation needs     Medical: Not on file     Non-medical: Not on file   Tobacco Use    Smoking status: Never Smoker    Smokeless tobacco: Never Used   Substance and Sexual Activity    Alcohol use: No    Drug use: Not on file    Sexual activity: Not on file   Lifestyle    Physical activity     Days per week: Not on file     Minutes per session: Not on file    Stress: Not on file   Relationships    Social connections     Talks on phone: Not on file     Gets together: Not on file     Attends Zoroastrianism service: Not on file     Active member of club or organization: Not on file     Attends meetings of clubs or organizations: Not on file     Relationship status: Not on file    Intimate partner violence     Fear of current or ex partner: Not on file     Emotionally abused: Not on file     Physically abused: Not on file     Forced sexual activity: Not on file   Other Topics Concern    Not on file   Social History Narrative    Not on file         ALLERGIES: Adhesive, Other medication, Pcn [penicillins], and Pollen extracts    Review of Systems   Constitutional: Positive for fatigue. Negative for fever and unexpected weight change. HENT: Negative for congestion, dental problem, trouble swallowing and voice change. Eyes: Negative for redness and visual disturbance. Respiratory: Negative for apnea and chest tightness. Cardiovascular: Negative for chest pain and leg swelling. Gastrointestinal: Negative for abdominal distention and abdominal pain. Endocrine: Negative for polydipsia and polyphagia.    Genitourinary: Negative for dysuria, hematuria and urgency. Musculoskeletal: Negative for back pain and gait problem. Skin: Negative for color change. Neurological: Positive for light-headedness. Negative for tremors and weakness. Hematological: Negative for adenopathy. Does not bruise/bleed easily. All other systems reviewed and are negative. Vitals:    02/01/21 2014 02/01/21 2033   BP: (!) 76/59 (!) 89/64   Pulse: 80 83   Resp: 16    Temp: 97.3 °F (36.3 °C)    SpO2: 97% 91%            Physical Exam  Vitals signs and nursing note reviewed. Constitutional:       Appearance: Normal appearance. HENT:      Head: Normocephalic and atraumatic. Nose: Nose normal. No congestion or rhinorrhea. Eyes:      Conjunctiva/sclera: Conjunctivae normal.      Pupils: Pupils are equal, round, and reactive to light. Neck:      Musculoskeletal: Normal range of motion and neck supple. No muscular tenderness. Cardiovascular:      Rate and Rhythm: Normal rate and regular rhythm. Pulses: Normal pulses. Heart sounds: Normal heart sounds. No murmur. No friction rub. Pulmonary:      Effort: Pulmonary effort is normal. No respiratory distress. Breath sounds: No stridor. Rhonchi present. No wheezing. Abdominal:      General: Abdomen is flat. There is no distension. Palpations: Abdomen is soft. There is no mass. Musculoskeletal: Normal range of motion. General: No swelling or deformity. Right hip: He exhibits normal range of motion and no bony tenderness. Skin:     General: Skin is warm. Coloration: Skin is not jaundiced. Findings: No laceration. Neurological:      General: No focal deficit present. Mental Status: He is alert and oriented to person, place, and time. Cranial Nerves: No cranial nerve deficit. Sensory: No sensory deficit. MDM  Number of Diagnoses or Management Options  Diagnosis management comments:  Will Repeat labs here, patient had acute kidney injury. Will check labs to rule out any worsening renal function. Reports he did hit his right hip but no obvious deformity. Will obtain x-ray hip as well as chest x-ray. 12:47 AM  White count is 24,000, which is increased from 16 earlier. Creatinine has increased to 1.8 from 1.4. Blood pressure has improved moderately with gentle hydration. Leukocytosis could be related to recent dexamethasone therapy. No worsening on chest x-ray. Urinalysis negative for infection. Procalcitonin is 0.1. Plan to discuss case with hospitalist.  I do not  feel he needs any new antibiotics. Amount and/or Complexity of Data Reviewed  Clinical lab tests: ordered and reviewed  Tests in the radiology section of CPT®: ordered and reviewed    Risk of Complications, Morbidity, and/or Mortality  Presenting problems: high  Diagnostic procedures: moderate  Management options: moderate           Procedures        Results Include:    Recent Results (from the past 24 hour(s))   GLUCOSE, POC    Collection Time: 02/01/21  5:23 AM   Result Value Ref Range    Glucose (POC) 114 (H) 65 - 100 mg/dL   CBC WITH AUTOMATED DIFF    Collection Time: 02/01/21  9:02 AM   Result Value Ref Range    WBC 16.5 (H) 4.3 - 11.1 K/uL    RBC 6.59 (H) 4.23 - 5.6 M/uL    HGB 17.6 (H) 13.6 - 17.2 g/dL    HCT 54.8 (H) 41.1 - 50.3 %    MCV 83.2 79.6 - 97.8 FL    MCH 26.7 26.1 - 32.9 PG    MCHC 32.1 31.4 - 35.0 g/dL    RDW 18.6 (H) 11.9 - 14.6 %    PLATELET 171 844 - 198 K/uL    MPV 10.1 9.4 - 12.3 FL    ABSOLUTE NRBC 0.00 0.0 - 0.2 K/uL    DF AUTOMATED      NEUTROPHILS 83 (H) 43 - 78 %    LYMPHOCYTES 7 (L) 13 - 44 %    MONOCYTES 6 4.0 - 12.0 %    EOSINOPHILS 0 (L) 0.5 - 7.8 %    BASOPHILS 0 0.0 - 2.0 %    IMMATURE GRANULOCYTES 3 0.0 - 5.0 %    ABS. NEUTROPHILS 13.8 (H) 1.7 - 8.2 K/UL    ABS. LYMPHOCYTES 1.1 0.5 - 4.6 K/UL    ABS. MONOCYTES 1.1 0.1 - 1.3 K/UL    ABS. EOSINOPHILS 0.0 0.0 - 0.8 K/UL    ABS. BASOPHILS 0.1 0.0 - 0.2 K/UL    ABS. IMM. GRANS. 0.5 0.0 - 0.5 K/UL   METABOLIC PANEL, COMPREHENSIVE    Collection Time: 02/01/21  9:02 AM   Result Value Ref Range    Sodium 137 (L) 138 - 145 mmol/L    Potassium 4.7 3.5 - 5.1 mmol/L    Chloride 103 98 - 107 mmol/L    CO2 23 21 - 32 mmol/L    Anion gap 11 7 - 16 mmol/L    Glucose 157 (H) 65 - 100 mg/dL    BUN 64 (H) 8 - 23 MG/DL    Creatinine 1.40 0.8 - 1.5 MG/DL    GFR est AA >60 >60 ml/min/1.73m2    GFR est non-AA 53 (L) >60 ml/min/1.73m2    Calcium 8.5 8.3 - 10.4 MG/DL    Bilirubin, total 0.8 0.2 - 1.1 MG/DL    ALT (SGPT) 134 (H) 12 - 65 U/L    AST (SGOT) 73 (H) 15 - 37 U/L    Alk. phosphatase 104 50 - 136 U/L    Protein, total 6.0 (L) 6.3 - 8.2 g/dL    Albumin 2.2 (L) 3.2 - 4.6 g/dL    Globulin 3.8 (H) 2.3 - 3.5 g/dL    A-G Ratio 0.6 (L) 1.2 - 3.5     GLUCOSE, POC    Collection Time: 02/01/21 11:20 AM   Result Value Ref Range    Glucose (POC) 188 (H) 65 - 100 mg/dL   EKG, 12 LEAD, INITIAL    Collection Time: 02/01/21  8:16 PM   Result Value Ref Range    Ventricular Rate 80 BPM    Atrial Rate 64 BPM    QRS Duration 106 ms    Q-T Interval 412 ms    QTC Calculation (Bezet) 475 ms    Calculated R Axis 139 degrees    Calculated T Axis 98 degrees    Diagnosis       Ventricular-paced rhythm  Abnormal ECG  When compared with ECG of 21-JAN-2021 16:40,  No significant change was found     CBC WITH AUTOMATED DIFF    Collection Time: 02/01/21  8:17 PM   Result Value Ref Range    WBC 24.1 (H) 4.3 - 11.1 K/uL    RBC 6.93 (H) 4.23 - 5.6 M/uL    HGB 18.4 (H) 13.6 - 17.2 g/dL    HCT 56.4 (H) 41.1 - 50.3 %    MCV 81.4 79.6 - 97.8 FL    MCH 26.6 26.1 - 32.9 PG    MCHC 32.6 31.4 - 35.0 g/dL    RDW 18.6 (H) 11.9 - 14.6 %    PLATELET 811 593 - 095 K/uL    MPV 10.5 9.4 - 12.3 FL    ABSOLUTE NRBC 0.00 0.0 - 0.2 K/uL    DF AUTOMATED      NEUTROPHILS 84 (H) 43 - 78 %    LYMPHOCYTES 6 (L) 13 - 44 %    MONOCYTES 6 4.0 - 12.0 %    EOSINOPHILS 0 (L) 0.5 - 7.8 %    BASOPHILS 0 0.0 - 2.0 %    IMMATURE GRANULOCYTES 4 0.0 - 5.0 %    ABS. NEUTROPHILS 20.2 (H) 1.7 - 8.2 K/UL    ABS. LYMPHOCYTES 1.4 0.5 - 4.6 K/UL    ABS. MONOCYTES 1.5 (H) 0.1 - 1.3 K/UL    ABS. EOSINOPHILS 0.0 0.0 - 0.8 K/UL    ABS. BASOPHILS 0.1 0.0 - 0.2 K/UL    ABS. IMM. GRANS. 0.9 (H) 0.0 - 0.5 K/UL   METABOLIC PANEL, COMPREHENSIVE    Collection Time: 02/01/21  8:17 PM   Result Value Ref Range    Sodium 136 136 - 145 mmol/L    Potassium 4.2 3.5 - 5.1 mmol/L    Chloride 99 98 - 107 mmol/L    CO2 24 21 - 32 mmol/L    Anion gap 13 7 - 16 mmol/L    Glucose 273 (H) 65 - 100 mg/dL    BUN 71 (H) 8 - 23 MG/DL    Creatinine 1.88 (H) 0.8 - 1.5 MG/DL    GFR est AA 46 (L) >60 ml/min/1.73m2    GFR est non-AA 38 (L) >60 ml/min/1.73m2    Calcium 8.4 8.3 - 10.4 MG/DL    Bilirubin, total 0.6 0.2 - 1.1 MG/DL    ALT (SGPT) 148 (H) 12 - 65 U/L    AST (SGOT) 78 (H) 15 - 37 U/L    Alk. phosphatase 126 50 - 136 U/L    Protein, total 6.4 6.3 - 8.2 g/dL    Albumin 2.6 (L) 3.2 - 4.6 g/dL    Globulin 3.8 (H) 2.3 - 3.5 g/dL    A-G Ratio 0.7 (L) 1.2 - 3.5     LACTIC ACID    Collection Time: 02/01/21 10:01 PM   Result Value Ref Range    Lactic acid 2.9 (H) 0.4 - 2.0 MMOL/L   PROCALCITONIN    Collection Time: 02/01/21 10:01 PM   Result Value Ref Range    Procalcitonin 0.10 ng/mL   URINALYSIS W/ RFLX MICROSCOPIC    Collection Time: 02/01/21 11:56 PM   Result Value Ref Range    Color YELLOW      Appearance CLEAR      Specific gravity 1.009 1.001 - 1.023      pH (UA) 5.0 5.0 - 9.0      Protein Negative NEG mg/dL    Glucose Negative mg/dL    Ketone Negative NEG mg/dL    Bilirubin Negative NEG      Blood Negative NEG      Urobilinogen 0.2 0.2 - 1.0 EU/dL    Nitrites Negative NEG      Leukocyte Esterase Negative NEG       Voice dictation software was used during the making of this note. This software is not perfect and grammatical and other typographical errors may be present. This note has been proofread, but may still contain errors.   Gorge Dangelo MD; 2/2/2021 @12:48 AM ===================================================================    I wore appropriate PPE throughout this patient's ED visit.  Alonzo Juarez MD, 12:48 AM

## 2021-02-02 NOTE — H&P
[address] Cardiology Initial Cardiac Evaluation                Date of  Admission: 2/1/2021  8:21 PM     PCP: Bradford Allen MD  Requested by: Aaliyah Deng MD  Primary Cardiologist: Dr Edgar Javed [facility]  AP[facility] Attending: Dr Gurwinder Andino    Reason for Evaluation: Cardiomyopathy med recommendations with hypotension on OMT. Chart reviewed as patient with COVID infection and in isolation unit in an effort to limit exposure and preserve PPE. Stan Nielsen is a 72 y.o. male with hx of DM, HTN, obesity, cor pulonale NICM, HFrEF s/p ICD [facility], Cardio Mems device 5/2019, CAD, persistent A Fib, and ZEINA on Bipap with subsequent return of normal systolic function on echo [facility] 9/1/2020. The patient was admitted on Jan 21 through feb 1 for COVID PNA. The patient was discharged  after treatment with remdesivir, dual ABX, but elected not to have convalescent plasma. The patient was released on 4 lpm via NC. The patient returned to the hospital with extreme weakness and falls with systolic BP found to be in the 70's on arrival.  The patient responded well to IV hydration in the ED but was found to have increased leukocytosis with WBC from 16 to 24 and cr at discharge 1.4 and is now 1.8. The patient chest X ray is unchanged from his discharge showing bilateral infiltrates consistent with COVID PNA but no increased oxygen requirements have been noted. Hospital medicine held the patients Demadex, entresto, and started slow gentle hydration. The patient was kept on Toprol XL/eplerenone  with slightly improved BP noted this AM.  Given his hypotension cardiology asked to see patient     Recent Cardiac Synopsis    Echo: 9/2020 · Technically difficult study. · The left ventricular systolic function is normal (55-65%). · Unable to assess left ventricular diastolic function. · The right ventricular systolic function is normal.  · The left atrium is mildly dilated. · Mild aortic valve regurgitation.   EKG: V Paced    Past Medical History:   Diagnosis Date    Arthritis     OA-general/neck    Chronic pain     back    Diabetes ([address]     type 2 dx age 62-fastings avg 80; A1C-7.3 in July '11-today  no S/S hypo    Hypertension     controlled with medication    Obesity     Other ill-defined conditions(799.89)     3 collaspsed discs- L4, L5,S1    S/P Left total knee replacement using cement 12/5/2011    S/P total knee replacement using cement 7/20/2011    right knee    Unspecified adverse effect of anesthesia     reports unable to do spinal with right knee due to collapsed discs/ no problem with GA      Past Surgical History:   Procedure Laterality Date    COLONOSCOPY N/A 2/17/2020    COLONOSCOPY performed by [facility]ld[facility] at [facility] ENDOSCOPY    HX ORTHOPAEDIC  July 20,2011    replaced right TKA/ L TKA in Dec 2011     Allergies   Allergen Reactions    Adhesive Other (comments)     Redness from tegaderm     Other Medication Other (comments)     Reports swelling and shakiness with insect bites    Pcn [Penicillins] Hives    Pollen Extracts Other (comments)     Runny nose, itchy eyes      Family History   Problem Relation Age of Onset    Diabetes Mother     Heart Disease Father     Diabetes Sister     Diabetes Maternal Aunt       Social History     Socioeconomic History    Marital status:      Spouse name: Not on file    Number of children: Not on file    Years of education: Not on file    Highest education level: Not on file   Occupational History    Not on file   Social Needs    Financial resource strain: Not on file    Food insecurity     Worry: Not on file     Inability: Not on file    Transportation needs     Medical: Not on file     Non-medical: Not on file   Tobacco Use    Smoking status: Never Smoker    Smokeless tobacco: Never Used   Substance and Sexual Activity    Alcohol use: No    Drug use: Not on file    Sexual activity: Not on file   Lifestyle    Physical activity     Days per week: Not on file     Minutes per session: Not on file    Stress: Not on file   Relationships    Social connections     Talks on phone: Not on file     Gets together: Not on file     Attends Yarsanism service: Not on file     Active member of club or organization: Not on file     Attends meetings of clubs or organizations: Not on file     Relationship status: Not on file    Intimate partner violence     Fear of current or ex partner: Not on file     Emotionally abused: Not on file     Physically abused: Not on file     Forced sexual activity: Not on file   Other Topics Concern    Not on file   Social History Narrative    Not on file       Prior to Admission Medications   Prescriptions Last Dose Informant Patient Reported? Taking? SITagliptin (JANUVIA) 100 mg tablet   Yes No   Sig: Take 100 mg by mouth daily. albuterol (PROVENTIL HFA, VENTOLIN HFA, PROAIR HFA) 90 mcg/actuation inhaler   No No   Sig: Take 2 Puffs by inhalation every six (6) hours as needed for Wheezing or Shortness of Breath. amiodarone (CORDARONE) 200 mg tablet   Yes No   Sig: Take 200 mg by mouth daily. apixaban (ELIQUIS) 5 mg tablet   Yes No   Sig: Take 5 mg by mouth two (2) times a day. cetirizine (ZYRTEC) 10 mg tablet   Yes No   Sig: Take 10 mg by mouth daily. empagliflozin (Jardiance) 10 mg tablet   Yes No   Sig: Take 10 mg by mouth daily. eplerenone (INSPRA) 25 mg tablet   Yes No   Sig: Take  by mouth daily. ferrous sulfate (IRON) 325 mg (65 mg iron) EC tablet   Yes No   Sig: Take 325 mg by mouth three (3) times daily (with meals). insulin glargine (LANTUS) 100 unit/mL injection   Yes No   Sig: by SubCUTAneous route nightly. Patient takes \"6 units in the morning and 24 units at night depending on blood sugar\"   magnesium oxide (MAG-OX) 400 mg tablet   Yes No   Sig: Take 400 mg by mouth two (2) times a day. metFORMIN ER (glucophage XR) 500 mg tablet   Yes No   Sig: Take 1,000 mg by mouth daily (with dinner).    metoprolol succinate (TOPROL-XL) 100 mg tablet   Yes No   Sig: Take 100 mg by mouth daily. nystatin (NYSTOP) powder   Yes No   Sig: Apply  to affected area four (4) times daily. ondansetron (Zofran ODT) 4 mg disintegrating tablet   No No   Sig: Take 1 Tab by mouth every eight (8) hours as needed for Nausea. pantoprazole (Protonix) 40 mg tablet   Yes No   Sig: Take 40 mg by mouth daily. potassium chloride SR (K-TAB) 20 mEq tablet   Yes No   Sig: Take 20 mEq by mouth daily. pravastatin (PRAVACHOL) 40 mg tablet   Yes No   Sig: Take 40 mg by mouth nightly. sacubitril-valsartan (ENTRESTO) 24 mg/26 mg tablet   Yes No   Sig: Take 1 Tab by mouth two (2) times a day. torsemide (DEMADEX) 100 mg tablet   Yes No   Sig: Take 50 mg by mouth daily. Patient takes 1/2 of 100mg tablet for 50mg      Facility-Administered Medications: None       Current Facility-Administered Medications   Medication Dose Route Frequency    0.9% sodium chloride infusion  100 mL/hr IntraVENous CONTINUOUS    albuterol (PROVENTIL HFA, VENTOLIN HFA, PROAIR HFA) inhaler 2 Puff  2 Puff Inhalation Q6H PRN    amiodarone (CORDARONE) tablet 200 mg  200 mg Oral DAILY    apixaban (ELIQUIS) tablet 5 mg  5 mg Oral BID    . PHARMACY TO SUBSTITUTE PER PROTOCOL (Reordered from: empagliflozin (Jardiance) 10 mg tablet)    Per Protocol    loratadine (CLARITIN) tablet 10 mg  10 mg Oral DAILY    . PHARMACY TO SUBSTITUTE PER PROTOCOL (Reordered from: eplerenone (INSPRA) 25 mg tablet)    Per Protocol    magnesium oxide (MAG-OX) tablet 400 mg  400 mg Oral BID    pantoprazole (PROTONIX) tablet 40 mg  40 mg Oral DAILY    pravastatin (PRAVACHOL) tablet 40 mg  40 mg Oral QHS    [Held by provider] sacubitriL-valsartan (ENTRESTO) 24-26 mg tablet 1 Tab  1 Tab Oral BID    [Held by provider] torsemide (DEMADEX) tablet 50 mg  50 mg Oral DAILY    potassium chloride (K-DUR, KLOR-CON) SR tablet 20 mEq  20 mEq Oral DAILY    metoprolol succinate (TOPROL-XL) XL tablet 50 mg  50 mg Oral DAILY    insulin glargine (LANTUS) injection 22 Units  22 Units SubCUTAneous QHS    insulin glargine (LANTUS) injection 6 Units  6 Units SubCUTAneous DAILY    insulin lispro (HUMALOG) injection   SubCUTAneous AC&HS    midodrine (PROAMATINE) tablet 10 mg  10 mg Oral TID WITH MEALS       Review of Systems   Constitution: Positive for malaise/fatigue. Negative for weight gain and weight loss. HENT: Negative. Eyes: Negative. Cardiovascular: Negative for chest pain (currently pain free), claudication, cyanosis, dyspnea on exertion, irregular heartbeat, leg swelling, near-syncope, orthopnea, palpitations, paroxysmal nocturnal dyspnea and syncope. Respiratory: Negative for cough, shortness of breath and wheezing. Endocrine: Negative. Skin: Negative. Musculoskeletal: Negative. Gastrointestinal: Negative for nausea and vomiting. Genitourinary: Negative. Neurological: Positive for dizziness. Psychiatric/Behavioral: Negative. Allergic/Immunologic: Negative. Physical Exam  Vitals:    02/02/21 0635 02/02/21 0645 02/02/21 0655 02/02/21 0725   BP: 96/65 (!) 95/58 (!) 96/58 114/82   Pulse: 80 80 80 81   Resp: 15 16 16 20   Temp:    98.3 °F (36.8 °C)   SpO2: 92% 94% 94% 93%   Weight:    92.2 kg (203 lb 4.2 oz)       Last 3 Recorded Weights in this Encounter    02/02/21 0725   Weight: 92.2 kg (203 lb 4.2 oz)         Intake/Output Summary (Last 24 hours) at 2/2/2021 0947  Last data filed at 2/2/2021 0440  Gross per 24 hour   Intake 2250 ml   Output    Net 2250 ml       Net IO Since Admission: 2,250 mL [02/02/21 0947]    Physical exam deferred to primary  due to [address] infection for the preservation of PPE and to limit exposures. Per Admitting physician       Physical Exam:  General:          Weak. Alert. Oriented x3  Eyes:               Normal sclera. Extraocular movements intact. ENT:                Normocephalic, atraumatic.   Dry mucous membranes  Neck:               Supple, no lymphadenopathy or JVD  CV:                  Appears regular at time of exam, regular rate no m/r/g. Peripheral pulses 2+. Capillary refill <2s. Lungs:             No gross rales rhonchi or wheezing, symmetric excursion of the chest wall  Abdomen:        Soft, nontender, nondistended. Extremities:     Warm and dry. No cyanosis or edema. Neurologic:      CN II-XII grossly intact. Sensation intact. Skin:                No rashes or jaundice. Normal coloration  Psych:             Normal mood and affect. Recent Results (from the past 24 hour(s))   GLUCOSE, POC    Collection Time: 02/01/21 11:20 AM   Result Value Ref Range    Glucose (POC) 188 (H) 65 - 100 mg/dL   EKG, 12 LEAD, INITIAL    Collection Time: 02/01/21  8:16 PM   Result Value Ref Range    Ventricular Rate 80 BPM    Atrial Rate 64 BPM    QRS Duration 106 ms    Q-T Interval 412 ms    QTC Calculation (Bezet) 475 ms    Calculated R Axis 139 degrees    Calculated T Axis 98 degrees    Diagnosis       Ventricular-paced rhythm  Abnormal ECG  When compared with ECG of 21-JAN-2021 16:40,  No significant change was found  Confirmed by ROSY SHAFER (), NESHA ESQUIVEL ([id number]) on 2/2/2021 5:49:07 AM     CBC WITH AUTOMATED DIFF    Collection Time: 02/01/21  8:17 PM   Result Value Ref Range    WBC 24.1 (H) 4.3 - 11.1 K/uL    RBC 6.93 (H) 4.23 - 5.6 M/uL    HGB 18.4 (H) 13.6 - 17.2 g/dL    HCT 56.4 (H) 41.1 - 50.3 %    MCV 81.4 79.6 - 97.8 FL    MCH 26.6 26.1 - 32.9 PG    MCHC 32.6 31.4 - 35.0 g/dL    RDW 18.6 (H) 11.9 - 14.6 %    PLATELET 128 695 - 825 K/uL    MPV 10.5 9.4 - 12.3 FL    ABSOLUTE NRBC 0.00 0.0 - 0.2 K/uL    DF AUTOMATED      NEUTROPHILS 84 (H) 43 - 78 %    LYMPHOCYTES 6 (L) 13 - 44 %    MONOCYTES 6 4.0 - 12.0 %    EOSINOPHILS 0 (L) 0.5 - 7.8 %    BASOPHILS 0 0.0 - 2.0 %    IMMATURE GRANULOCYTES 4 0.0 - 5.0 %    ABS. NEUTROPHILS 20.2 (H) 1.7 - 8.2 K/UL    ABS. LYMPHOCYTES 1.4 0.5 - 4.6 K/UL    ABS. MONOCYTES 1.5 (H) 0.1 - 1.3 K/UL    ABS.  EOSINOPHILS 0.0 0.0 - 0.8 K/UL    ABS. BASOPHILS 0.1 0.0 - 0.2 K/UL    ABS. IMM. GRANS. 0.9 (H) 0.0 - 0.5 K/UL   METABOLIC PANEL, COMPREHENSIVE    Collection Time: 02/01/21  8:17 PM   Result Value Ref Range    Sodium 136 136 - 145 mmol/L    Potassium 4.2 3.5 - 5.1 mmol/L    Chloride 99 98 - 107 mmol/L    CO2 24 21 - 32 mmol/L    Anion gap 13 7 - 16 mmol/L    Glucose 273 (H) 65 - 100 mg/dL    BUN 71 (H) 8 - 23 MG/DL    Creatinine 1.88 (H) 0.8 - 1.5 MG/DL    GFR est AA 46 (L) >60 ml/min/1.73m2    GFR est non-AA 38 (L) >60 ml/min/1.73m2    Calcium 8.4 8.3 - 10.4 MG/DL    Bilirubin, total 0.6 0.2 - 1.1 MG/DL    ALT (SGPT) 148 (H) 12 - 65 U/L    AST (SGOT) 78 (H) 15 - 37 U/L    Alk.  phosphatase 126 50 - 136 U/L    Protein, total 6.4 6.3 - 8.2 g/dL    Albumin 2.6 (L) 3.2 - 4.6 g/dL    Globulin 3.8 (H) 2.3 - 3.5 g/dL    A-G Ratio 0.7 (L) 1.2 - 3.5     LACTIC ACID    Collection Time: 02/01/21 10:01 PM   Result Value Ref Range    Lactic acid 2.9 (H) 0.4 - 2.0 MMOL/L   PROCALCITONIN    Collection Time: 02/01/21 10:01 PM   Result Value Ref Range    Procalcitonin 0.10 ng/mL   TSH 3RD GENERATION    Collection Time: 02/01/21 10:01 PM   Result Value Ref Range    TSH 1.930 0.358 - 3.740 uIU/mL   URINALYSIS W/ RFLX MICROSCOPIC    Collection Time: 02/01/21 11:56 PM   Result Value Ref Range    Color YELLOW      Appearance CLEAR      Specific gravity 1.009 1.001 - 1.023      pH (UA) 5.0 5.0 - 9.0      Protein Negative NEG mg/dL    Glucose Negative mg/dL    Ketone Negative NEG mg/dL    Bilirubin Negative NEG      Blood Negative NEG      Urobilinogen 0.2 0.2 - 1.0 EU/dL    Nitrites Negative NEG      Leukocyte Esterase Negative NEG     LACTIC ACID    Collection Time: 02/02/21 12:39 AM   Result Value Ref Range    Lactic acid 2.6 (H) 0.4 - 2.0 MMOL/L   GLUCOSE, POC    Collection Time: 02/02/21  8:32 AM   Result Value Ref Range    Glucose (POC) 59 (L) 65 - 100 mg/dL        Initial Recommendations:      Cardiac Problems:    Hypotension:   - Improved with gentle hydration and holding home Diuretic and ARNI. - reasonable to continue low dose Toprol as tolerated, if HR of BP drops further can decreased to 12.5 mg PO   - LVEF normalized on recent echo in Sept 2020 per [facility] records   - hemodynamics now stable   - hold eplerenone if hypotension continues or worsens    Atrial fibrillation    - persistent per primary Cardiologist .  - Continue Toprol XL/amiodarone , has pacer   - EKG V paced 80 BPM   - on Eliquis   - telemetry monitoring may be helpful while inpatient    FORTINO:  - IV hydration per primary team.    COVID19 PNA   - per primary team.  - likely etiology of hypotension   - WBC 24 K     Will need close follow up with primary cardiologist in 1-2 weeks after DC.

## 2021-02-02 NOTE — PROGRESS NOTES
Pt sitting up in bed. Pt on 5 L NC No distress noted at this time. Call light in reach, will monitor.

## 2021-02-02 NOTE — PROGRESS NOTES
MSN, CM:  Patient was admitted 1/21/2021 for vomiting, weakness, shortness of breath, and unable to feed self. Patient was treated with doxycycline, ceftriaxone, decadron, and remdesivir. Patient was discharged 2/1/202 with and increase in home oxygen to 4L [facility], and started albuterol. Patient was readmitted on 2/2/2021 for weakness, fall, and hypotension.

## 2021-02-02 NOTE — ED TRIAGE NOTES
Pt arrives via EMS from home. Pt was discharged from this facility today and was offered rehab and denied it. Pt fell at home this evening. Pt complaining of right ankle pain, right hip pain. Denies hitting head. Pt does take elequis. Pt states he desires to go to a rehab now. Pt hypotensive with a MAP of 65 in triage.

## 2021-02-02 NOTE — H&P
Hospitalist H&P Note     Admit Date:  2021  8:21 PM   Name:  Marky Arguello   Age:  72 y.o.  :  1950   MRN:  [id number]   PCP:  Negro Reyes MD  Treatment Team: Attending Provider: Alireza Chahal MD; Primary Nurse: Marybeth Guillory RN    HPI:   Patient history was obtained from the ER provider prior to seeing the patient. Chief complaintweakness, fall, hypertension  80-year-old male with diabetes, cor pulmonale, chronic diastolic heart failure with preserved ejection fraction, eczema paroxysmal atrial fibrillation rate controlled and on Eliquis for CVA prophylaxis. Admitted  through  with Covid pneumonia. Also treated for bacterial pneumonia with course of doxycycline and ceftriaxone. Received Decadron, remdesivir for COVID-19 but did not receive convalescent plasma. He was discharged home on 4 L nasal cannula. He returned after experiencing extreme weakness at home with falls. On arrival systolic blood pressure was in the 70s but he has responded to IV hydration. His white blood count recently was 16,000 and is now 24,000. His creatinine was 1.4 at discharge and is now 1.8. Chest x-ray essentially unchanged and consistent with bilateral Covid pneumonia. No increased oxygenation requirements. On initial presentation in late January he had acute renal insufficiency with serum creatinine 1.7 but improved during hospital stay. He is being readmitted with acute renal insufficiency, hypotension with fall and significant weakness with COVID-19 pneumonia and underlying cor pulmonale. Other significant comorbidities contribute to patient risk including significant diabetes mellitus paroxysmal A. fib    12systems reviewed and negative except as noted in HPI.   Past Medical History:   Diagnosis Date    Arthritis     OA-general/neck    Chronic pain     back    Diabetes [facility])     type 2 dx age 62-fastings avg 80; A1C-7.3 in -today  no S/S hypo    Hypertension     controlled with medication    Obesity     Other ill-defined conditions(799.89)     3 collaspsed discs- L4, L5,S1    S/P Left total knee replacement using cement 12/5/2011    S/P total knee replacement using cement 7/20/2011    right knee    Unspecified adverse effect of anesthesia     reports unable to do spinal with right knee due to collapsed discs/ no problem with GA      Past Surgical History:   Procedure Laterality Date    COLONOSCOPY N/A 2/17/2020    COLONOSCOPY performed by Sandra Medina MD at [facility]    HX ORTHOPAEDIC  July 20,2011    replaced right TKA/ L TKA in Dec 2011      Allergies   Allergen Reactions    Adhesive Other (comments)     Redness from tegaderm     Other Medication Other (comments)     Reports swelling and shakiness with insect bites    Pcn [Penicillins] Hives    Pollen Extracts Other (comments)     Runny nose, itchy eyes      Social History     Tobacco Use    Smoking status: Never Smoker    Smokeless tobacco: Never Used   Substance Use Topics    Alcohol use: No      Family History   Problem Relation Age of Onset    Diabetes Mother     Heart Disease Father     Diabetes Sister     Diabetes Maternal Aunt       Immunization History   Administered Date(s) Administered    TB Skin Test (PPD) Intradermal 01/25/2021     PTA Medications:  Prior to Admission Medications   Prescriptions Last Dose Informant Patient Reported? Taking? SITagliptin (JANUVIA) 100 mg tablet   Yes No   Sig: Take 100 mg by mouth daily. albuterol (PROVENTIL HFA, VENTOLIN HFA, PROAIR HFA) 90 mcg/actuation inhaler   No No   Sig: Take 2 Puffs by inhalation every six (6) hours as needed for Wheezing or Shortness of Breath. amiodarone (CORDARONE) 200 mg tablet   Yes No   Sig: Take 200 mg by mouth daily. apixaban (ELIQUIS) 5 mg tablet   Yes No   Sig: Take 5 mg by mouth two (2) times a day. cetirizine (ZYRTEC) 10 mg tablet   Yes No   Sig: Take 10 mg by mouth daily.    empagliflozin (Jardiance) 10 mg tablet   Yes No   Sig: Take 10 mg by mouth daily. eplerenone (INSPRA) 25 mg tablet   Yes No   Sig: Take  by mouth daily. ferrous sulfate (IRON) 325 mg (65 mg iron) EC tablet   Yes No   Sig: Take 325 mg by mouth three (3) times daily (with meals). insulin glargine (LANTUS) 100 unit/mL injection   Yes No   Sig: by SubCUTAneous route nightly. Patient takes \"6 units in the morning and 24 units at night depending on blood sugar\"   magnesium oxide (MAG-OX) 400 mg tablet   Yes No   Sig: Take 400 mg by mouth two (2) times a day. metFORMIN ER (glucophage XR) 500 mg tablet   Yes No   Sig: Take 1,000 mg by mouth daily (with dinner). metoprolol succinate (TOPROL-XL) 100 mg tablet   Yes No   Sig: Take 100 mg by mouth daily. nystatin (NYSTOP) powder   Yes No   Sig: Apply  to affected area four (4) times daily. ondansetron (Zofran ODT) 4 mg disintegrating tablet   No No   Sig: Take 1 Tab by mouth every eight (8) hours as needed for Nausea. pantoprazole (Protonix) 40 mg tablet   Yes No   Sig: Take 40 mg by mouth daily. potassium chloride SR (K-TAB) 20 mEq tablet   Yes No   Sig: Take 20 mEq by mouth daily. pravastatin (PRAVACHOL) 40 mg tablet   Yes No   Sig: Take 40 mg by mouth nightly. sacubitril-valsartan (ENTRESTO) 24 mg/26 mg tablet   Yes No   Sig: Take 1 Tab by mouth two (2) times a day. torsemide (DEMADEX) 100 mg tablet   Yes No   Sig: Take 50 mg by mouth daily.  Patient takes 1/2 of 100mg tablet for 50mg      Facility-Administered Medications: None       Objective:     Patient Vitals for the past 24 hrs:   Temp Pulse Resp BP SpO2   02/02/21 0041  80 17 92/67 92 %   02/02/21 0011  80  (!) 88/59 91 %   02/01/21 2347  81 17 (!) 87/61 92 %   02/01/21 2334  80  (!) 88/60 94 %   02/01/21 2204  80  93/61 97 %   02/01/21 2136  84 (!) 39 103/68 92 %   02/01/21 2113  (!) 106  (!) 84/63 92 %   02/01/21 2058  80  (!) 88/64 92 %   02/01/21 2052  80  (!) 85/64 (!) 89 %   02/01/21 2033  83  (!) 89/64 91 %   02/01/21 2014 97.3 °F (36.3 °C) 80 16 (!) 76/59 97 %     Oxygen Therapy  O2 Sat (%): 92 % (02/02/21 0041)  Pulse via Oximetry: 80 beats per minute (02/02/21 0041)  O2 Device: Room air (02/01/21 2014)    Intake/Output Summary (Last 24 hours) at 2/2/2021 0149  Last data filed at 2/1/2021 2346  Gross per 24 hour   Intake 1000 ml   Output    Net 1000 ml       Physical Exam:  General:    Weak. Alert. Oriented x3  Eyes:   Normal sclera. Extraocular movements intact. ENT:  Normocephalic, atraumatic. Dry mucous membranes  Neck:  Supple, no lymphadenopathy or JVD  CV:   Appears regular at time of exam, regular rate no m/r/g. Peripheral pulses 2+. Capillary refill <2s. Lungs:  No gross rales rhonchi or wheezing, symmetric excursion of the chest wall  Abdomen: Soft, nontender, nondistended. Extremities: Warm and dry. No cyanosis or edema. Neurologic: CN II-XII grossly intact. Sensation intact. Skin:     No rashes or jaundice. Normal coloration  Psych:  Normal mood and affect. I reviewed the labs, imaging, EKGs, telemetry, and other studies done this admission. Data Review:   Recent Results (from the past 24 hour(s))   GLUCOSE, POC    Collection Time: 02/01/21  5:23 AM   Result Value Ref Range    Glucose (POC) 114 (H) 65 - 100 mg/dL   CBC WITH AUTOMATED DIFF    Collection Time: 02/01/21  9:02 AM   Result Value Ref Range    WBC 16.5 (H) 4.3 - 11.1 K/uL    RBC 6.59 (H) 4.23 - 5.6 M/uL    HGB 17.6 (H) 13.6 - 17.2 g/dL    HCT 54.8 (H) 41.1 - 50.3 %    MCV 83.2 79.6 - 97.8 FL    MCH 26.7 26.1 - 32.9 PG    MCHC 32.1 31.4 - 35.0 g/dL    RDW 18.6 (H) 11.9 - 14.6 %    PLATELET 956 563 - 248 K/uL    MPV 10.1 9.4 - 12.3 FL    ABSOLUTE NRBC 0.00 0.0 - 0.2 K/uL    DF AUTOMATED      NEUTROPHILS 83 (H) 43 - 78 %    LYMPHOCYTES 7 (L) 13 - 44 %    MONOCYTES 6 4.0 - 12.0 %    EOSINOPHILS 0 (L) 0.5 - 7.8 %    BASOPHILS 0 0.0 - 2.0 %    IMMATURE GRANULOCYTES 3 0.0 - 5.0 %    ABS.  NEUTROPHILS 13.8 (H) 1.7 - 8.2 K/UL    ABS. LYMPHOCYTES 1.1 0.5 - 4.6 K/UL    ABS. MONOCYTES 1.1 0.1 - 1.3 K/UL    ABS. EOSINOPHILS 0.0 0.0 - 0.8 K/UL    ABS. BASOPHILS 0.1 0.0 - 0.2 K/UL    ABS. IMM. GRANS. 0.5 0.0 - 0.5 K/UL   METABOLIC PANEL, COMPREHENSIVE    Collection Time: 02/01/21  9:02 AM   Result Value Ref Range    Sodium 137 (L) 138 - 145 mmol/L    Potassium 4.7 3.5 - 5.1 mmol/L    Chloride 103 98 - 107 mmol/L    CO2 23 21 - 32 mmol/L    Anion gap 11 7 - 16 mmol/L    Glucose 157 (H) 65 - 100 mg/dL    BUN 64 (H) 8 - 23 MG/DL    Creatinine 1.40 0.8 - 1.5 MG/DL    GFR est AA >60 >60 ml/min/1.73m2    GFR est non-AA 53 (L) >60 ml/min/1.73m2    Calcium 8.5 8.3 - 10.4 MG/DL    Bilirubin, total 0.8 0.2 - 1.1 MG/DL    ALT (SGPT) 134 (H) 12 - 65 U/L    AST (SGOT) 73 (H) 15 - 37 U/L    Alk.  phosphatase 104 50 - 136 U/L    Protein, total 6.0 (L) 6.3 - 8.2 g/dL    Albumin 2.2 (L) 3.2 - 4.6 g/dL    Globulin 3.8 (H) 2.3 - 3.5 g/dL    A-G Ratio 0.6 (L) 1.2 - 3.5     GLUCOSE, POC    Collection Time: 02/01/21 11:20 AM   Result Value Ref Range    Glucose (POC) 188 (H) 65 - 100 mg/dL   EKG, 12 LEAD, INITIAL    Collection Time: 02/01/21  8:16 PM   Result Value Ref Range    Ventricular Rate 80 BPM    Atrial Rate 64 BPM    QRS Duration 106 ms    Q-T Interval 412 ms    QTC Calculation (Bezet) 475 ms    Calculated R Axis 139 degrees    Calculated T Axis 98 degrees    Diagnosis       Ventricular-paced rhythm  Abnormal ECG  When compared with ECG of 21-JAN-2021 16:40,  No significant change was found     CBC WITH AUTOMATED DIFF    Collection Time: 02/01/21  8:17 PM   Result Value Ref Range    WBC 24.1 (H) 4.3 - 11.1 K/uL    RBC 6.93 (H) 4.23 - 5.6 M/uL    HGB 18.4 (H) 13.6 - 17.2 g/dL    HCT 56.4 (H) 41.1 - 50.3 %    MCV 81.4 79.6 - 97.8 FL    MCH 26.6 26.1 - 32.9 PG    MCHC 32.6 31.4 - 35.0 g/dL    RDW 18.6 (H) 11.9 - 14.6 %    PLATELET 271 690 - 894 K/uL    MPV 10.5 9.4 - 12.3 FL    ABSOLUTE NRBC 0.00 0.0 - 0.2 K/uL    DF AUTOMATED      NEUTROPHILS 84 (H) 43 - 78 %    LYMPHOCYTES 6 (L) 13 - 44 %    MONOCYTES 6 4.0 - 12.0 %    EOSINOPHILS 0 (L) 0.5 - 7.8 %    BASOPHILS 0 0.0 - 2.0 %    IMMATURE GRANULOCYTES 4 0.0 - 5.0 %    ABS. NEUTROPHILS 20.2 (H) 1.7 - 8.2 K/UL    ABS. LYMPHOCYTES 1.4 0.5 - 4.6 K/UL    ABS. MONOCYTES 1.5 (H) 0.1 - 1.3 K/UL    ABS. EOSINOPHILS 0.0 0.0 - 0.8 K/UL    ABS. BASOPHILS 0.1 0.0 - 0.2 K/UL    ABS. IMM. GRANS. 0.9 (H) 0.0 - 0.5 K/UL   METABOLIC PANEL, COMPREHENSIVE    Collection Time: 02/01/21  8:17 PM   Result Value Ref Range    Sodium 136 136 - 145 mmol/L    Potassium 4.2 3.5 - 5.1 mmol/L    Chloride 99 98 - 107 mmol/L    CO2 24 21 - 32 mmol/L    Anion gap 13 7 - 16 mmol/L    Glucose 273 (H) 65 - 100 mg/dL    BUN 71 (H) 8 - 23 MG/DL    Creatinine 1.88 (H) 0.8 - 1.5 MG/DL    GFR est AA 46 (L) >60 ml/min/1.73m2    GFR est non-AA 38 (L) >60 ml/min/1.73m2    Calcium 8.4 8.3 - 10.4 MG/DL    Bilirubin, total 0.6 0.2 - 1.1 MG/DL    ALT (SGPT) 148 (H) 12 - 65 U/L    AST (SGOT) 78 (H) 15 - 37 U/L    Alk.  phosphatase 126 50 - 136 U/L    Protein, total 6.4 6.3 - 8.2 g/dL    Albumin 2.6 (L) 3.2 - 4.6 g/dL    Globulin 3.8 (H) 2.3 - 3.5 g/dL    A-G Ratio 0.7 (L) 1.2 - 3.5     LACTIC ACID    Collection Time: 02/01/21 10:01 PM   Result Value Ref Range    Lactic acid 2.9 (H) 0.4 - 2.0 MMOL/L   PROCALCITONIN    Collection Time: 02/01/21 10:01 PM   Result Value Ref Range    Procalcitonin 0.10 ng/mL   URINALYSIS W/ RFLX MICROSCOPIC    Collection Time: 02/01/21 11:56 PM   Result Value Ref Range    Color YELLOW      Appearance CLEAR      Specific gravity 1.009 1.001 - 1.023      pH (UA) 5.0 5.0 - 9.0      Protein Negative NEG mg/dL    Glucose Negative mg/dL    Ketone Negative NEG mg/dL    Bilirubin Negative NEG      Blood Negative NEG      Urobilinogen 0.2 0.2 - 1.0 EU/dL    Nitrites Negative NEG      Leukocyte Esterase Negative NEG     LACTIC ACID    Collection Time: 02/02/21 12:39 AM   Result Value Ref Range    Lactic acid 2.6 (H) 0.4 - 2.0 MMOL/L       All Micro Results     Procedure Component Value Units Date/Time    CULTURE, BLOOD [[id number]] Collected: 02/01/21 2201    Order Status: Completed Specimen: Blood Updated: 02/01/21 2238    CULTURE, BLOOD [[id number]] Collected: 02/01/21 2202    Order Status: Completed Specimen: Blood Updated: 02/01/21 2238          Other Studies:  Xr Chest Sngl V    Result Date: 2/1/2021  EXAM: XR CHEST SNGL V INDICATION: dyspnea COMPARISON: 1/24/2021 FINDINGS: A portable AP radiograph of the chest was obtained at 2128 hours. The patient is on a cardiac monitor. Patchy bilateral groundglass opacities unchanged. The cardiac and mediastinal contours and pulmonary vascularity are normal.  The bones and soft tissues are grossly within normal limits. Patchy bilateral pneumonia unchanged. Xr Hip Rt W Or Wo Pelv 2-3 Vws    Result Date: 2/1/2021  EXAM:  XR HIP RT W OR WO PELV 2-3 VWS INDICATION:   fall COMPARISON: None. FINDINGS: An AP view of the pelvis and a frogleg lateral view of the right hip demonstrate no fracture, dislocation or other abnormality. Normal right hip.        Assessment and Plan:     Hospital Problems as of 2/2/2021 Date Reviewed: 2/2/2021          Codes Class Noted - Resolved POA    * (Principal) Hypotension ICD-10-CM: I95.9  ICD-9-CM: 458.9  2/2/2021 - Present Unknown        Acute renal insufficiency ICD-10-CM: N28.9  ICD-9-CM: 593.9  2/2/2021 - Present Unknown        Hypoxia ICD-10-CM: R09.02  ICD-9-CM: 799.02  2/2/2021 - Present Unknown        Lactic acidosis ICD-10-CM: E87.2  ICD-9-CM: 276.2  2/2/2021 - Present Unknown        Neutrophilic leukocytosis [id number]: D72.9  ICD-9-CM: 288.8  2/2/2021 - Present Unknown        Pneumonia due to COVID-19 virus ICD-10-CM: U07.1, J12.82  ICD-9-CM: 480.8  1/21/2021 - Present Unknown        Cor pulmonale (chronic) (HCC) ICD-10-CM: I27.81  ICD-9-CM: 416.9  8/13/2019 - Present Yes        Paroxysmal atrial fibrillation ([facility] ICD-10-CM: I48.0  ICD-9-CM: 427.31  8/13/2019 - Present Yes        Diabetes mellitus type 2, controlled ([facility] [address] ICD-10-CM: E11.9  ICD-9-CM: 250.00  12/5/2011 - Present Yes              PLAN:  --Hypotension  Hold Demadex, hold Entresto and significant attenuation of Toprol-XL. Cardiology consult regarding medication recommendations. I could not locate recent echocardiogram but per prior hospitalist notes and discharge summary preserved LVEF55%. IV fluid x15 hours and monitor closely regarding Covid pneumonia. --Acute renal insufficiency   Follow-up after gentle IV hydration 15 hoursavoid overhydration given Covid pneumonia    --Leukocytosisfollow-up after IV hydrationazotemia notedrecent course of Decadronsignificantly increased within 12 hours from dischargeif not improved with hydration consider work-up for occult bacterial/other infection. Possible worsening Covid    --Lactic acidosisvery likely related to the transient hypotension. Follow-upavoid Metformin for now    --Hypoxiasecondary to Covid pneumonia     --Diabetes mellitus type 2 controlledcontinue home meds. Close to home dosing Lantus and utilize additional sliding scale prandial coverage. Trulicity not available. --Paroxysmal A. fibcontinue Toprol-XL rate control and Eliquis therapy. Subcubatrol being held    Discharge planning: Home  DVT ppx: Eliquis    Code status: Full code  Decision Maker: Selfcontacts on file      Admit status: Inpatient           Estimated LOS: Greater than 2 midnights  Risk:  high    Signed:  Maico Pineda DO

## 2021-02-02 NOTE — ACP (ADVANCE CARE PLANNING)
POORNIMA MURPHY:  Patient completed ACP on 1/26/2021 with POORNIMA Gay. Wishes remain the same per patient.

## 2021-02-02 NOTE — PROGRESS NOTES
Pt re-admitted overnight for near fall once getting discharged back home. He states he did not pass out or lose consciousness. He was lightheaded and dizzy, c/w with hypotensive episode on re-admission. Patient seen by cards today and agree with holding Entresto and Torsemide at this time. Will cont on IVF and hold home Entresto and torsemide. Will cont to monitor BP and volume status overnight. Potential plan for discharge to rehab tomorrow pending stable vital signs. No charge to patient as he was admitted on 2/2/20.

## 2021-02-02 NOTE — PROGRESS NOTES
TRANSFER - IN REPORT:    Verbal report received from Rafita(name) on Tono Norton  being received from ED(unit) for routine progression of care      Report consisted of patients Situation, Background, Assessment and   Recommendations(SBAR). Information from the following report(s) SBAR was reviewed with the receiving nurse. Opportunity for questions and clarification was provided.

## 2021-02-02 NOTE — PROGRESS NOTES
ACUTE PHYSICAL THERAPY GOALS:  (Developed with and agreed upon by patient and/or caregiver.)  STG:  (1.)Mr. Bob will move from supine to sit and sit to supine  with MINIMAL ASSIST within 5 treatment day(s). (2.)Mr. Bob will transfer from bed to chair and chair to bed with CONTACT GUARD ASSIST using the least restrictive device within 5 treatment day(s). (3.)Mr. Bob will ambulate with MINIMAL ASSIST for 40 feet with the least restrictive device within 5 treatment day(s). LTG:  (1.)Mr. Bob will move from supine to sit and sit to supine  in bed with SUPERVISION within 10 treatment day(s). (2.)Mr. Bob will transfer from bed to chair and chair to bed with SUPERVISION using the least restrictive device within 10 treatment day(s). (3.)Mr. Bob will ambulate with CONTACT GUARD ASSIST for 80 feet with the least restrictive device within 10 treatment day(s).   ________________________________________________________________________________________________      PHYSICAL THERAPY ASSESSMENT: Initial Assessment PT Treatment Day # 1      Abby Bahena is a 72 y.o. male   PRIMARY DIAGNOSIS: Hypotension  Hypotension [I95.9]  [facility] [N17.9]       Reason for Referral:     ICD-10: Treatment Diagnosis: Generalized Muscle Weakness (M62.81)  Other abnormalities of gait and mobility (R26.89)  [place]PAT[place] / [place] / Product Type: Medicare /     ASSESSMENT:     REHAB RECOMMENDATIONS:   Recommendation to date pending progress:  Setting:   Short-term Rehab  Equipment:    To Be Determined     PRIOR LEVEL OF FUNCTION:  (Prior to Hospitalization) INITIAL/CURRENT LEVEL OF FUNCTION:  (Most Recently Demonstrated)   Bed Mobility:   Independent  Sit to Stand:   Independent  Transfers:   Independent  Gait/Mobility:   Independent Bed Mobility:   Moderate Assistance  Sit to Stand:   Minimal Assistance  Transfers:   Minimal Assistance  Gait/Mobility:   Minimal Assistance     ASSESSMENT:  Mr. Jin Hare is a 71 y/o male adm. W/ FORTINO & hypernatremia 2/2 COVID and discharged home with fall, readmitted to hospital, referred to PT for evaluation & treatment. Pt. Is currently MIN A for mobility secondary to decreased strength, endurance, & balance and ambulation next to bed 3 feet with fair dynamic balance. Pt. Is mobilizing below his baseline & benefits from cont. PT to address. SUBJECTIVE:   Mr. Jin Hare states, \"I wasn't ready to go home yet. I'll go to rehab this time. 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 He mobilizes w/ SPC or RW in the home & drives. Pt. Reports being I w/ ADLS/IADLS, but reports he was struggling functionally since coming down with COVID. Home Environment: Private residence  # Steps to Enter: 0  One/Two Story Residence: One story  Living Alone: No  Support Systems: Spouse/Significant Other/Partner  OBJECTIVE:     PAIN: VITAL SIGNS: LINES/DRAINS:   Pre Treatment: Pain Screen  Pain Scale 1: Visual  Pain Intensity 1: 0  Post Treatment:  No specific pain reported at this time. 5 liters 02  ___  O2 Device: Nasal cannula     GROSS EVALUATION: Within Functional Limits Abnormal/ Functional Abnormal/ Non-Functional (see comments) Not Tested Comments:   AROM [x] [] [] []    PROM [x] [] [] []    Strength [] [x] [] [] Grossly >3+/5, diminished B LEs proximal>distal   Balance [] [x] [] [] Decreased static & dynamic standing balance   Posture [] [x] [] [] Pt.  Excessively flexed @ trunk & head w/ kyphotic spine   Sensation [] [] [] [x]    Coordination [x] [] [] []    Tone [x] [] [] []    Edema [x] [] [] []    Activity Tolerance [] [x] [] [] Pt. Limited to bed to chair only secondary to fatigue    [] [] [] []      COGNITION/  PERCEPTION: Intact Impaired   (see comments) Comments:   Orientation [x] []    Vision [x] []    Hearing [x] []    Command Following [x] []    Safety Awareness [x] []     [] []      MOBILITY: I Mod I S SBA CGA Min Mod Max Total  NT x2 Comments:   Bed Mobility    Rolling [] [] [] [] [] [x] [] [] [] [] []    Supine to Sit [] [] [] [] [] [x] [] [] [] [] []    Scooting [] [] [] [] [] [x] [] [] [] [] [] Using draw pad   Sit to Supine [] [] [] [] [] [x] [] [] [] [] []    Transfers    Sit to Stand [] [] [] [] [] [x] [] [] [] [] []    Bed to Chair [] [] [] [] [] [x] [] [] [] [] [] Using RW   Stand to Sit [] [] [] [] [] [x] [] [] [] [] []    I=Independent, Mod I=Modified Independent, S=Supervision, SBA=Standby Assistance, CGA=Contact Guard Assistance,   Min=Minimal Assistance, Mod=Moderate Assistance, Max=Maximal Assistance, Total=Total Assistance, NT=Not Tested  GAIT: I Mod I S SBA CGA Min Mod Max Total  NT x2 Comments:   Level of Assistance [] [] [] [] [] [x] [] [] [] [] []    Distance 3'    DME Rolling Walker    Gait Quality Posture flexed w/ increased time required, narrow PHILL & foot flat gait pattern w/ increased B stance time observed    Weightbearing Status N/A     I=Independent, Mod I=Modified Independent, S=Supervision, SBA=Standby Assistance, CGA=Contact Guard Assistance,   Min=Minimal Assistance, Mod=Moderate Assistance, Max=Maximal Assistance, Total=Total Assistance, NT=Not Tested    [address] [facility] [address] [id number] AM-PAC 6 Clicks   Basic Mobility Inpatient Short Form       How much difficulty does the patient currently have. .. Unable A Lot A Little None   1. Turning over in bed (including adjusting bedclothes, sheets and blankets)? [] 1   [] 2   [x] 3   [] 4   2. Sitting down on and standing up from a chair with arms ( e.g., wheelchair, bedside commode, etc.)   [] 1   [] 2   [x] 3   [] 4   3. Moving from lying on back to sitting on the side of the bed? [] 1   [x] 2   [] 3   [] 4   How much help from another person does the patient currently need. .. Total A Lot A Little None   4. Moving to and from a bed to a chair (including a wheelchair)?    [] 1   [] 2   [x] 3 [] 4   5. Need to walk in hospital room? [] 1   [x] 2   [] 3   [] 4   6. Climbing 3-5 steps with a railing? [x] 1   [] 2   [] 3   [] 4   © 2007, Trustees of [facility] [employer], under license to [employer]. All rights reserved     Score:  Initial: 14 Most Recent: X (Date: -- )    Interpretation of Tool:  Represents activities that are increasingly more difficult (i.e. Bed mobility, Transfers, Gait). PLAN:   FREQUENCY/DURATION: PT Plan of Care: 3 times/week for duration of hospital stay or until stated goals are met, whichever comes first.    PROBLEM LIST:   (Skilled intervention is medically necessary to address:)  1. Decreased ADL/Functional Activities  2. Decreased Activity Tolerance  3. Decreased AROM/PROM  4. Decreased Balance  5. Decreased Gait Ability  6. Decreased Strength  7. Decreased Transfer Abilities   INTERVENTIONS PLANNED:   (Benefits and precautions of physical therapy have been discussed with the patient.)  1. Therapeutic Activity  2. Therapeutic Exercise/HEP  3. Neuromuscular Re-education  4. Gait Training  5. Manual Therapy  6. Education     TREATMENT:     EVALUATION: Moderate Complexity : (Untimed Charge)    TREATMENT:   ($$ Therapeutic Activity: 23-37 mins    )  Therapeutic Activity (23 Minutes): Therapeutic activity included Rolling, Supine to Sit, Scooting, Transfer Training, Ambulation on level ground, Sitting balance  and Standing balance to improve functional Mobility, Strength and Activity tolerance.     AFTER TREATMENT POSITION/PRECAUTIONS:  Alarm Activated, Bed, Needs within reach and RN notified    INTERDISCIPLINARY COLLABORATION:  RN/PCT and PT/PTA    TOTAL TREATMENT DURATION:  PT Patient Time In/Time Out  Time In: 1155  Time Out: [facility]

## 2021-02-02 NOTE — PROGRESS NOTES
MSN, CM:  Patient discharged home yesterday afternoon after declining SNF. Patient returned overnight r/t sob and fall. Patient is now interested in SNF. Spoke with [facility] which is were his wife is at present, and patient can go there also. PT and OT to be consulted for evaluation and recommendations. Case Management will continue to follow for discharge needs.     Care Management Interventions  PCP Verified by CM: Yes(Dr. Matty Hugo)  Mode of Transport at Discharge: BLS([facility])  Transition of Care Consult (CM Consult): Discharge Planning  Physical Therapy Consult: Yes  Occupational Therapy Consult: Yes  Current Support Network: Own Home  Confirm Follow Up Transport: Self  Freedom of Choice List was Provided with Basic Dialogue that Supports the Patient's Individualized Plan of Care/Goals, Treatment Preferences and Shares the Quality Data Associated with the Providers?: Yes  Discharge Location  Discharge Placement: Skilled nursing facility

## 2021-02-02 NOTE — PROGRESS NOTES
Pt arrived to room 804 via stretcher from ER. Pt alert oriented times 3 at this time. Pt reports feeling much better than when coming to the hospital. Pt on 5 L NC. No distress noted at this time. Pt has redness to groin area. No skin breakdown noted at this time. Pt skin assessed with Ti Ha RN. Pt oriented to room and surroundings. Pt encouraged to call for assistance if needed call light in reach, will monitor.

## 2021-02-02 NOTE — ED NOTES
TRANSFER - OUT REPORT:    Verbal report given to Kathleen Camargo 12 on Khadra Cary  being transferred to [phone] for routine progression of care       Report consisted of patients Situation, Background, Assessment and   Recommendations(SBAR). Information from the following report(s) SBAR, ED Summary and MAR was reviewed with the receiving nurse. Lines:   Peripheral IV 02/01/21 Left Wrist (Active)       Peripheral IV 02/01/21 Left Hand (Active)   Site Assessment Clean, dry, & intact 02/01/21 2204   Phlebitis Assessment 0 02/01/21 2204   Infiltration Assessment 0 02/01/21 2204   Dressing Status Clean, dry, & intact 02/01/21 2204       Peripheral IV 02/01/21 Right Hand (Active)   Site Assessment Clean, dry, & intact 02/01/21 2204   Phlebitis Assessment 0 02/01/21 2204   Infiltration Assessment 0 02/01/21 2204   Dressing Status Clean, dry, & intact 02/01/21 2204        Opportunity for questions and clarification was provided.       Patient transported with:   O2 @ 5 liters  Tech

## 2021-02-03 VITALS
BODY MASS INDEX: 30.02 KG/M2 | RESPIRATION RATE: 18 BRPM | WEIGHT: 203.26 LBS | DIASTOLIC BLOOD PRESSURE: 68 MMHG | HEART RATE: 80 BPM | OXYGEN SATURATION: 96 % | SYSTOLIC BLOOD PRESSURE: 97 MMHG | TEMPERATURE: 97.7 F

## 2021-02-03 LAB
ALBUMIN SERPL-MCNC: 1.9 G/DL (ref 3.2–4.6)
ALBUMIN/GLOB SERPL: 0.7 {RATIO} (ref 1.2–3.5)
ALP SERPL-CCNC: 81 U/L (ref 50–136)
ALT SERPL-CCNC: 92 U/L (ref 12–65)
ANION GAP SERPL CALC-SCNC: 5 MMOL/L (ref 7–16)
AST SERPL-CCNC: 52 U/L (ref 15–37)
BASOPHILS # BLD: 0 K/UL (ref 0–0.2)
BASOPHILS NFR BLD: 0 % (ref 0–2)
BILIRUB SERPL-MCNC: 0.5 MG/DL (ref 0.2–1.1)
BUN SERPL-MCNC: 35 MG/DL (ref 8–23)
CALCIUM SERPL-MCNC: 8 MG/DL (ref 8.3–10.4)
CHLORIDE SERPL-SCNC: 110 MMOL/L (ref 98–107)
CO2 SERPL-SCNC: 27 MMOL/L (ref 21–32)
CREAT SERPL-MCNC: 0.88 MG/DL (ref 0.8–1.5)
DIFFERENTIAL METHOD BLD: ABNORMAL
EOSINOPHIL # BLD: 0.1 K/UL (ref 0–0.8)
EOSINOPHIL NFR BLD: 1 % (ref 0.5–7.8)
ERYTHROCYTE [DISTWIDTH] IN BLOOD BY AUTOMATED COUNT: 17.8 % (ref 11.9–14.6)
GLOBULIN SER CALC-MCNC: 2.7 G/DL (ref 2.3–3.5)
GLUCOSE BLD STRIP.AUTO-MCNC: 122 MG/DL (ref 65–100)
GLUCOSE BLD STRIP.AUTO-MCNC: 62 MG/DL (ref 65–100)
GLUCOSE SERPL-MCNC: 76 MG/DL (ref 65–100)
HCT VFR BLD AUTO: 45.3 % (ref 41.1–50.3)
HGB BLD-MCNC: 14.9 G/DL (ref 13.6–17.2)
IMM GRANULOCYTES # BLD AUTO: 0.4 K/UL (ref 0–0.5)
IMM GRANULOCYTES NFR BLD AUTO: 3 % (ref 0–5)
LYMPHOCYTES # BLD: 1.3 K/UL (ref 0.5–4.6)
LYMPHOCYTES NFR BLD: 9 % (ref 13–44)
MAGNESIUM SERPL-MCNC: 2 MG/DL (ref 1.8–2.4)
MCH RBC QN AUTO: 26.9 PG (ref 26.1–32.9)
MCHC RBC AUTO-ENTMCNC: 32.9 G/DL (ref 31.4–35)
MCV RBC AUTO: 81.9 FL (ref 79.6–97.8)
MONOCYTES # BLD: 0.9 K/UL (ref 0.1–1.3)
MONOCYTES NFR BLD: 6 % (ref 4–12)
NEUTS SEG # BLD: 12.2 K/UL (ref 1.7–8.2)
NEUTS SEG NFR BLD: 82 % (ref 43–78)
NRBC # BLD: 0 K/UL (ref 0–0.2)
PLATELET # BLD AUTO: 204 K/UL (ref 150–450)
PMV BLD AUTO: 10.2 FL (ref 9.4–12.3)
POTASSIUM SERPL-SCNC: 4.2 MMOL/L (ref 3.5–5.1)
PROT SERPL-MCNC: 4.6 G/DL (ref 6.3–8.2)
RBC # BLD AUTO: 5.53 M/UL (ref 4.23–5.6)
SODIUM SERPL-SCNC: 142 MMOL/L (ref 138–145)
WBC # BLD AUTO: 14.9 K/UL (ref 4.3–11.1)

## 2021-02-03 PROCEDURE — 83735 ASSAY OF MAGNESIUM: CPT

## 2021-02-03 PROCEDURE — 82962 GLUCOSE BLOOD TEST: CPT

## 2021-02-03 PROCEDURE — 80053 COMPREHEN METABOLIC PANEL: CPT

## 2021-02-03 PROCEDURE — 85025 COMPLETE CBC W/AUTO DIFF WBC: CPT

## 2021-02-03 PROCEDURE — 74011250637 HC RX REV CODE- 250/637: Performed by: INTERNAL MEDICINE

## 2021-02-03 PROCEDURE — 2709999900 HC NON-CHARGEABLE SUPPLY

## 2021-02-03 RX ORDER — INSULIN GLARGINE 100 [IU]/ML
18 INJECTION, SOLUTION SUBCUTANEOUS
Qty: 1 VIAL | Refills: 1 | Status: SHIPPED | OUTPATIENT
Start: 2021-02-03

## 2021-02-03 RX ORDER — METOPROLOL SUCCINATE 100 MG/1
50 TABLET, EXTENDED RELEASE ORAL DAILY
Qty: 15 TAB | Refills: 0 | Status: SHIPPED | OUTPATIENT
Start: 2021-02-03 | End: 2021-03-05

## 2021-02-03 RX ORDER — INSULIN GLARGINE 100 [IU]/ML
18 INJECTION, SOLUTION SUBCUTANEOUS
Status: DISCONTINUED | OUTPATIENT
Start: 2021-02-03 | End: 2021-02-03 | Stop reason: HOSPADM

## 2021-02-03 RX ORDER — MIDODRINE HYDROCHLORIDE 10 MG/1
10 TABLET ORAL
Qty: 90 TAB | Refills: 0 | Status: SHIPPED | OUTPATIENT
Start: 2021-02-03 | End: 2021-03-05

## 2021-02-03 RX ADMIN — APIXABAN 5 MG: 5 TABLET, FILM COATED ORAL at 08:14

## 2021-02-03 RX ADMIN — MIDODRINE HYDROCHLORIDE 10 MG: 5 TABLET ORAL at 11:26

## 2021-02-03 RX ADMIN — POTASSIUM CHLORIDE 20 MEQ: 20 TABLET, EXTENDED RELEASE ORAL at 08:14

## 2021-02-03 RX ADMIN — MIDODRINE HYDROCHLORIDE 10 MG: 5 TABLET ORAL at 08:14

## 2021-02-03 RX ADMIN — AMIODARONE HYDROCHLORIDE 200 MG: 200 TABLET ORAL at 08:13

## 2021-02-03 RX ADMIN — METOPROLOL SUCCINATE 50 MG: 50 TABLET, EXTENDED RELEASE ORAL at 08:14

## 2021-02-03 RX ADMIN — LORATADINE 10 MG: 10 TABLET ORAL at 08:14

## 2021-02-03 RX ADMIN — PANTOPRAZOLE SODIUM 40 MG: 40 TABLET, DELAYED RELEASE ORAL at 08:14

## 2021-02-03 NOTE — PROGRESS NOTES
Pt resting in bed. Respirations present on 5L NC. No signs of distress. No needs expressed. Safety measures in place.  Report given to Fouzia Sanon

## 2021-02-03 NOTE — PROGRESS NOTES
MSN, CM:  Patient to be discharged today to [address] [place] [address] for rehab services. Patient agrees with this discharge plan. Patient has met all milestones for this admission. [facility] to transport patient to facility.     Care Management Interventions  PCP Verified by CM: Yes(Dr. Cheng Elias)  Mode of Transport at Discharge: BLS([facility])  Transition of Care Consult (CM Consult): SNF  Partner SNF: Yes  Physical Therapy Consult: Yes  Occupational Therapy Consult: Yes  Current Support Network: Own Home  Confirm Follow Up Transport: Self  The Plan for Transition of Care is Related to the Following Treatment Goals : Short term rehab to regain strength back to baseline  The Patient and/or Patient Representative was Provided with a Choice of Provider and Agrees with the Discharge Plan?: Yes  Name of the Patient Representative Who was Provided with a Choice of Provider and Agrees with the Discharge Plan: Mr. Narda Manley (patient)  Freedom of Choice List was Provided with Basic Dialogue that Supports the Patient's Individualized Plan of Care/Goals, Treatment Preferences and Shares the Quality Data Associated with the Providers?: Yes  Discharge Location  Discharge Placement: Skilled nursing facility

## 2021-02-03 NOTE — DISCHARGE INSTRUCTIONS
We have stopped mutliple of your heart medications due to low Blood pressures during this hospital readmission. Please stop taking torsemide, entresto, and eplerenone while you recover from COVID-19. You need to follow up with your cardiologist or primary care physician to determine when these medications can be started back. DISCHARGE SUMMARY from Nurse    PATIENT INSTRUCTIONS:    After general anesthesia or intravenous sedation, for 24 hours or while taking prescription Narcotics:  · Limit your activities  · Do not drive and operate hazardous machinery  · Do not make important personal or business decisions  · Do  not drink alcoholic beverages  · If you have not urinated within 8 hours after discharge, please contact your surgeon on call. What to do at Home:  Recommended activity: Activity as tolerated. If you experience any of the following symptoms worsening cough or wheezing, shortness of breath or fatigue not relieved with rest, unrelieved pain, nausea or vomiting, please follow up with MD.    *  Please give a list of your current medications to your Primary Care Provider. *  Please update this list whenever your medications are discontinued, doses are      changed, or new medications (including over-the-counter products) are added. *  Please carry medication information at all times in case of emergency situations. These are general instructions for a healthy lifestyle:    No smoking/ No tobacco products/ Avoid exposure to second hand smoke  Surgeon General's Warning:  Quitting smoking now greatly reduces serious risk to your health.     Obesity, smoking, and sedentary lifestyle greatly increases your risk for illness    A healthy diet, regular physical exercise & weight monitoring are important for maintaining a healthy lifestyle    You may be retaining fluid if you have a history of heart failure or if you experience any of the following symptoms:  Weight gain of 3 pounds or more overnight or 5 pounds in a week, increased swelling in our hands or feet or shortness of breath while lying flat in bed. Please call your doctor as soon as you notice any of these symptoms; do not wait until your next office visit. The discharge information has been reviewed with the patient. The patient verbalized understanding. Discharge medications reviewed with the patient and appropriate educational materials and side effects teaching were provided. Advance Care Planning  People with COVID-19 may have no symptoms, mild symptoms, such as fever, cough, and shortness of breath or they may have more severe illness, developing severe and fatal pneumonia. As a result, Advance Care Planning with attention to naming a health care decision maker (someone you trust to make healthcare decisions for you if you could not speak for yourself) and sharing other health care preferences is important BEFORE a possible health crisis. Please contact your Primary Care Provider to discuss Advance Care Planning. Preventing the Spread of Coronavirus Disease 2019 in Homes and Residential Communities  For the most recent information go to [email or web address]    Prevention steps for People with confirmed or suspected COVID-19 (including persons under investigation) who do not need to be hospitalized  and   People with confirmed COVID-19 who were hospitalized and determined to be medically stable to go home    Your healthcare provider and public health staff will evaluate whether you can be cared for at home. If it is determined that you do not need to be hospitalized and can be isolated at home, you will be monitored by staff from your local or state health department. You should follow the prevention steps below until a healthcare provider or local or state health department says you can return to your normal activities.   Stay home except to get medical care  People who are mildly ill with COVID-19 are able to isolate at home during their illness. You should restrict activities outside your home, except for getting medical care. Do not go to work, school, or public areas. Avoid using public transportation, ride-sharing, or taxis. Separate yourself from other people and animals in your home  People: As much as possible, you should stay in a specific room and away from other people in your home. Also, you should use a separate bathroom, if available. Animals: You should restrict contact with pets and other animals while you are sick with COVID-19, just like you would around other people. Although there have not been reports of pets or other animals becoming sick with COVID-19, it is still recommended that people sick with COVID-19 limit contact with animals until more information is known about the virus. When possible, have another member of your household care for your animals while you are sick. If you are sick with COVID-19, avoid contact with your pet, including petting, snuggling, being kissed or licked, and sharing food. If you must care for your pet or be around animals while you are sick, wash your hands before and after you interact with pets and wear a facemask. Call ahead before visiting your doctor  If you have a medical appointment, call the healthcare provider and tell them that you have or may have COVID-19. This will help the healthcare providers office take steps to keep other people from getting infected or exposed. Wear a facemask  You should wear a facemask when you are around other people (e.g., sharing a room or vehicle) or pets and before you enter a healthcare providers office. If you are not able to wear a facemask (for example, because it causes trouble breathing), then people who live with you should not stay in the same room with you, or they should wear a facemask if they enter your room.   Cover your coughs and sneezes  Cover your mouth and nose with a tissue when you cough or sneeze. Throw used tissues in a lined trash can. Immediately wash your hands with soap and water for at least 20 seconds or, if soap and water are not available, clean your hands with an alcohol-based hand  that contains at least 60% alcohol. Clean your hands often  Wash your hands often with soap and water for at least 20 seconds, especially after blowing your nose, coughing, or sneezing; going to the bathroom; and before eating or preparing food. If soap and water are not readily available, use an alcohol-based hand  with at least 60% alcohol, covering all surfaces of your hands and rubbing them together until they feel dry. Soap and water are the best option if hands are visibly dirty. Avoid touching your eyes, nose, and mouth with unwashed hands. Avoid sharing personal household items  You should not share dishes, drinking glasses, cups, eating utensils, towels, or bedding with other people or pets in your home. After using these items, they should be washed thoroughly with soap and water. Clean all high-touch surfaces everyday  High touch surfaces include counters, tabletops, doorknobs, bathroom fixtures, toilets, phones, keyboards, tablets, and bedside tables. Also, clean any surfaces that may have blood, stool, or body fluids on them. Use a household cleaning spray or wipe, according to the label instructions. Labels contain instructions for safe and effective use of the cleaning product including precautions you should take when applying the product, such as wearing gloves and making sure you have good ventilation during use of the product. Monitor your symptoms  Seek prompt medical attention if your illness is worsening (e.g., difficulty breathing). Before seeking care, call your healthcare provider and tell them that you have, or are being evaluated for, COVID-19. Put on a facemask before you enter the facility.  These steps will help the healthcare providers office to keep other people in the office or waiting room from getting infected or exposed. Ask your healthcare provider to call the local or state health department. Persons who are placed under active monitoring or facilitated self-monitoring should follow instructions provided by their local health department or occupational health professionals, as appropriate. When working with your local health department check their available hours. If you have a medical emergency and need to call 911, notify the dispatch personnel that you have, or are being evaluated for COVID-19. If possible, put on a facemask before emergency medical services arrive. Discontinuing home isolation  Patients with confirmed COVID-19 should remain under home isolation precautions until the risk of secondary transmission to others is thought to be low. The decision to discontinue home isolation precautions should be made on a case-by-case basis, in consultation with healthcare providers and state and [facility] health departments. _Patient Education        Weakness: Care Instructions  Your Care Instructions     Weakness is a lack of physical or muscle strength. You may feel that you need to make extra effort to move your arms, legs, or other muscles. Generalized weakness means that you feel weak in most areas of your body. Another type of weakness may affect just one muscle or group of muscles. You may feel weak and tired after you have done too much activity, such as taking an extra-long hike. This is not a serious problem. It often goes away on its own. Feeling weak can also be caused by medical conditions like thyroid problems, depression, or a virus. Sometimes the cause can be serious. Your doctor may want to do more tests to try to find the cause of the weakness. The doctor has checked you carefully, but problems can develop later.  If you notice any problems or new symptoms, get medical treatment right away.  Follow-up care is a key part of your treatment and safety. Be sure to make and go to all appointments, and call your doctor if you are having problems. It's also a good idea to know your test results and keep a list of the medicines you take. How can you care for yourself at home? · Rest when you feel tired. · Be safe with medicines. If your doctor prescribed medicine, take it exactly as prescribed. Call your doctor if you think you are having a problem with your medicine. You will get more details on the specific medicines your doctor prescribes. · Do not skip meals. Eating a balanced diet may increase your energy level. · Get some physical activity every day, but do not get too tired. When should you call for help? Call your doctor now or seek immediate medical care if:    · You have new or worse weakness.     · You are dizzy or lightheaded, or you feel like you may faint. Watch closely for changes in your health, and be sure to contact your doctor if:    · You do not get better as expected. Where can you learn more? Go to http://www.gray.com/  Enter V492 in the search box to learn more about \"Weakness: Care Instructions. \"  Current as of: June 26, 2019               Content Version: 12.6  © 8559-7295 [employer]. Care instructions adapted under license by [employer] (which disclaims liability or warranty for this information). If you have questions about a medical condition or this instruction, always ask your healthcare professional. [place] [address] any warranty or liability for your use of this information. _Patient Education        Preventing Falls: Care Instructions  Your Care Instructions     Getting around your home safely can be a challenge if you have injuries or health problems that make it easy for you to fall.  Loose rugs and furniture in walkways are among the dangers for many older people who have problems walking or who have poor eyesight. People who have conditions such as arthritis, osteoporosis, or dementia also have to be careful not to fall. You can make your home safer with a few simple measures. Follow-up care is a key part of your treatment and safety. Be sure to make and go to all appointments, and call your doctor if you are having problems. It's also a good idea to know your test results and keep a list of the medicines you take. How can you care for yourself at home? Taking care of yourself  · You may get dizzy if you do not drink enough water. To prevent dehydration, drink plenty of fluids, enough so that your urine is light yellow or clear like water. Choose water and other caffeine-free clear liquids. If you have kidney, heart, or liver disease and have to limit fluids, talk with your doctor before you increase the amount of fluids you drink. · Exercise regularly to improve your strength, muscle tone, and balance. Walk if you can. Swimming may be a good choice if you cannot walk easily. · Have your vision and hearing checked each year or any time you notice a change. If you have trouble seeing and hearing, you might not be able to avoid objects and could lose your balance. · Know the side effects of the medicines you take. Ask your doctor or pharmacist whether the medicines you take can affect your balance. Sleeping pills or sedatives can affect your balance. · Limit the amount of alcohol you drink. Alcohol can impair your balance and other senses. · Ask your doctor whether calluses or corns on your feet need to be removed. If you wear loose-fitting shoes because of calluses or corns, you can lose your balance and fall. · Talk to your doctor if you have numbness in your feet. Preventing falls at home  · Remove raised doorway thresholds, throw rugs, and clutter. Repair loose carpet or raised areas in the floor. · Move furniture and electrical cords to keep them out of walking paths.   · Use nonskid floor wax, and wipe up spills right away, especially on ceramic tile floors. · If you use a walker or cane, put rubber tips on it. If you use crutches, clean the bottoms of them regularly with an abrasive pad, such as steel wool. · Keep your house well lit, especially [place], and outside walkways. Use night-lights in areas such as hallways and bathrooms. Add extra light switches or use remote switches (such as switches that go on or off when you clap your hands) to make it easier to turn lights on if you have to get up during the night. · Install sturdy handrails on stairways. · Move items in your cabinets so that the things you use a lot are on the lower shelves (about waist level). · Keep a cordless phone and a flashlight with new batteries by your bed. If possible, put a phone in each of the main rooms of your house, or carry a cell phone in case you fall and cannot reach a phone. Or, you can wear a device around your neck or wrist. You push a button that sends a signal for help. · Wear low-heeled shoes that fit well and give your feet good support. Use footwear with nonskid soles. Check the heels and soles of your shoes for wear. Repair or replace worn heels or soles. · Do not wear socks without shoes on wood floors. · Walk on the grass when the sidewalks are slippery. If you live in an area that gets snow and ice in the winter, sprinkle salt on slippery steps and sidewalks. Preventing falls in the bath  · Install grab bars and nonskid mats inside and outside your shower or tub and near the toilet and sinks. · Use shower chairs and bath benches. · Use a hand-held shower head that will allow you to sit while showering. · Get into a tub or shower by putting the weaker leg in first. Get out of a tub or shower with your strong side first.  · Repair loose toilet seats and consider installing a raised toilet seat to make getting on and off the toilet easier.   · Keep your bathroom door unlocked while you are in the shower. Where can you learn more? Go to http://www.[employer].com/  Enter G117 in the search box to learn more about \"Preventing Falls: Care Instructions. \"  Current as of: April 15, 2020               Content Version: 12.6  © 4907-9620 [employer]. Care instructions adapted under license by [employer] (which disclaims liability or warranty for this information). If you have questions about a medical condition or this instruction, always ask your healthcare professional. [address] any warranty or liability for your use of this information.        _________________________________________________________________________________________________________________________________

## 2021-02-03 NOTE — DISCHARGE SUMMARY
.      Hospitalist Discharge Summary     Admit Date:  2021  8:21 PM   DC note date: 2/3/2021  Name:  Hyacinth Garner   Age:  72 y.o.  :  1950   MRN:  [id number]   PCP:  Savannah Baron MD  Treatment Team: Attending Provider: Millicent Ortiz MD; Consulting Provider: Gila Dela Cruz NP; Hospitalist: Millicent Ortiz MD; Utilization Review: Gia Pichardo; Care Manager: Eleonora Mclean RN; Physical Therapist: Monica Cantu PT; Occupational Therapist: Hailey Mederos OT; Primary Nurse: Tommy Hall RN    Problem List for this Hospitalization:  Hospital Problems as of 2/3/2021 Date Reviewed: 2021          Codes Class Noted - Resolved POA    * (Principal) Hypotension ICD-10-CM: I95.9  ICD-9-CM: 458.9  2021 - Present Unknown        Acute renal insufficiency ICD-10-CM: N28.9  ICD-9-CM: 593.9  2021 - Present Unknown        Hypoxia ICD-10-CM: R09.02  ICD-9-CM: 799.02  2021 - Present Unknown        Lactic acidosis ICD-10-CM: E87.2  ICD-9-CM: 276.2  2021 - Present Unknown        Neutrophilic leukocytosis O-97-DU: D72.9  ICD-9-CM: 288.8  2021 - Present Unknown        FORTINO (acute kidney injury) ([facility] ICD-10-CM: N17.9  ICD-9-CM: 584.9  2021 - Present Unknown        Pneumonia due to COVID-19 virus ICD-10-CM: U07.1, J12.82  ICD-9-CM: 480.8  2021 - Present Unknown        Cor pulmonale (chronic) ([facility] ICD-10-CM: I27.81  ICD-9-CM: 416.9  2019 - Present Yes        Paroxysmal atrial fibrillation ([facility] ICD-10-CM: I48.0  ICD-9-CM: 427.31  2019 - Present Yes        Diabetes mellitus type 2, controlled ([facility] ICD-10-CM: E11.9  ICD-9-CM: 250.00  2011 - Present Yes                Admission HPI from 2021:    \"Chief complaintweakness, fall, hypotension  41-year-old male with diabetes, cor pulmonale, chronic diastolic heart failure with preserved ejection fraction, eczema paroxysmal atrial fibrillation rate controlled and on Eliquis for CVA prophylaxis. Admitted January 21 through February 1 with Covid pneumonia. Also treated for bacterial pneumonia with course of doxycycline and ceftriaxone. Received Decadron, remdesivir for COVID-19 but did not receive convalescent plasma. He was discharged home on 4 L nasal cannula. He returned after experiencing extreme weakness at home with falls. On arrival systolic blood pressure was in the 70s but he has responded to IV hydration. His white blood count recently was 16,000 and is now 24,000. His creatinine was 1.4 at discharge and is now 1.8. Chest x-ray essentially unchanged and consistent with bilateral Covid pneumonia. No increased oxygenation requirements.     On initial presentation in late January he had acute renal insufficiency with serum creatinine 1.7 but improved during hospital stay. He is being readmitted with acute renal insufficiency, hypotension with fall and significant weakness with COVID-19 pneumonia and underlying cor pulmonale. Other significant comorbidities contribute to patient risk including significant diabetes mellitus paroxysmal A. fib  \"    Hospital Course:  Patient had short readmission hospitalization this time. He was admitted due to hypotension after going home. His home torsemide and entresto were initially held. He was started on midodrine TID as well. He had positive orthostatics on day of discharge so his eplerenone was also discontinued prior to discharge. He had low blood sugars on aggressive insulin regimen. His long acting lantus was decreased and scheduled humalog was discontinued. He was advised to follow up closely with his PCP for diabetes management as well as his cardiologist for further evaluation and management of labile blood pressures. He also presented with FORTINO and he was given IVF during hospitalization. He was encouraged to continue with good PO intake. His FORTINO resolved after IVF rescusitation.  All other chronic medical conditions were stable during hospitalization. Disposition: Rehab Facility  Activity: Activity as tolerated  Diet: DIET DIABETIC CONSISTENT CARB Regular  Code Status: Prior    Follow Up Orders: Follow-up Appointments   Procedures    FOLLOW UP VISIT Appointment in: One Week     Standing Status:   Standing     Number of Occurrences:   1     Order Specific Question:   Appointment in     Answer: One Week       Follow-up Information     Follow up With Specialties Details Why Jose Orr MD Internal Medicine In 1 week  [address]   [phone]            Discharge meds at bottom of this note. Plan was discussed with patient. All questions answered. Patient was stable at time of discharge. Given instructions to call a physician or return if any concerns. Discharge summary and encounter summary was sent to PCP electronically via \"Comm Mgt\" link in [id number] outdoors, if possible. Diagnostic Imaging/Tests:   Xr Chest Sngl V    Result Date: 2/1/2021  EXAM: XR CHEST SNGL V INDICATION: dyspnea COMPARISON: 1/24/2021 FINDINGS: A portable AP radiograph of the chest was obtained at 2128 hours. The patient is on a cardiac monitor. Patchy bilateral groundglass opacities unchanged. The cardiac and mediastinal contours and pulmonary vascularity are normal.  The bones and soft tissues are grossly within normal limits. Patchy bilateral pneumonia unchanged. Xr Chest Sngl V    Result Date: 1/24/2021  EXAM: Single view chest radiograph. INDICATION: Worsening hypoxia, Covid 19 positive. COMPARISON: Chest radiograph dated January 21, 2021. FINDINGS: No pneumothorax or pleural effusion. There are diffuse patchy reticular lung markings with peripheral and basilar predominance which are new from prior. Triple lead cardiac pacer evident. Indeterminate radiopaque foreign body likely a medical device overlies the left hilum, present since at least August 2019.     1.  Interval development of peripheral and basilar predominant reticular lung markings suspicious for atypical pneumonia. Xr Hip Rt W Or Wo Pelv 2-3 Vws    Result Date: 2/1/2021  EXAM:  XR HIP RT W OR WO PELV 2-3 VWS INDICATION:   fall COMPARISON: None. FINDINGS: An AP view of the pelvis and a frogleg lateral view of the right hip demonstrate no fracture, dislocation or other abnormality. Normal right hip. Xr Chest Port    Result Date: 1/21/2021  EXAM: XR CHEST PORT INDICATION: shob COMPARISON: Chest x-ray, 1/16/2021 FINDINGS: The cardiomediastinal silhouette is within normal limits. AICD leads are intact. No pleural effusion or pneumothorax. No focal parenchymal process. No acute osseous abnormality. AICD without acute cardiopulmonary abnormality. Xr Chest Port    Result Date: 1/16/2021  PORTABLE CHEST, January 16, 2021 at 1832 hours CLINICAL HISTORY:  Cough with clinical suspicion of Covid. COMPARISON:  August 12, 2019 FINDINGS:  AP semierect images demonstrate no confluent infiltrate or significant pleural fluid. The heart size is within normal limits without evidence of congestive heart failure or pneumothorax. The bony thorax appears intact on this view. IMPRESSION:  NO ACUTE CARDIOPULMONARY DISEASE IDENTIFIED. Echocardiogram results:  No results found for this visit on 02/01/21.     Procedures done this admission:  * No surgery found *    All Micro Results     Procedure Component Value Units Date/Time    CULTURE, BLOOD [[id number]] Collected: 02/01/21 2201    Order Status: Completed Specimen: Blood Updated: 02/02/21 1151     Special Requests: --        NO SPECIAL REQUESTS  LEFT  HAND       Culture result: NO GROWTH AFTER 13 HOURS       CULTURE, BLOOD [[id number]] Collected: 02/01/21 2202    Order Status: Completed Specimen: Blood Updated: 02/02/21 1151     Special Requests: --        RIGHT  HAND       Culture result: NO GROWTH AFTER 13 HOURS             SARS-CoV-2 Lab Results  \"Novel Coronavirus\" Test: No results found for: COV2NT   \"Emergent Disease\" Test: No results found for: EDPR  \"SARS-COV-2\" Test: No results found for: XGCOVT  Rapid Test: No results found for: COVR         Labs: Results:       BMP, Mg, Phos Recent Labs     02/03/21 0349 02/01/21 2017 02/01/21  0902    136 137*   K 4.2 4.2 4.7   * 99 103   CO2 27 24 23   AGAP 5* 13 11   BUN 35* 71* 64*   CREA 0.88 1.88* 1.40   CA 8.0* 8.4 8.5   GLU 76 273* 157*   MG 2.0  --   --       CBC Recent Labs     02/03/21 0349 02/01/21 2017 02/01/21  0902   WBC 14.9* 24.1* 16.5*   RBC 5.53 6.93* 6.59*   HGB 14.9 18.4* 17.6*   HCT 45.3 56.4* 54.8*    338 236   GRANS 82* 84* 83*   LYMPH 9* 6* 7*   EOS 1 0* 0*   MONOS 6 6 6   BASOS 0 0 0   IG 3 4 3   ANEU 12.2* 20.2* 13.8*   ABL 1.3 1.4 1.1   ANG 0.1 0.0 0.0   ABM 0.9 1.5* 1.1   ABB 0.0 0.1 0.1   AIG 0.4 0.9* 0.5      LFT Recent Labs     02/03/21 0349 02/01/21 2017 02/01/21  0902   ALT 92* 148* 134*   AP 81 126 104   TP 4.6* 6.4 6.0*   ALB 1.9* 2.6* 2.2*   GLOB 2.7 3.8* 3.8*   AGRAT 0.7* 0.7* 0.6*      Cardiac Testing Lab Results   Component Value Date/Time    BNP 2,942 (H) 08/12/2019 04:08 PM      Coagulation Tests Lab Results   Component Value Date/Time    Prothrombin time 10.7 11/28/2011 09:23 AM    Prothrombin time 10.5 06/28/2011 12:13 PM    INR 1.0 11/28/2011 09:23 AM    INR 1.0 06/28/2011 12:13 PM    aPTT 28.5 11/28/2011 09:23 AM    aPTT 27.3 06/28/2011 12:13 PM      A1c Lab Results   Component Value Date/Time    Hemoglobin A1c 7.6 (H) 01/23/2021 07:41 AM    Hemoglobin A1c 6.5 (H) 07/21/2011 05:20 AM      Lipid Panel No results found for: CHOL, CHOLPOCT, CHOLX, CHLST, CHOLV, 032823, HDL, HDLP, LDL, LDLC, DLDLP, 572331, VLDLC, VLDL, TGLX, TRIGL, TRIGP, TGLPOCT, CHHD, [facility]   Thyroid Panel Lab Results   Component Value Date/Time    TSH 1.930 02/01/2021 10:01 PM        Most Recent UA Lab Results   Component Value Date/Time    Color YELLOW 02/01/2021 11:56 PM    Appearance CLEAR 02/01/2021 11:56 PM    Specific gravity 1.009 02/01/2021 11:56 PM    pH (UA) 5.0 02/01/2021 11:56 PM    Protein Negative 02/01/2021 11:56 PM    Glucose Negative 02/01/2021 11:56 PM    Ketone Negative 02/01/2021 11:56 PM    Bilirubin Negative 02/01/2021 11:56 PM    Blood Negative 02/01/2021 11:56 PM    Urobilinogen 0.2 02/01/2021 11:56 PM    Nitrites Negative 02/01/2021 11:56 PM    Leukocyte Esterase Negative 02/01/2021 11:56 PM    WBC 0-3 01/21/2021 06:44 PM    RBC 0-3 01/21/2021 06:44 PM    Epithelial cells 0-3 07/21/2011 12:00 AM    Bacteria TRACE 01/21/2021 06:44 PM    Casts HYALINE 01/21/2021 06:44 PM    Crystals, urine 0 07/21/2011 12:00 AM    Mucus TRACE 01/21/2021 06:44 PM    Other observations RESULTS VERIFIED MANUALLY 01/21/2021 06:44 PM        Allergies   Allergen Reactions    Adhesive Other (comments)     Redness from tegaderm     Other Medication Other (comments)     Reports swelling and shakiness with insect bites    Pcn [Penicillins] Hives    Pollen Extracts Other (comments)     Runny nose, itchy eyes     Immunization History   Administered Date(s) Administered    TB Skin Test (PPD) Intradermal 01/25/2021       All Labs from Last 24 Hrs:  Recent Results (from the past 24 hour(s))   GLUCOSE, POC    Collection Time: 02/02/21 11:30 AM   Result Value Ref Range    Glucose (POC) 107 (H) 65 - 100 mg/dL   GLUCOSE, POC    Collection Time: 02/02/21  3:36 PM   Result Value Ref Range    Glucose (POC) 97 65 - 100 mg/dL   GLUCOSE, POC    Collection Time: 02/02/21  9:10 PM   Result Value Ref Range    Glucose (POC) 124 (H) 65 - 100 mg/dL   MAGNESIUM    Collection Time: 02/03/21  3:49 AM   Result Value Ref Range    Magnesium 2.0 1.8 - 2.4 mg/dL   CBC WITH AUTOMATED DIFF    Collection Time: 02/03/21  3:49 AM   Result Value Ref Range    WBC 14.9 (H) 4.3 - 11.1 K/uL    RBC 5.53 4.23 - 5.6 M/uL    HGB 14.9 13.6 - 17.2 g/dL    HCT 45.3 41.1 - 50.3 %    MCV 81.9 79.6 - 97.8 FL    MCH 26.9 26.1 - 32.9 PG    MCHC 32.9 31.4 - 35.0 g/dL RDW 17.8 (H) 11.9 - 14.6 %    PLATELET 123 386 - 625 K/uL    MPV 10.2 9.4 - 12.3 FL    ABSOLUTE NRBC 0.00 0.0 - 0.2 K/uL    DF AUTOMATED      NEUTROPHILS 82 (H) 43 - 78 %    LYMPHOCYTES 9 (L) 13 - 44 %    MONOCYTES 6 4.0 - 12.0 %    EOSINOPHILS 1 0.5 - 7.8 %    BASOPHILS 0 0.0 - 2.0 %    IMMATURE GRANULOCYTES 3 0.0 - 5.0 %    ABS. NEUTROPHILS 12.2 (H) 1.7 - 8.2 K/UL    ABS. LYMPHOCYTES 1.3 0.5 - 4.6 K/UL    ABS. MONOCYTES 0.9 0.1 - 1.3 K/UL    ABS. EOSINOPHILS 0.1 0.0 - 0.8 K/UL    ABS. BASOPHILS 0.0 0.0 - 0.2 K/UL    ABS. IMM. GRANS. 0.4 0.0 - 0.5 K/UL   METABOLIC PANEL, COMPREHENSIVE    Collection Time: 02/03/21  3:49 AM   Result Value Ref Range    Sodium 142 138 - 145 mmol/L    Potassium 4.2 3.5 - 5.1 mmol/L    Chloride 110 (H) 98 - 107 mmol/L    CO2 27 21 - 32 mmol/L    Anion gap 5 (L) 7 - 16 mmol/L    Glucose 76 65 - 100 mg/dL    BUN 35 (H) 8 - 23 MG/DL    Creatinine 0.88 0.8 - 1.5 MG/DL    GFR est AA >60 >60 ml/min/1.73m2    GFR est non-AA >60 >60 ml/min/1.73m2    Calcium 8.0 (L) 8.3 - 10.4 MG/DL    Bilirubin, total 0.5 0.2 - 1.1 MG/DL    ALT (SGPT) 92 (H) 12 - 65 U/L    AST (SGOT) 52 (H) 15 - 37 U/L    Alk.  phosphatase 81 50 - 136 U/L    Protein, total 4.6 (L) 6.3 - 8.2 g/dL    Albumin 1.9 (L) 3.2 - 4.6 g/dL    Globulin 2.7 2.3 - 3.5 g/dL    A-G Ratio 0.7 (L) 1.2 - 3.5     GLUCOSE, POC    Collection Time: 02/03/21  5:30 AM   Result Value Ref Range    Glucose (POC) 62 (L) 65 - 100 mg/dL       Discharge Exam:  Patient Vitals for the past 24 hrs:   Temp Pulse Resp BP SpO2   02/03/21 0826  81  101/68    02/03/21 0742 97.7 °F (36.5 °C) 81 18 92/67 92 %   02/03/21 0410 98 °F (36.7 °C) 78 20 98/64 91 %   02/02/21 2359 98.2 °F (36.8 °C) 80 19 96/65 90 %   02/02/21 2028 98.3 °F (36.8 °C) 83 20 98/68 93 %   02/02/21 1930     95 %   02/02/21 1548 98.3 °F (36.8 °C) 80 20 103/71 95 %   02/02/21 1100 97.5 °F (36.4 °C) 80 20 94/66 91 %     Oxygen Therapy  O2 Sat (%): 92 % (02/03/21 0742)  Pulse via Oximetry: 86 beats per minute (02/02/21 1930)  O2 Device: Nasal cannula (02/02/21 1930)  O2 Flow Rate (L/min): 4 l/min(Weaned to 4L) (02/02/21 1930)    Estimated body mass index is 30.02 kg/m² as calculated from the following:    Height as of 1/29/21: 5' 9\" (1.753 m). Weight as of this encounter: 92.2 kg (203 lb 4.2 oz). Intake/Output Summary (Last 24 hours) at 2/3/2021 0951  Last data filed at 2/3/2021 2558  Gross per 24 hour   Intake 480 ml   Output 1200 ml   Net -720 ml       *Note that automatically entered I/Os may not be accurate; dependent on patient compliance with collection and accurate  by assistants. General:    Well nourished. Alert. Eyes:   Normal sclerae. Extraocular movements intact. ENT:  Normocephalic, atraumatic. Moist mucous membranes  CV:   Regular rate and rhythm. No murmur, rub, or gallop. Lungs:  Clear to auscultation bilaterally. No wheezing, rhonchi, or rales. Abdomen: Soft, nontender, nondistended. Extremities: Warm and dry. No cyanosis or edema. Neurologic: CN II-XII grossly intact. No gross focal deficits   Skin:     No rashes or jaundice. Psych:  Normal mood and affect.     Current Med List in Hospital:   Current Facility-Administered Medications   Medication Dose Route Frequency    insulin glargine (LANTUS) injection 18 Units  18 Units SubCUTAneous QHS    albuterol (PROVENTIL HFA, VENTOLIN HFA, PROAIR HFA) inhaler 2 Puff  2 Puff Inhalation Q6H PRN    amiodarone (CORDARONE) tablet 200 mg  200 mg Oral DAILY    apixaban (ELIQUIS) tablet 5 mg  5 mg Oral BID    loratadine (CLARITIN) tablet 10 mg  10 mg Oral DAILY    [Held by provider] magnesium oxide (MAG-OX) tablet 400 mg  400 mg Oral BID    pantoprazole (PROTONIX) tablet 40 mg  40 mg Oral DAILY    pravastatin (PRAVACHOL) tablet 40 mg  40 mg Oral QHS    [Held by provider] sacubitriL-valsartan (ENTRESTO) 24-26 mg tablet 1 Tab  1 Tab Oral BID    [Held by provider] torsemide (DEMADEX) tablet 50 mg  50 mg Oral DAILY    potassium chloride (K-DUR, KLOR-CON) SR tablet 20 mEq  20 mEq Oral DAILY    metoprolol succinate (TOPROL-XL) XL tablet 50 mg  50 mg Oral DAILY    insulin lispro (HUMALOG) injection   SubCUTAneous AC&HS    midodrine (PROAMATINE) tablet 10 mg  10 mg Oral TID WITH MEALS    empagliflozin (JARDIANCE) tablet 10 mg (Patient Supplied)  10 mg Oral DAILY    eplerenone (INSPRA) tablet 25 mg (Patient Supplied)  25 mg Oral DAILY       Discharge Info:   Current Discharge Medication List      START taking these medications    Details   midodrine (PROAMATINE) 10 mg tablet Take 1 Tab by mouth three (3) times daily (with meals) for 30 days. Qty: 90 Tab, Refills: 0         CONTINUE these medications which have CHANGED    Details   insulin glargine (LANTUS) 100 unit/mL injection 18 Units by SubCUTAneous route nightly. Qty: 1 Vial, Refills: 1      metoprolol succinate (TOPROL-XL) 100 mg tablet Take 0.5 Tabs by mouth daily for 30 days. Qty: 15 Tab, Refills: 0         CONTINUE these medications which have NOT CHANGED    Details   albuterol (PROVENTIL HFA, VENTOLIN HFA, PROAIR HFA) 90 mcg/actuation inhaler Take 2 Puffs by inhalation every six (6) hours as needed for Wheezing or Shortness of Breath. Qty: 1 Inhaler, Refills: 0      empagliflozin (Jardiance) 10 mg tablet Take 10 mg by mouth daily. metFORMIN ER (glucophage XR) 500 mg tablet Take 1,000 mg by mouth daily (with dinner). potassium chloride SR (K-TAB) 20 mEq tablet Take 20 mEq by mouth daily. ferrous sulfate (IRON) 325 mg (65 mg iron) EC tablet Take 325 mg by mouth three (3) times daily (with meals). pantoprazole (Protonix) 40 mg tablet Take 40 mg by mouth daily. ondansetron (Zofran ODT) 4 mg disintegrating tablet Take 1 Tab by mouth every eight (8) hours as needed for Nausea. Qty: 10 Tab, Refills: 0      apixaban (ELIQUIS) 5 mg tablet Take 5 mg by mouth two (2) times a day.       cetirizine (ZYRTEC) 10 mg tablet Take 10 mg by mouth daily.      SITagliptin (JANUVIA) 100 mg tablet Take 100 mg by mouth daily. nystatin (NYSTOP) powder Apply  to affected area four (4) times daily. pravastatin (PRAVACHOL) 40 mg tablet Take 40 mg by mouth nightly. amiodarone (CORDARONE) 200 mg tablet Take 200 mg by mouth daily. STOP taking these medications       torsemide (DEMADEX) 100 mg tablet Comments:   Reason for Stopping:         magnesium oxide (MAG-OX) 400 mg tablet Comments:   Reason for Stopping:         sacubitril-valsartan (ENTRESTO) 24 mg/26 mg tablet Comments:   Reason for Stopping:         eplerenone (INSPRA) 25 mg tablet Comments:   Reason for Stopping:                 Time spent in patient discharge planning and coordination 35 minutes.     Signed:  Rufina Tompkins MD

## 2021-02-03 NOTE — PROGRESS NOTES
Pt resting in bed. Pt alert oriented times 3 at this time. Pt on 5  l NC. Pt denies pain or distress at this time. Pt encouraged to call for assistance if needed call light in reach, will monitor.

## 2021-02-03 NOTE — PROGRESS NOTES
Report given to Guy Colilns at [address] [place] [address] rehab. Pt awaiting EMS transport to facility.

## 2021-02-06 LAB
BACTERIA SPEC CULT: NORMAL
BACTERIA SPEC CULT: NORMAL
SERVICE CMNT-IMP: NORMAL
SERVICE CMNT-IMP: NORMAL

## 2021-02-22 ENCOUNTER — PATIENT OUTREACH (OUTPATIENT)
Dept: CASE MANAGEMENT | Age: 71
End: 2021-02-22

## 2021-02-22 NOTE — PROGRESS NOTES
Community Care Team documentation for patient in [facility]    The information below provided by:GPA    PT Update: SBA with transfers and ambulate with RW with CGA, and ADL's        Nursing Update:      Discharge Date:TBD-Patient is a 72 y.o. male with COVID DX, PMH of diabetes, CHF, AFIB and on Eliquis for CVA prophylaxis. He was previously admitted to ED 1/21/21 o 2/01/21. Plans to discharge home & F/u with PCP: Leonides Simmonds, MD.  Spouse will remain in LTC as Medicaid pending.      Assign to [facility] Manager:ANNA

## 2021-06-23 ENCOUNTER — HOSPITAL ENCOUNTER (EMERGENCY)
Age: 71
Discharge: HOME | End: 2021-06-24
Attending: EMERGENCY MEDICINE
Payer: MEDICARE

## 2021-06-23 DIAGNOSIS — R11.2 NAUSEA VOMITING AND DIARRHEA: Primary | ICD-10-CM

## 2021-06-23 DIAGNOSIS — R19.7 NAUSEA VOMITING AND DIARRHEA: Primary | ICD-10-CM

## 2021-06-23 LAB
ALBUMIN SERPL-MCNC: 3.1 G/DL (ref 3.2–4.6)
ALBUMIN/GLOB SERPL: 0.7 {RATIO} (ref 1.2–3.5)
ALP SERPL-CCNC: 74 U/L (ref 50–136)
ALT SERPL-CCNC: 15 U/L (ref 12–65)
ANION GAP SERPL CALC-SCNC: 8 MMOL/L (ref 7–16)
AST SERPL-CCNC: 14 U/L (ref 15–37)
BACTERIA SPEC CULT: NORMAL
BASOPHILS # BLD: 0.1 K/UL (ref 0–0.2)
BASOPHILS NFR BLD: 1 % (ref 0–2)
BILIRUB SERPL-MCNC: 0.6 MG/DL (ref 0.2–1.1)
BUN SERPL-MCNC: 10 MG/DL (ref 8–23)
CALCIUM SERPL-MCNC: 8.2 MG/DL (ref 8.3–10.4)
CHLORIDE SERPL-SCNC: 110 MMOL/L (ref 98–107)
CO2 SERPL-SCNC: 21 MMOL/L (ref 21–32)
CREAT SERPL-MCNC: 1.31 MG/DL (ref 0.8–1.5)
DIFFERENTIAL METHOD BLD: ABNORMAL
EOSINOPHIL # BLD: 0.3 K/UL (ref 0–0.8)
EOSINOPHIL NFR BLD: 2 % (ref 0.5–7.8)
ERYTHROCYTE [DISTWIDTH] IN BLOOD BY AUTOMATED COUNT: 14.9 % (ref 11.9–14.6)
GLOBULIN SER CALC-MCNC: 4.3 G/DL (ref 2.3–3.5)
GLUCOSE SERPL-MCNC: 133 MG/DL (ref 65–100)
HCT VFR BLD AUTO: 46.9 % (ref 41.1–50.3)
HGB BLD-MCNC: 15.7 G/DL (ref 13.6–17.2)
IMM GRANULOCYTES # BLD AUTO: 0.1 K/UL (ref 0–0.5)
IMM GRANULOCYTES NFR BLD AUTO: 1 % (ref 0–5)
LACTATE SERPL-SCNC: 1.9 MMOL/L (ref 0.4–2)
LYMPHOCYTES # BLD: 1 K/UL (ref 0.5–4.6)
LYMPHOCYTES NFR BLD: 7 % (ref 13–44)
MCH RBC QN AUTO: 26.7 PG (ref 26.1–32.9)
MCHC RBC AUTO-ENTMCNC: 33.5 G/DL (ref 31.4–35)
MCV RBC AUTO: 79.6 FL (ref 79.6–97.8)
MONOCYTES # BLD: 1.2 K/UL (ref 0.1–1.3)
MONOCYTES NFR BLD: 8 % (ref 4–12)
NEUTS SEG # BLD: 11.3 K/UL (ref 1.7–8.2)
NEUTS SEG NFR BLD: 81 % (ref 43–78)
NRBC # BLD: 0 K/UL (ref 0–0.2)
PLATELET # BLD AUTO: 225 K/UL (ref 150–450)
PMV BLD AUTO: 8.7 FL (ref 9.4–12.3)
POTASSIUM SERPL-SCNC: 3.3 MMOL/L (ref 3.5–5.1)
PROT SERPL-MCNC: 7.4 G/DL (ref 6.3–8.2)
RBC # BLD AUTO: 5.89 M/UL (ref 4.23–5.6)
SERVICE CMNT-IMP: NORMAL
SODIUM SERPL-SCNC: 139 MMOL/L (ref 136–145)
WBC # BLD AUTO: 14 K/UL (ref 4.3–11.1)

## 2021-06-23 PROCEDURE — 74011250636 HC RX REV CODE- 250/636: Performed by: EMERGENCY MEDICINE

## 2021-06-23 PROCEDURE — 87324 CLOSTRIDIUM AG IA: CPT

## 2021-06-23 PROCEDURE — 99281 EMR DPT VST MAYX REQ PHY/QHP: CPT

## 2021-06-23 PROCEDURE — 96374 THER/PROPH/DIAG INJ IV PUSH: CPT

## 2021-06-23 PROCEDURE — 99284 EMERGENCY DEPT VISIT MOD MDM: CPT

## 2021-06-23 PROCEDURE — 80053 COMPREHEN METABOLIC PANEL: CPT

## 2021-06-23 PROCEDURE — 87493 C DIFF AMPLIFIED PROBE: CPT

## 2021-06-23 PROCEDURE — 74011250637 HC RX REV CODE- 250/637: Performed by: EMERGENCY MEDICINE

## 2021-06-23 PROCEDURE — 83605 ASSAY OF LACTIC ACID: CPT

## 2021-06-23 PROCEDURE — 85025 COMPLETE CBC W/AUTO DIFF WBC: CPT

## 2021-06-23 RX ORDER — ONDANSETRON 2 MG/ML
4 INJECTION INTRAMUSCULAR; INTRAVENOUS
Status: COMPLETED | OUTPATIENT
Start: 2021-06-23 | End: 2021-06-23

## 2021-06-23 RX ORDER — LOPERAMIDE HYDROCHLORIDE 2 MG/1
2 CAPSULE ORAL
Status: COMPLETED | OUTPATIENT
Start: 2021-06-23 | End: 2021-06-23

## 2021-06-23 RX ADMIN — SODIUM CHLORIDE, SODIUM LACTATE, POTASSIUM CHLORIDE, AND CALCIUM CHLORIDE 500 ML: 600; 310; 30; 20 INJECTION, SOLUTION INTRAVENOUS at 20:57

## 2021-06-23 RX ADMIN — LOPERAMIDE HYDROCHLORIDE 2 MG: 2 CAPSULE ORAL at 20:56

## 2021-06-23 RX ADMIN — ONDANSETRON 4 MG: 2 INJECTION INTRAMUSCULAR; INTRAVENOUS at 20:56

## 2021-06-23 NOTE — ED TRIAGE NOTES
Pt arrived via EMS from home c/o n/v/d since Saturday. Pt reports he had COVID in January.  /72 HR 76 Temp 98.8  EMS gave 150cc NS

## 2021-06-23 NOTE — ED PROVIDER NOTES
This past weekend on Saturday he developed nausea vomiting and diarrhea. Over time the nausea and vomiting seemed to resolve but he has persistence of his diarrhea. He is taken no medications for this other than staying on clear liquids. He lives alone at present his wife is in rehab or nursing facility after they both had Covid. He has had no blood in his stool. Has fairly extensive cardiac and procedure based histories. He does not give a history though anytime recently being on antibiotics and has never had C. difficile. The history is provided by the patient. Diarrhea   This is a new problem. Episode onset: 5 days. The pain is associated with an unknown factor. Associated symptoms include diarrhea.         Past Medical History:   Diagnosis Date    Arthritis     OA-general/neck    Chronic pain     back    Diabetes (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    S/P total knee replacement using cement 7/20/2011    right knee    Unspecified adverse effect of anesthesia     reports unable to do spinal with right knee due to collapsed discs/ no problem with GA       Past Surgical History:   Procedure Laterality Date    COLONOSCOPY N/A 2/17/2020    COLONOSCOPY performed by Hilton Ortega MD at [facility] ENDOSCOPY    HX ORTHOPAEDIC  July 20,2011    replaced right TKA/ L TKA in Dec 2011         Family History:   Problem Relation Age of Onset    Diabetes Mother     Heart Disease Father     Diabetes Sister     Diabetes Maternal Aunt        Social History     Socioeconomic History    Marital status:      Spouse name: Not on file    Number of children: Not on file    Years of education: Not on file    Highest education level: Not on file   Occupational History    Not on file   Tobacco Use    Smoking status: Never Smoker    Smokeless tobacco: Never Used   Substance and Sexual Activity    Alcohol use: No    Drug use: Not on file    Sexual activity: Not on file   Other Topics Concern    Not on file   Social History Narrative    Not on file     Social Determinants of Health     Financial Resource Strain:     Difficulty of Paying Living Expenses:    Food Insecurity:     Worried About Running Out of Food in the Last Year:     [address] in the Last Year:    Transportation Needs:     Lack of Transportation (Medical):  Lack of Transportation (Non-Medical):    Physical Activity:     Days of Exercise per Week:     Minutes of Exercise per Session:    Stress:     Feeling of Stress :    Social Connections:     Frequency of Communication with Friends and Family:     Frequency of Social Gatherings with Friends and Family:     Attends Faith Services:     Active Member of Clubs or Organizations:     Attends Club or Organization Meetings:     Marital Status:    Intimate Partner Violence:     Fear of Current or Ex-Partner:     Emotionally Abused:     Physically Abused:     Sexually Abused: ALLERGIES: Adhesive, Other medication, Pcn [penicillins], and Pollen extracts    Review of Systems   Gastrointestinal: Positive for diarrhea. All other systems reviewed and are negative. Vitals:    06/23/21 1825   BP: 126/76   Pulse: 82   Resp: 16   Temp: 98.3 °F (36.8 °C)   SpO2: 96%   Weight: 99.8 kg (220 lb)   Height: 5' 10\" (1.778 m)            Physical Exam  Vitals and nursing note reviewed. Constitutional:       General: He is not in acute distress. Appearance: He is well-developed. Comments: Very pleasant and actually looks quite stable and good   HENT:      Head: Atraumatic. Eyes:      General: No scleral icterus. Cardiovascular:      Rate and Rhythm: Normal rate.    Pulmonary:      Effort: Pulmonary effort is normal. No respiratory distress. Abdominal:      General: Bowel sounds are normal.      Palpations: Abdomen is soft. Tenderness: There is no abdominal tenderness. There is no right CVA tenderness, left CVA tenderness or guarding. Musculoskeletal:      Cervical back: Neck supple. Right lower leg: No edema. Left lower leg: No edema. Skin:     General: Skin is warm and dry. Neurological:      Mental Status: Mental status is at baseline. Psychiatric:         Thought Content: Thought content normal.          MDM  Number of Diagnoses or Management Options  Nausea vomiting and diarrhea  Diagnosis management comments: Patient with nausea and vomiting essentially resolved diarrhea states is been significant though he has none in our department. He was able to give us a stool specimen. We will have him treat his diarrhea symptomatically at present.   Stool specimen was negative for C. difficile       Amount and/or Complexity of Data Reviewed  Clinical lab tests: ordered and reviewed  Decide to obtain previous medical records or to obtain history from someone other than the patient: yes    Risk of Complications, Morbidity, and/or Mortality  Presenting problems: moderate  Diagnostic procedures: moderate  Management options: moderate    Patient Progress  Patient progress: stable         Procedures

## 2021-06-24 VITALS
TEMPERATURE: 98.3 F | HEIGHT: 70 IN | HEART RATE: 76 BPM | DIASTOLIC BLOOD PRESSURE: 72 MMHG | WEIGHT: 220 LBS | RESPIRATION RATE: 16 BRPM | OXYGEN SATURATION: 95 % | BODY MASS INDEX: 31.5 KG/M2 | SYSTOLIC BLOOD PRESSURE: 127 MMHG

## 2021-06-24 LAB
C DIFF GDH STL QL: NORMAL
C DIFF TOX A+B STL QL IA: NORMAL
CLINICAL CONSIDERATION: NORMAL
INTERPRETATION: NORMAL
PCR REFLEX: NORMAL

## 2021-06-24 RX ORDER — ONDANSETRON 4 MG/1
4 TABLET, ORALLY DISINTEGRATING ORAL
Qty: 10 TABLET | Refills: 0 | Status: SHIPPED | OUTPATIENT
Start: 2021-06-24

## 2021-06-24 NOTE — ED NOTES
I have reviewed discharge instructions with the patient. The patient verbalized understanding. Patient left ED via Discharge Method: ambulatory to Home with self. Opportunity for questions and clarification provided. Patient given 0 scripts. To continue your aftercare when you leave the hospital, you may receive an automated call from our care team to check in on how you are doing. This is a free service and part of our promise to provide the best care and service to meet your aftercare needs.  If you have questions, or wish to unsubscribe from this service please call [phone]. Thank you for Choosing our [facility] Emergency Department.

## 2021-06-24 NOTE — DISCHARGE INSTRUCTIONS
Nausea, as needed: Zofran  Plenty of fluids avoid dairy  Diarrhea is negative for rapid tests for C. difficile  May take Imodium for your diarrhea at home   Please contact your primary care provider for ongoing work-up with persistence of symptoms  Nausea and vomiting, only if needed: Zofran

## 2021-11-26 ENCOUNTER — HOSPITAL ENCOUNTER (EMERGENCY)
Age: 71
Discharge: HOME | End: 2021-11-26
Attending: EMERGENCY MEDICINE
Payer: MEDICARE

## 2021-11-26 ENCOUNTER — APPOINTMENT (OUTPATIENT)
Dept: CT IMAGING | Age: 71
End: 2021-11-26
Attending: STUDENT IN AN ORGANIZED HEALTH CARE EDUCATION/TRAINING PROGRAM
Payer: MEDICARE

## 2021-11-26 VITALS
SYSTOLIC BLOOD PRESSURE: 130 MMHG | RESPIRATION RATE: 16 BRPM | TEMPERATURE: 98.6 F | DIASTOLIC BLOOD PRESSURE: 72 MMHG | OXYGEN SATURATION: 100 % | HEART RATE: 82 BPM

## 2021-11-26 DIAGNOSIS — S00.81XA ABRASION OF FACE, INITIAL ENCOUNTER: Primary | ICD-10-CM

## 2021-11-26 DIAGNOSIS — S00.83XA CONTUSION OF FACE, INITIAL ENCOUNTER: ICD-10-CM

## 2021-11-26 DIAGNOSIS — Z91.89 AT RISK FOR INJURY ASSOCIATED WITH ANTICOAGULATION: ICD-10-CM

## 2021-11-26 PROCEDURE — 72125 CT NECK SPINE W/O DYE: CPT

## 2021-11-26 PROCEDURE — 99284 EMERGENCY DEPT VISIT MOD MDM: CPT

## 2021-11-26 PROCEDURE — 90471 IMMUNIZATION ADMIN: CPT

## 2021-11-26 PROCEDURE — 70450 CT HEAD/BRAIN W/O DYE: CPT

## 2021-11-26 PROCEDURE — 90715 TDAP VACCINE 7 YRS/> IM: CPT | Performed by: EMERGENCY MEDICINE

## 2021-11-26 PROCEDURE — 74011250636 HC RX REV CODE- 250/636: Performed by: EMERGENCY MEDICINE

## 2021-11-26 RX ADMIN — TETANUS TOXOID, REDUCED DIPHTHERIA TOXOID AND ACELLULAR PERTUSSIS VACCINE, ADSORBED 0.5 ML: 5; 2.5; 8; 8; 2.5 SUSPENSION INTRAMUSCULAR at 12:04

## 2021-11-26 NOTE — ED NOTES
Left forehead and hand cleaned with NS. Non stick bandage applied to forehead with neosporin and left hand bandage placed.

## 2021-11-26 NOTE — ED TRIAGE NOTES
Patient arrives to ED via EMS from home. Patient slipped and fell on his driveway. Patient hit his head. Denies LOC. Patient is on Eliquis. Patient has laceration to left eyebrow.

## 2021-11-26 NOTE — ED NOTES
I have reviewed discharge instructions with the patient. The patient verbalized understanding. Patient left ED via Discharge Method: wheelchair to Home with friend. Opportunity for questions and clarification provided. Patient given 0 scripts. To continue your aftercare when you leave the hospital, you may receive an automated call from our care team to check in on how you are doing. This is a free service and part of our promise to provide the best care and service to meet your aftercare needs.  If you have questions, or wish to unsubscribe from this service please call [phone]. Thank you for Choosing our [facility] Emergency Department.

## 2021-12-10 NOTE — PROGRESS NOTES
In and out cath completed  Patient tolerated well  300 mL of urine collected Patient admitted with shortness of breath and Covid positive.  Patient day 2 of Remdesivir.  Patient currently on 3 liters of oxygen.      Barriers--Patient will need oxygen weaned and home oxygen eval completed prior to discharge.  Patient can discharge with [facility] at home for oxygen if needed.      Liz Garner RN  Inpatient

## 2022-03-18 PROBLEM — A41.9 SEVERE SEPSIS (HCC): Status: ACTIVE | Noted: 2021-01-23

## 2022-03-18 PROBLEM — R65.20 SEVERE SEPSIS (HCC): Status: ACTIVE | Noted: 2021-01-23

## 2022-03-18 PROBLEM — D72.828 NEUTROPHILIC LEUKOCYTOSIS: Status: ACTIVE | Noted: 2021-02-02

## 2022-03-19 PROBLEM — J12.82 PNEUMONIA DUE TO COVID-19 VIRUS: Status: ACTIVE | Noted: 2021-01-21

## 2022-03-19 PROBLEM — U07.1 PNEUMONIA DUE TO COVID-19 VIRUS: Status: ACTIVE | Noted: 2021-01-21

## 2022-03-19 PROBLEM — N49.2 CELLULITIS OF SCROTUM: Status: ACTIVE | Noted: 2019-08-12

## 2022-03-19 PROBLEM — R60.1 ANASARCA: Status: ACTIVE | Noted: 2019-08-13

## 2022-03-19 PROBLEM — N28.9 ACUTE RENAL INSUFFICIENCY: Status: ACTIVE | Noted: 2021-02-02

## 2022-03-19 PROBLEM — R09.02 HYPOXIA: Status: ACTIVE | Noted: 2021-02-02

## 2022-03-19 PROBLEM — N17.9 AKI (ACUTE KIDNEY INJURY) (HCC): Status: ACTIVE | Noted: 2021-02-02

## 2022-03-19 PROBLEM — I95.9 HYPOTENSION: Status: ACTIVE | Noted: 2021-02-02

## 2022-03-19 PROBLEM — I50.23 ACUTE ON CHRONIC SYSTOLIC CHF (CONGESTIVE HEART FAILURE) (HCC): Status: ACTIVE | Noted: 2019-08-12

## 2022-03-20 PROBLEM — E87.20 LACTIC ACIDOSIS: Status: ACTIVE | Noted: 2021-02-02

## 2022-03-20 PROBLEM — I27.81 COR PULMONALE (CHRONIC) (HCC): Status: ACTIVE | Noted: 2019-08-13

## 2022-03-20 PROBLEM — I42.9 CARDIOMYOPATHY (HCC): Status: ACTIVE | Noted: 2019-08-13

## 2022-03-20 PROBLEM — I48.0 PAROXYSMAL ATRIAL FIBRILLATION (HCC): Status: ACTIVE | Noted: 2019-08-13
